# Patient Record
Sex: FEMALE | Race: WHITE | NOT HISPANIC OR LATINO | Employment: FULL TIME | URBAN - METROPOLITAN AREA
[De-identification: names, ages, dates, MRNs, and addresses within clinical notes are randomized per-mention and may not be internally consistent; named-entity substitution may affect disease eponyms.]

---

## 2023-09-27 PROBLEM — G44.86 CERVICOGENIC HEADACHE: Status: ACTIVE | Noted: 2023-09-27

## 2023-09-27 PROBLEM — E04.2 MULTIPLE THYROID NODULES: Status: ACTIVE | Noted: 2023-09-27

## 2023-09-27 PROBLEM — N93.0 POSTCOITAL BLEEDING: Status: ACTIVE | Noted: 2023-09-27

## 2023-09-27 PROBLEM — M54.12 CERVICAL RADICULOPATHY: Status: ACTIVE | Noted: 2023-09-27

## 2023-09-27 PROBLEM — M25.519 SHOULDER PAIN: Status: ACTIVE | Noted: 2023-09-27

## 2023-09-27 RX ORDER — PROPRANOLOL HYDROCHLORIDE 20 MG/1
20 TABLET ORAL DAILY
COMMUNITY
Start: 2021-06-28

## 2023-09-27 RX ORDER — SPIRONOLACTONE 50 MG/1
50 TABLET, FILM COATED ORAL 2 TIMES DAILY
COMMUNITY

## 2023-09-27 RX ORDER — FLUTICASONE PROPIONATE 50 MCG
2 SPRAY, SUSPENSION (ML) NASAL DAILY PRN
COMMUNITY
Start: 2016-12-19

## 2023-09-27 RX ORDER — MELOXICAM 15 MG/1
15 TABLET ORAL DAILY
COMMUNITY
End: 2024-01-08 | Stop reason: ALTCHOICE

## 2023-09-27 RX ORDER — LISDEXAMFETAMINE DIMESYLATE 50 MG/1
CAPSULE ORAL
COMMUNITY
Start: 2022-01-26 | End: 2024-01-08 | Stop reason: DRUGHIGH

## 2023-09-27 RX ORDER — DEXTROAMPHETAMINE SACCHARATE, AMPHETAMINE ASPARTATE, DEXTROAMPHETAMINE SULFATE AND AMPHETAMINE SULFATE 1.25; 1.25; 1.25; 1.25 MG/1; MG/1; MG/1; MG/1
TABLET ORAL
COMMUNITY
Start: 2016-12-01 | End: 2024-01-08 | Stop reason: ALTCHOICE

## 2023-09-27 RX ORDER — BUSPIRONE HYDROCHLORIDE 10 MG/1
10 TABLET ORAL 3 TIMES DAILY
COMMUNITY
Start: 2021-06-07 | End: 2024-06-03

## 2023-10-02 ENCOUNTER — OFFICE VISIT (OUTPATIENT)
Dept: OBSTETRICS AND GYNECOLOGY | Facility: CLINIC | Age: 38
End: 2023-10-02
Payer: COMMERCIAL

## 2023-10-02 VITALS
WEIGHT: 145 LBS | BODY MASS INDEX: 25.69 KG/M2 | DIASTOLIC BLOOD PRESSURE: 80 MMHG | SYSTOLIC BLOOD PRESSURE: 120 MMHG | HEIGHT: 63 IN

## 2023-10-02 DIAGNOSIS — Z01.419 ENCOUNTER FOR ANNUAL ROUTINE GYNECOLOGICAL EXAMINATION: ICD-10-CM

## 2023-10-02 PROCEDURE — 99202 OFFICE O/P NEW SF 15 MIN: CPT | Performed by: OBSTETRICS & GYNECOLOGY

## 2023-10-02 ASSESSMENT — ENCOUNTER SYMPTOMS
EYES NEGATIVE: 0
ALLERGIC/IMMUNOLOGIC NEGATIVE: 0
GASTROINTESTINAL NEGATIVE: 0
NEUROLOGICAL NEGATIVE: 0
RESPIRATORY NEGATIVE: 0
CARDIOVASCULAR NEGATIVE: 0
HEMATOLOGIC/LYMPHATIC NEGATIVE: 0
MUSCULOSKELETAL NEGATIVE: 0
PSYCHIATRIC NEGATIVE: 0
ENDOCRINE NEGATIVE: 0
CONSTITUTIONAL NEGATIVE: 0

## 2023-10-02 ASSESSMENT — PAIN SCALES - GENERAL: PAINLEVEL: 6

## 2023-10-02 NOTE — PROGRESS NOTES
Subjective   Patient ID: Jen Franks is a 38 y.o. female who presents for Annual Exam and Abdominal Pain.  HPI    Review of Systems    Objective   Physical Exam    Assessment/Plan   {Assess/PlanSmartLinks:51617}

## 2023-10-02 NOTE — PROGRESS NOTES
Pt here for  severe Menstrual pain during her cycles. And bleeding during intercourse.    Pt  had a normal ultrasound recently and is interested in an IUD     Chaperone declined Yvette Ge

## 2023-10-16 ENCOUNTER — OFFICE VISIT (OUTPATIENT)
Dept: ORTHOPEDIC SURGERY | Facility: HOSPITAL | Age: 38
End: 2023-10-16
Payer: COMMERCIAL

## 2023-10-16 DIAGNOSIS — M25.511 CHRONIC RIGHT SHOULDER PAIN: Primary | ICD-10-CM

## 2023-10-16 DIAGNOSIS — M25.511 RIGHT SHOULDER PAIN, UNSPECIFIED CHRONICITY: ICD-10-CM

## 2023-10-16 DIAGNOSIS — G89.29 CHRONIC RIGHT SHOULDER PAIN: Primary | ICD-10-CM

## 2023-10-16 PROCEDURE — 99212 OFFICE O/P EST SF 10 MIN: CPT | Performed by: ORTHOPAEDIC SURGERY

## 2023-10-16 NOTE — PROGRESS NOTES
Is here for follow-up on her right shoulder she continues to have pain despite injection and extensive physical therapy.    Right shoulder range of motion elevation 170 external rotation 70 internal rotation T10.  Motor power abduction internal rotation 5/5 there is pain at the extremes of range of motion particularly internal rotation and abduction.    Right shoulder pain    Patient has recalcitrant right shoulder pain I have recommended further evaluation by MRI.  Follow-up after MRI.    This was dictated using voice recognition software and not corrected for grammatical or spelling errors.

## 2023-10-25 ENCOUNTER — APPOINTMENT (OUTPATIENT)
Dept: RADIOLOGY | Facility: CLINIC | Age: 38
End: 2023-10-25
Payer: COMMERCIAL

## 2023-10-27 ENCOUNTER — APPOINTMENT (OUTPATIENT)
Dept: ORTHOPEDIC SURGERY | Facility: HOSPITAL | Age: 38
End: 2023-10-27
Payer: COMMERCIAL

## 2023-10-30 ENCOUNTER — OFFICE VISIT (OUTPATIENT)
Dept: ORTHOPEDIC SURGERY | Facility: HOSPITAL | Age: 38
End: 2023-10-30
Payer: COMMERCIAL

## 2023-10-30 DIAGNOSIS — M25.511 RIGHT SHOULDER PAIN, UNSPECIFIED CHRONICITY: ICD-10-CM

## 2023-10-30 PROCEDURE — 99213 OFFICE O/P EST LOW 20 MIN: CPT | Performed by: ORTHOPAEDIC SURGERY

## 2023-10-30 PROCEDURE — 2500000005 HC RX 250 GENERAL PHARMACY W/O HCPCS: Performed by: ORTHOPAEDIC SURGERY

## 2023-10-30 PROCEDURE — 2500000004 HC RX 250 GENERAL PHARMACY W/ HCPCS (ALT 636 FOR OP/ED): Performed by: ORTHOPAEDIC SURGERY

## 2023-10-30 PROCEDURE — 20610 DRAIN/INJ JOINT/BURSA W/O US: CPT | Performed by: ORTHOPAEDIC SURGERY

## 2023-10-30 RX ORDER — TRIAMCINOLONE ACETONIDE 40 MG/ML
40 INJECTION, SUSPENSION INTRA-ARTICULAR; INTRAMUSCULAR
Status: COMPLETED | OUTPATIENT
Start: 2023-10-30 | End: 2023-10-30

## 2023-10-30 RX ORDER — LIDOCAINE HYDROCHLORIDE 10 MG/ML
4 INJECTION INFILTRATION; PERINEURAL
Status: COMPLETED | OUTPATIENT
Start: 2023-10-30 | End: 2023-10-30

## 2023-10-30 RX ADMIN — LIDOCAINE HYDROCHLORIDE 4 ML: 10 INJECTION, SOLUTION INFILTRATION; PERINEURAL at 15:09

## 2023-10-30 RX ADMIN — TRIAMCINOLONE ACETONIDE 40 MG: 200 INJECTION, SUSPENSION INTRA-ARTICULAR; INTRAMUSCULAR at 15:09

## 2023-10-30 NOTE — PROGRESS NOTES
Patient is here for reevaluation of her right shoulder and review of her MRI scan.  She continues to have pain in the shoulder primarily posteriorly.    Physical examination right shoulder reveals full range of motion and slight pain on passive internal rotation and cross body motion.      MRI scan reviewed in detail with the patient, it is a poor quality MRI.  Cannot distinguish the superior rotator cuff tendon or the labrum clearly enough to make a diagnosis.  The anterior and posterior rotator cuff.  Intact biceps tendon is in the bicipital groove.    Right shoulder pain    Patient continues to have right shoulder pain differential diagnosis includes posterior labrum tear and rotator cuff tear.  MRI scan is nondiagnostic.  Recommended repeat injection intra-articular and physical therapy consultation and repeat examination in 3 to 4 weeks.  If still symptomatic recommend a repeat MRI scan with arthrogram.    This was dictated using voice recognition software and not corrected for grammatical or spelling errors.        phyL Inj/Asp: R glenohumeral on 10/30/2023 3:09 PM  Indications: pain  Details: 22 G needle, posterior approach  Medications: 40 mg triamcinolone acetonide 40 mg/mL; 4 mL lidocaine 10 mg/mL (1 %)  Procedure, treatment alternatives, risks and benefits explained, specific risks discussed. Consent was given by the patient.

## 2023-11-01 ENCOUNTER — APPOINTMENT (OUTPATIENT)
Dept: ORTHOPEDIC SURGERY | Facility: HOSPITAL | Age: 38
End: 2023-11-01
Payer: COMMERCIAL

## 2023-11-14 ENCOUNTER — PROCEDURE VISIT (OUTPATIENT)
Dept: OBSTETRICS AND GYNECOLOGY | Facility: CLINIC | Age: 38
End: 2023-11-14
Payer: COMMERCIAL

## 2023-11-14 VITALS
SYSTOLIC BLOOD PRESSURE: 104 MMHG | HEIGHT: 62 IN | WEIGHT: 135 LBS | DIASTOLIC BLOOD PRESSURE: 70 MMHG | BODY MASS INDEX: 24.84 KG/M2

## 2023-11-14 DIAGNOSIS — Z30.430 ENCOUNTER FOR IUD INSERTION: ICD-10-CM

## 2023-11-14 PROCEDURE — 58300 INSERT INTRAUTERINE DEVICE: CPT | Performed by: OBSTETRICS & GYNECOLOGY

## 2023-11-14 PROCEDURE — 99214 OFFICE O/P EST MOD 30 MIN: CPT | Performed by: OBSTETRICS & GYNECOLOGY

## 2023-11-14 ASSESSMENT — PAIN SCALES - GENERAL: PAINLEVEL: 0-NO PAIN

## 2023-11-14 NOTE — PROGRESS NOTES
I saw and evaluated the patient. I personally obtained the key and critical portions of the history and physical exam or was physically present for key and critical portions performed by the resident/fellow. I reviewed the resident/fellow's documentation and discussed the patient with the resident/fellow. I agree with the resident/fellow's medical decision making as documented in the note.    Bridgette Cesar MD

## 2023-11-14 NOTE — PROGRESS NOTES
"Subjective   Patient ID: Jen Franks is a 38 y.o. female who presents for IUD INSERTION.    HPI     First time IUD   Heavy menstrual bleeding  Previously OCP and nexplanon and continued to have heavy bleeding      Review of Systems   Genitourinary:  Positive for menstrual problem and vaginal bleeding.       Objective   /70 (BP Location: Right arm, Patient Position: Sitting, BP Cuff Size: Adult)   Ht 1.575 m (5' 2\")   Wt 61.2 kg (135 lb)   LMP 11/01/2023 (Exact Date)   BMI 24.69 kg/m²     Physical Exam  Constitutional:       Appearance: She is obese.   Pulmonary:      Effort: Pulmonary effort is normal.   Genitourinary:     General: Normal vulva.      Vagina: Normal.      Cervix: Normal.      Uterus: Normal.       Adnexa: Right adnexa normal and left adnexa normal.      Rectum: Normal.   Musculoskeletal:      Cervical back: Neck supple.   Skin:     General: Skin is warm.   Psychiatric:         Attention and Perception: Attention normal.         Mood and Affect: Mood normal.         Speech: Speech normal.         Behavior: Behavior normal.         Thought Content: Thought content normal.           IUD Insertion    Date/Time: 11/14/2023 3:15 PM    Performed by: Bridgette Cesar MD  Authorized by: Bridgette Cesar MD    Consent:     Consent obtained:  Verbal and written    Consent given by:  Patient    Procedure risks and benefits discussed: yes      Patient questions answered: yes      Patient agrees, verbalizes understanding, and wants to proceed: yes      Educational handouts given: yes    Procedure:     Pelvic exam performed: yes      Cervix cleaned and prepped: yes      Speculum placed in vagina: yes      Tenaculum applied to cervix: yes      Uterus sounded: yes      Uterus sound depth (cm):  8    IUD inserted with no complications: yes      IUD type:  Mirena    Strings trimmed: yes    Post-procedure:     Patient tolerated procedure well: yes      Patient will follow up after next period: yes    Comments:    "   Lidocaine injection given in cervix     Assessment/Plan   Diagnoses and all orders for this visit:  Encounter for IUD insertion  -     IUD Insertion  Ibuprofen 600mg given post procedure  Patient tolerated well    Follow up in 6-8 weeks for check up    I have personally reviewed all available pertinent labs, imaging, and consult notes with the patient.     All questions and concerns were addressed. Patient verbalizes understanding instructions and agrees with established plan of care.     Patient seen and discussed with Dr. Arsenio Camp MD

## 2023-11-15 ENCOUNTER — OFFICE VISIT (OUTPATIENT)
Dept: ORTHOPEDIC SURGERY | Facility: HOSPITAL | Age: 38
End: 2023-11-15
Payer: COMMERCIAL

## 2023-11-15 DIAGNOSIS — G89.29 CHRONIC RIGHT SHOULDER PAIN: ICD-10-CM

## 2023-11-15 DIAGNOSIS — M25.511 CHRONIC RIGHT SHOULDER PAIN: ICD-10-CM

## 2023-11-15 DIAGNOSIS — M25.511 RIGHT SHOULDER PAIN, UNSPECIFIED CHRONICITY: ICD-10-CM

## 2023-11-15 PROCEDURE — 99212 OFFICE O/P EST SF 10 MIN: CPT | Performed by: ORTHOPAEDIC SURGERY

## 2023-11-15 PROCEDURE — 1036F TOBACCO NON-USER: CPT | Performed by: ORTHOPAEDIC SURGERY

## 2023-11-15 NOTE — PROGRESS NOTES
Symptoms of pain primarily at extremes of range of motion when laying on her right side.  She did not get full relief with injection or oral steroids she has had therapy and has not like a progress    Right shoulder elevation 170 external rotation 60 internal rotation T12.  Motor power abduction 4 external rotation 4 internal rotation 5.  Positive Oliver's test pain at the extremes of range of motion particularly elevation.    Right shoulder pain    The patient has right shoulder pain refractory to treatment I have recommended further evaluation by MRI arthrogram she had a previous MRI scan that was nondiagnostic.    We will attempt to obtain approval for an MRI arthrogram and she will follow-up after it has been done.    This was dictated using voice recognition software and not corrected for grammatical or spelling errors.

## 2023-11-29 ENCOUNTER — APPOINTMENT (OUTPATIENT)
Dept: ORTHOPEDIC SURGERY | Facility: HOSPITAL | Age: 38
End: 2023-11-29
Payer: COMMERCIAL

## 2023-12-14 ENCOUNTER — HOSPITAL ENCOUNTER (OUTPATIENT)
Dept: RADIOLOGY | Facility: HOSPITAL | Age: 38
Discharge: HOME | End: 2023-12-14
Payer: COMMERCIAL

## 2023-12-14 VITALS
RESPIRATION RATE: 18 BRPM | HEART RATE: 102 BPM | SYSTOLIC BLOOD PRESSURE: 104 MMHG | OXYGEN SATURATION: 100 % | DIASTOLIC BLOOD PRESSURE: 73 MMHG

## 2023-12-14 DIAGNOSIS — G89.29 CHRONIC RIGHT SHOULDER PAIN: ICD-10-CM

## 2023-12-14 DIAGNOSIS — M25.511 RIGHT SHOULDER PAIN, UNSPECIFIED CHRONICITY: ICD-10-CM

## 2023-12-14 DIAGNOSIS — M25.511 CHRONIC RIGHT SHOULDER PAIN: ICD-10-CM

## 2023-12-14 PROCEDURE — 73222 MRI JOINT UPR EXTREM W/DYE: CPT | Mod: RIGHT SIDE | Performed by: STUDENT IN AN ORGANIZED HEALTH CARE EDUCATION/TRAINING PROGRAM

## 2023-12-14 PROCEDURE — 23350 INJECTION FOR SHOULDER X-RAY: CPT | Mod: RT

## 2023-12-14 PROCEDURE — 96373 THER/PROPH/DIAG INJ IA: CPT | Performed by: ORTHOPAEDIC SURGERY

## 2023-12-14 PROCEDURE — 23350 INJECTION FOR SHOULDER X-RAY: CPT | Mod: RIGHT SIDE | Performed by: STUDENT IN AN ORGANIZED HEALTH CARE EDUCATION/TRAINING PROGRAM

## 2023-12-14 PROCEDURE — 73222 MRI JOINT UPR EXTREM W/DYE: CPT | Mod: RT

## 2023-12-14 PROCEDURE — 77002 NEEDLE LOCALIZATION BY XRAY: CPT | Mod: RIGHT SIDE | Performed by: STUDENT IN AN ORGANIZED HEALTH CARE EDUCATION/TRAINING PROGRAM

## 2023-12-14 PROCEDURE — 2550000001 HC RX 255 CONTRASTS: Performed by: ORTHOPAEDIC SURGERY

## 2023-12-14 PROCEDURE — A9575 INJ GADOTERATE MEGLUMI 0.1ML: HCPCS | Performed by: ORTHOPAEDIC SURGERY

## 2023-12-14 RX ORDER — LIDOCAINE HYDROCHLORIDE 20 MG/ML
4 INJECTION, SOLUTION INFILTRATION; PERINEURAL ONCE
Status: DISCONTINUED | OUTPATIENT
Start: 2023-12-14 | End: 2023-12-15 | Stop reason: HOSPADM

## 2023-12-14 RX ORDER — GADOTERATE MEGLUMINE 376.9 MG/ML
0.15 INJECTION INTRAVENOUS
Status: COMPLETED | OUTPATIENT
Start: 2023-12-14 | End: 2023-12-14

## 2023-12-14 RX ADMIN — IOHEXOL 10 ML: 180 INJECTION INTRAVENOUS at 11:49

## 2023-12-14 RX ADMIN — GADOTERATE MEGLUMINE 0.15 ML: 376.9 INJECTION INTRAVENOUS at 11:49

## 2023-12-14 ASSESSMENT — PAIN SCALES - GENERAL: PAINLEVEL_OUTOF10: 0 - NO PAIN

## 2023-12-18 ENCOUNTER — OFFICE VISIT (OUTPATIENT)
Dept: ORTHOPEDIC SURGERY | Facility: HOSPITAL | Age: 38
End: 2023-12-18
Payer: COMMERCIAL

## 2023-12-18 DIAGNOSIS — S43.431D SUPERIOR GLENOID LABRUM LESION OF RIGHT SHOULDER, SUBSEQUENT ENCOUNTER: Primary | ICD-10-CM

## 2023-12-18 DIAGNOSIS — S43.431D SUPERIOR GLENOID LABRUM LESION OF RIGHT SHOULDER, SUBSEQUENT ENCOUNTER: ICD-10-CM

## 2023-12-18 PROBLEM — S43.431A SUPERIOR GLENOID LABRUM LESION OF RIGHT SHOULDER: Status: ACTIVE | Noted: 2023-12-18

## 2023-12-18 PROCEDURE — L3670 SO ACRO/CLAV CAN WEB PRE OTS: HCPCS | Performed by: ORTHOPAEDIC SURGERY

## 2023-12-18 PROCEDURE — 99213 OFFICE O/P EST LOW 20 MIN: CPT | Performed by: ORTHOPAEDIC SURGERY

## 2023-12-18 PROCEDURE — 1036F TOBACCO NON-USER: CPT | Performed by: ORTHOPAEDIC SURGERY

## 2023-12-18 NOTE — PROGRESS NOTES
Patient continues to have symptoms in her right shoulder she is here to review the MRI arthrogram    MRI arthrogram of the right shoulder reviewed in detail with the patient shows a SLAP lesion superior aspect of the labrum near the bicipital attachment.  The glenohumeral articular cartilage and rotator cuff are intact.     SLAP lesion right shoulder    We discussed options for treatment I recommend the patient consider surgery explained the surgery in detail and the rehabilitation sequence in detail patient is interested in pursuing the procedure procedure be right shoulder arthroscopy with repair of SLAP lesion.  Will schedule for sometime in the near future.    This was dictated using voice recognition software and not corrected for grammatical or spelling errors.

## 2023-12-22 ENCOUNTER — TELEPHONE (OUTPATIENT)
Dept: OBSTETRICS AND GYNECOLOGY | Facility: CLINIC | Age: 38
End: 2023-12-22
Payer: COMMERCIAL

## 2023-12-22 NOTE — TELEPHONE ENCOUNTER
Pt had IUD inserted in November and is having extremely painful cramps (with intermittent spotting) that started shortly after insertion that comes and goes wants to be seen. Pt has appt on Jan 9th 24.

## 2023-12-23 DIAGNOSIS — R10.2 PELVIC PAIN: Primary | ICD-10-CM

## 2023-12-26 NOTE — TELEPHONE ENCOUNTER
Spoke with pt and verified by name and   Pt already has U/S radha. Will follow up  after test is done

## 2023-12-28 ENCOUNTER — ANCILLARY PROCEDURE (OUTPATIENT)
Dept: RADIOLOGY | Facility: CLINIC | Age: 38
End: 2023-12-28
Payer: COMMERCIAL

## 2023-12-28 DIAGNOSIS — R10.2 PELVIC PAIN: ICD-10-CM

## 2023-12-28 PROCEDURE — 76856 US EXAM PELVIC COMPLETE: CPT | Performed by: RADIOLOGY

## 2023-12-28 PROCEDURE — 76830 TRANSVAGINAL US NON-OB: CPT | Performed by: RADIOLOGY

## 2023-12-28 PROCEDURE — 76856 US EXAM PELVIC COMPLETE: CPT

## 2024-01-04 ENCOUNTER — APPOINTMENT (OUTPATIENT)
Dept: RADIOLOGY | Facility: HOSPITAL | Age: 39
End: 2024-01-04
Payer: COMMERCIAL

## 2024-01-08 ENCOUNTER — PRE-ADMISSION TESTING (OUTPATIENT)
Dept: PREADMISSION TESTING | Facility: HOSPITAL | Age: 39
End: 2024-01-08
Payer: COMMERCIAL

## 2024-01-08 ENCOUNTER — LAB (OUTPATIENT)
Dept: LAB | Facility: LAB | Age: 39
End: 2024-01-08
Payer: COMMERCIAL

## 2024-01-08 VITALS
WEIGHT: 149.47 LBS | HEART RATE: 103 BPM | DIASTOLIC BLOOD PRESSURE: 82 MMHG | HEIGHT: 62 IN | TEMPERATURE: 96.8 F | BODY MASS INDEX: 27.51 KG/M2 | SYSTOLIC BLOOD PRESSURE: 117 MMHG | OXYGEN SATURATION: 100 %

## 2024-01-08 DIAGNOSIS — Z01.818 PREOPERATIVE TESTING: ICD-10-CM

## 2024-01-08 DIAGNOSIS — Z01.818 PREOPERATIVE TESTING: Primary | ICD-10-CM

## 2024-01-08 LAB
ANION GAP SERPL CALC-SCNC: 9 MMOL/L
BUN SERPL-MCNC: 14 MG/DL (ref 8–25)
CALCIUM SERPL-MCNC: 9.2 MG/DL (ref 8.5–10.4)
CHLORIDE SERPL-SCNC: 102 MMOL/L (ref 97–107)
CO2 SERPL-SCNC: 27 MMOL/L (ref 24–31)
CREAT SERPL-MCNC: 0.8 MG/DL (ref 0.4–1.6)
EGFRCR SERPLBLD CKD-EPI 2021: >90 ML/MIN/1.73M*2
ERYTHROCYTE [DISTWIDTH] IN BLOOD BY AUTOMATED COUNT: 11.5 % (ref 11.5–14.5)
GLUCOSE SERPL-MCNC: 95 MG/DL (ref 65–99)
HCT VFR BLD AUTO: 41.8 % (ref 36–46)
HGB BLD-MCNC: 14.4 G/DL (ref 12–16)
MCH RBC QN AUTO: 32.4 PG (ref 26–34)
MCHC RBC AUTO-ENTMCNC: 34.4 G/DL (ref 32–36)
MCV RBC AUTO: 94 FL (ref 80–100)
NRBC BLD-RTO: 0 /100 WBCS (ref 0–0)
PLATELET # BLD AUTO: 277 X10*3/UL (ref 150–450)
POTASSIUM SERPL-SCNC: 4.8 MMOL/L (ref 3.4–5.1)
RBC # BLD AUTO: 4.45 X10*6/UL (ref 4–5.2)
SODIUM SERPL-SCNC: 138 MMOL/L (ref 133–145)
WBC # BLD AUTO: 8.5 X10*3/UL (ref 4.4–11.3)

## 2024-01-08 PROCEDURE — 85027 COMPLETE CBC AUTOMATED: CPT

## 2024-01-08 PROCEDURE — 80048 BASIC METABOLIC PNL TOTAL CA: CPT

## 2024-01-08 PROCEDURE — 94760 N-INVAS EAR/PLS OXIMETRY 1: CPT

## 2024-01-08 PROCEDURE — 36415 COLL VENOUS BLD VENIPUNCTURE: CPT

## 2024-01-08 PROCEDURE — 99203 OFFICE O/P NEW LOW 30 MIN: CPT | Performed by: NURSE PRACTITIONER

## 2024-01-08 RX ORDER — CALCIUM CARBONATE 300MG(750)
400 TABLET,CHEWABLE ORAL DAILY
COMMUNITY

## 2024-01-08 RX ORDER — DIPHENHYDRAMINE HCL 25 MG
25 TABLET ORAL 2 TIMES DAILY PRN
COMMUNITY
End: 2024-06-03

## 2024-01-08 RX ORDER — LISDEXAMFETAMINE DIMESYLATE 70 MG/1
70 CAPSULE ORAL EVERY MORNING
COMMUNITY
End: 2024-06-03

## 2024-01-08 RX ORDER — LANOLIN ALCOHOL/MO/W.PET/CERES
1000 CREAM (GRAM) TOPICAL DAILY
COMMUNITY
End: 2024-06-03

## 2024-01-08 RX ORDER — CETIRIZINE HYDROCHLORIDE 10 MG/1
10 TABLET ORAL DAILY
COMMUNITY

## 2024-01-08 ASSESSMENT — DUKE ACTIVITY SCORE INDEX (DASI)
CAN YOU WALK INDOORS, SUCH AS AROUND YOUR HOUSE: YES
CAN YOU PARTICIPATE IN STRENOUS SPORTS LIKE SWIMMING, SINGLES TENNIS, FOOTBALL, BASKETBALL, OR SKIING: YES
CAN YOU PARTICIPATE IN MODERATE RECREATIONAL ACTIVITIES LIKE GOLF, BOWLING, DANCING, DOUBLES TENNIS OR THROWING A BASEBALL OR FOOTBALL: YES
CAN YOU DO YARD WORK LIKE RAKING LEAVES, WEEDING OR PUSHING A MOWER: YES
CAN YOU TAKE CARE OF YOURSELF (EAT, DRESS, BATHE, OR USE TOILET): YES
DASI METS SCORE: 9.9
TOTAL_SCORE: 58.2
CAN YOU DO MODERATE WORK AROUND THE HOUSE LIKE VACUUMING, SWEEPING FLOORS OR CARRYING GROCERIES: YES
CAN YOU DO LIGHT WORK AROUND THE HOUSE LIKE DUSTING OR WASHING DISHES: YES
CAN YOU HAVE SEXUAL RELATIONS: YES
CAN YOU CLIMB A FLIGHT OF STAIRS OR WALK UP A HILL: YES
CAN YOU RUN A SHORT DISTANCE: YES
CAN YOU DO HEAVY WORK AROUND THE HOUSE LIKE SCRUBBING FLOORS OR LIFTING AND MOVING HEAVY FURNITURE: YES
CAN YOU WALK A BLOCK OR TWO ON LEVEL GROUND: YES

## 2024-01-08 ASSESSMENT — CHADS2 SCORE
CHF: NO
PRIOR STROKE OR TIA OR THROMBOEMBOLISM: NO
AGE GREATER THAN OR EQUAL TO 75: NO
HYPERTENSION: NO
DIABETES: NO
CHADS2 SCORE: 0

## 2024-01-08 ASSESSMENT — ENCOUNTER SYMPTOMS
GASTROINTESTINAL NEGATIVE: 1
PSYCHIATRIC NEGATIVE: 1
NEUROLOGICAL NEGATIVE: 1
CARDIOVASCULAR NEGATIVE: 1
ARTHRALGIAS: 1
ACTIVITY CHANGE: 1
EYES NEGATIVE: 1
RESPIRATORY NEGATIVE: 1

## 2024-01-08 ASSESSMENT — PAIN SCALES - GENERAL: PAINLEVEL_OUTOF10: 4

## 2024-01-08 ASSESSMENT — PAIN DESCRIPTION - DESCRIPTORS: DESCRIPTORS: ACHING

## 2024-01-08 ASSESSMENT — PAIN - FUNCTIONAL ASSESSMENT: PAIN_FUNCTIONAL_ASSESSMENT: 0-10

## 2024-01-08 NOTE — PREPROCEDURE INSTRUCTIONS
Medication List            Accurate as of January 8, 2024  7:50 AM. Always use your most recent med list.                busPIRone 10 mg tablet  Commonly known as: Buspar  Medication Adjustments for Surgery: Take morning of surgery with sip of water, no other fluids     CALTRATE WITH VITAMIN D3 ORAL  Medication Adjustments for Surgery: Stop 7 days before surgery     cetirizine 10 mg tablet  Commonly known as: ZyrTEC  Notes to patient: DO NOT TAKE MORNING OF SURGERY     cyanocobalamin 1,000 mcg tablet  Commonly known as: Vitamin B-12  Medication Adjustments for Surgery: Stop 7 days before surgery     diphenhydrAMINE 25 mg tablet  Commonly known as: Sominex  Notes to patient: DO NOT TAKE MORNING OF SURGERY     fluticasone 50 mcg/actuation nasal spray  Commonly known as: Flonase     GLUCOSAMINE CHONDROITIN ORAL  Medication Adjustments for Surgery: Stop 7 days before surgery     lisdexamfetamine 70 mg capsule  Commonly known as: Vyvanse  Notes to patient: DO NOT TAKE MORNING OF SURGERY     magnesium oxide 400 mg tablet  Commonly known as: Mag-Ox  Medication Adjustments for Surgery: Stop 7 days before surgery     propranolol 20 mg tablet  Commonly known as: Inderal  Medication Adjustments for Surgery: Take morning of surgery with sip of water, no other fluids     spironolactone 50 mg tablet  Commonly known as: Aldactone  Medication Adjustments for Surgery: Take morning of surgery with sip of water, no other fluids     VALERIAN ROOT ORAL  Medication Adjustments for Surgery: Stop 7 days before surgery     Vraylar 1.5 mg capsule  Generic drug: cariprazine  Medication Adjustments for Surgery: Take morning of surgery with sip of water, no other fluids                              NPO Instructions:    Do not eat any food after midnight the night before your surgery/procedure.    Additional Instructions:     Day of Surgery:  Review your medication instructions, take indicated medications  Wear  comfortable loose fitting  clothing  All jewelry and valuables should be left at home    PAT DISCHARGE INSTRUCTIONS    Please call the Same Day Surgery (SDS) Department of the hospital where your procedure will be performed after 2:00 PM the day before your surgery. If you are scheduled on a Monday, or a Tuesday following a Monday holiday, you will need to call on the last business day prior to your surgery.    [facility]  [address] [place] [address], [address] [phone]    [address] [address] [phone]    [facility]  [address]  [place] [id number] [phone]    Please let your surgeon know if:      You develop any open sores, shingles, burning or painful urination as these may increase your risk of an infection.   You no longer wish to have the surgery.   Any other personal circumstances change that may lead to the need to cancel or defer this surgery-such as being sick or getting admitted to any hospital within one week of your planned procedure.    Your contact details change, such as a change of address or phone number.    Starting now:     Please DO NOT drink alcohol or smoke for 24 hours before surgery. It is well known that quitting smoking can make a huge difference to your health and recovery from surgery. The longer you abstain from smoking, the better your chances of a healthy recovery. If you need help with quitting, call 1-800-QUIT-NOW to be connected to a trained counselor who will discuss the best methods to help you quit.     Before your surgery:    Please stop all supplements 7 days prior to surgery. Or as directed by your surgeon.   Please stop taking NSAID pain medicine such as Advil and Motrin 7 days before surgery.    If you develop any fever, cough, cold, rashes, cuts, scratches, scrapes, urinary symptoms or infection anywhere on your body (including teeth and gums)  prior to surgery, please call your surgeon’s office as soon as possible. This may require treatment to reduce the chance of cancellation on the day of surgery.    The day before your surgery:   DIET- Do not eat any food after MIDNIGHT. May have 10 ounces of CLEAR LIQUIDS until TWO HOURS before your arrival time. This includes water, black tea or coffee (no milk ir cream), apple juice and electrolyte drinks (Gatorade). May chew gum until TWO hours before your surgery time.   Get a good night’s rest.  Use the special soap for bathing if you have been instructed to use one.    Scheduled surgery times may change and you will be notified if this occurs - please check your personal voicemail for any updates.     On the morning of surgery:   Wear comfortable, loose fitting clothes which open in the front. Please do not wear moisturizers, creams, lotions, makeup or perfume.    Please bring with you to surgery:   Photo ID and insurance card   Current list of medicines and allergies   Pacemaker/ Defibrillator/Heart stent cards   CPAP machine and mask    Slings/ splints/ crutches   A copy of your complete advanced directive/DHPOA.    Please do NOT bring with you to surgery:   All jewelry and valuables should be left at home.   Prosthetic devices such as contact lenses, hearing aids, dentures, eyelash extensions, hairpins and body piercings must be removed prior to going in to the surgical suite.    After outpatient surgery:   A responsible adult MUST accompany you at the time of discharge and stay with you for 24 hours after your surgery. You may NOT drive yourself home after surgery.    Do not drive, operate machinery, make critical decisions or do activities that require co-ordination or balance until after a night’s sleep.   Do not drink alcoholic beverages for 24 hours.   Instructions for resuming your medications will be provided by your surgeon.    CALL YOUR DOCTOR AFTER SURGERY IF YOU HAVE:     Chills and/or a fever of  101° F or higher.    Redness, swelling, pus or drainage from your surgical wound or a bad smell from the wound.    Lightheadedness, fainting or confusion.    Persistent vomiting (throwing up) and are not able to eat or drink for 12 hours.    Three or more loose, watery bowel movements in 24 hours (diarrhea).   Difficulty or pain while urinating( after non-urological surgery)    Pain and swelling in your legs, especially if it is only on one side.    Difficulty breathing or are breathing faster than normal.    Any new concerning symptoms.

## 2024-01-08 NOTE — CPM/PAT H&P
CPM/PAT Evaluation       Name: Jen Franks (Jen Franks)  /Age: 1985/38 y.o.     In-Person       Chief Complaint: Superior glenoid labrum lesion of right shoulder    HPI  A 38-year-old female with superior glenoid labrum lesion of right shoulder.  History of progressive right shoulder pain over the past 8 months.  Symptoms increase with reaching, lifting, or pulling motions interfering with sleep and exercise.  Conservative treatments/injections not helping.  Denies fever, chills, or right upper extremity numbness/tingling. She is scheduled for right shoulder SLAP repair.    Past Medical History:   Diagnosis Date    ADHD (attention deficit hyperactivity disorder)     Adverse effect of anesthesia     Postop agitation    Anxiety     Other conditions influencing health status 2017    History of frequent headaches    Other specified disorders of temporomandibular joint 2017    Temporomandibular jaw dysfunction       Past Surgical History:   Procedure Laterality Date    KNEE Bilateral     Arthroscopy    OTHER SURGICAL HISTORY  2022    Knee surgery         Allergies   Allergen Reactions    Eggs-Apples-Oats [Nutritional Supplement-Fiber] GI Upset    Meperidine Other     PANIC ATTACK       Current Outpatient Medications   Medication Sig Dispense Refill    busPIRone (Buspar) 10 mg tablet Take 1 tablet (10 mg) by mouth 3 times a day.      calcium carbonate/vitamin D3 (CALTRATE WITH VITAMIN D3 ORAL) Take 1 tablet by mouth once daily. CALTRATE BONE HEALTH 600 PLUS 20 MCG VIT D 3      cariprazine (Vraylar) 1.5 mg capsule Take 1 capsule (1.5 mg) by mouth once daily. MORNING      cetirizine (ZyrTEC) 10 mg tablet Take 1 tablet (10 mg) by mouth once daily. MORNING      cyanocobalamin (Vitamin B-12) 1,000 mcg tablet Take 1 tablet (1,000 mcg) by mouth once daily.      diphenhydrAMINE (Sominex) 25 mg tablet Take 1 tablet (25 mg) by mouth 2 times a day as needed for sleep, allergies or itching. 25 MG - 50 MG       fluticasone (Flonase) 50 mcg/actuation nasal spray 2 sprays once daily as needed for rhinitis or allergies.      glucos sul 2KCl/msm/chond/C/Mn (GLUCOSAMINE CHONDROITIN ORAL) Take 1 tablet by mouth once daily. 4050  MG      lisdexamfetamine (Vyvanse) 70 mg capsule Take 1 capsule (70 mg) by mouth once daily in the morning.      magnesium oxide (Mag-Ox) 400 mg tablet Take 1 tablet (400 mg) by mouth once daily.      propranolol (Inderal) 20 mg tablet Take 1 tablet (20 mg) by mouth once daily. EVERY MORNING FOR TREATMENT OF ANXIETY      spironolactone (Aldactone) 50 mg tablet Take 1 tablet (50 mg) by mouth 2 times a day. ACNE      VALERIAN ROOT ORAL Take 2,400 mg by mouth once daily.       No current facility-administered medications for this visit.       Review of Systems   Constitutional:  Positive for activity change.   HENT: Negative.     Eyes: Negative.         Glasses   Respiratory: Negative.     Cardiovascular: Negative.    Gastrointestinal: Negative.    Genitourinary: Negative.    Musculoskeletal:  Positive for arthralgias (Progressive right shoulder pain decreased range of motion).   Neurological: Negative.    Psychiatric/Behavioral: Negative.          Physical Exam  Vitals reviewed.   Constitutional:       Appearance: Normal appearance.   HENT:      Head: Normocephalic and atraumatic.      Mouth/Throat:      Mouth: Mucous membranes are moist.   Eyes:      Pupils: Pupils are equal, round, and reactive to light.      Comments: Glasses   Cardiovascular:      Rate and Rhythm: Normal rate and regular rhythm.   Pulmonary:      Effort: Pulmonary effort is normal.   Abdominal:      Palpations: Abdomen is soft.   Musculoskeletal:         General: Normal range of motion.      Cervical back: Normal range of motion.      Comments: Right shoulder decreased range of motion   Skin:     General: Skin is warm and dry.   Neurological:      Mental Status: She is alert and oriented to person, place, and time.   Psychiatric:     "     Mood and Affect: Mood normal.          PAT AIRWAY:   Airway:     Mallampati::  I    Neck ROM::  Full  normal        /82   Pulse 103   Temp 36 °C (96.8 °F) (Temporal)   Ht 1.575 m (5' 2\")   Wt 67.8 kg (149 lb 7.6 oz)   SpO2 100%   BMI 27.34 kg/m²       Stop Bang Score 0     CHADS 2 score: 1.9%  DASI score: 58.2  METS score: 9.9  Revised cardiac risk index: 0.4%  ASA I  ARISCAT 1.6%      Assessment and Plan:     Superior glenoid labrum lesion of right shoulder Plan: Right shoulder SLAP repair  Headaches  TMJ  Depression  ADHD        "

## 2024-01-09 ENCOUNTER — ANESTHESIA EVENT (OUTPATIENT)
Dept: OPERATING ROOM | Facility: HOSPITAL | Age: 39
End: 2024-01-09
Payer: COMMERCIAL

## 2024-01-09 ENCOUNTER — APPOINTMENT (OUTPATIENT)
Dept: PREADMISSION TESTING | Facility: HOSPITAL | Age: 39
End: 2024-01-09
Payer: COMMERCIAL

## 2024-01-09 ENCOUNTER — OFFICE VISIT (OUTPATIENT)
Dept: OBSTETRICS AND GYNECOLOGY | Facility: CLINIC | Age: 39
End: 2024-01-09
Payer: COMMERCIAL

## 2024-01-09 VITALS — WEIGHT: 150 LBS | DIASTOLIC BLOOD PRESSURE: 64 MMHG | SYSTOLIC BLOOD PRESSURE: 108 MMHG | BODY MASS INDEX: 27.44 KG/M2

## 2024-01-09 DIAGNOSIS — N93.0 POSTCOITAL BLEEDING: Primary | ICD-10-CM

## 2024-01-09 DIAGNOSIS — Z30.431 CHECKING OF INTRAUTERINE DEVICE: ICD-10-CM

## 2024-01-09 PROCEDURE — 99213 OFFICE O/P EST LOW 20 MIN: CPT | Performed by: OBSTETRICS & GYNECOLOGY

## 2024-01-09 PROCEDURE — 87205 SMEAR GRAM STAIN: CPT

## 2024-01-09 PROCEDURE — 1036F TOBACCO NON-USER: CPT | Performed by: OBSTETRICS & GYNECOLOGY

## 2024-01-09 RX ORDER — DOXYCYCLINE 100 MG/1
100 CAPSULE ORAL 2 TIMES DAILY
Qty: 14 CAPSULE | Refills: 0 | Status: SHIPPED | OUTPATIENT
Start: 2024-01-09 | End: 2024-01-16

## 2024-01-09 ASSESSMENT — ENCOUNTER SYMPTOMS
CONSTITUTIONAL NEGATIVE: 0
PSYCHIATRIC NEGATIVE: 0
NEUROLOGICAL NEGATIVE: 0
MUSCULOSKELETAL NEGATIVE: 0
ALLERGIC/IMMUNOLOGIC NEGATIVE: 0
GASTROINTESTINAL NEGATIVE: 0
CARDIOVASCULAR NEGATIVE: 0
RESPIRATORY NEGATIVE: 0
ENDOCRINE NEGATIVE: 0
HEMATOLOGIC/LYMPHATIC NEGATIVE: 0
EYES NEGATIVE: 0

## 2024-01-09 ASSESSMENT — PAIN SCALES - GENERAL: PAINLEVEL: 0-NO PAIN

## 2024-01-09 NOTE — PROGRESS NOTES
Subjective   Patient ID: Jen Franks is a 38 y.o. female who presents for IUD CHECK (Pt says she having some bleeding During [place].).  HPI      Spotting with intercourse - gotten worse since IUD. Not clots  Cramping since IUD but has gotten better.   Merina IUD since November.   US pelvis 12/13 - WNL      Cycle irregular - 15 day periods at a child   Pap - 2/23/21 wnl HPV neg         Review of Systems  All pertinent positive symptoms are included in the history of present illness.  All other systems have been reviewed and are negative and noncontributory to this patient's current ailments.    Objective   Physical Exam  Constitutional:       Appearance: She is obese.   Pulmonary:      Effort: Pulmonary effort is normal.   Genitourinary:     General: Normal vulva.      Vagina: Normal.      Cervix: Normal.      Uterus: Normal.       Adnexa: Right adnexa normal and left adnexa normal.      Rectum: Normal.      Comments: IUD strings visualized  Musculoskeletal:      Cervical back: Neck supple.   Skin:     General: Skin is warm.   Psychiatric:         Attention and Perception: Attention normal.         Mood and Affect: Mood normal.         Speech: Speech normal.         Behavior: Behavior normal.         Thought Content: Thought content normal.         Assessment/Plan     Impression:. IUD in place. Post coital bleeding (r/o low grade endometritis)  Diagnostic Plan:. BV swab  Treatment Plan:. Doxycycline x7 days      Sheridan Goel MD 01/09/24 11:15 AM

## 2024-01-10 LAB
CLUE CELLS VAG LPF-#/AREA: ABNORMAL /[LPF]
NUGENT SCORE: 1
YEAST VAG WET PREP-#/AREA: PRESENT

## 2024-01-11 ENCOUNTER — ANESTHESIA (OUTPATIENT)
Dept: OPERATING ROOM | Facility: HOSPITAL | Age: 39
End: 2024-01-11
Payer: COMMERCIAL

## 2024-01-11 ENCOUNTER — HOSPITAL ENCOUNTER (OUTPATIENT)
Facility: HOSPITAL | Age: 39
Setting detail: OUTPATIENT SURGERY
Discharge: HOME | End: 2024-01-11
Attending: ORTHOPAEDIC SURGERY | Admitting: ORTHOPAEDIC SURGERY
Payer: COMMERCIAL

## 2024-01-11 VITALS
RESPIRATION RATE: 16 BRPM | SYSTOLIC BLOOD PRESSURE: 94 MMHG | WEIGHT: 144.62 LBS | TEMPERATURE: 97.2 F | BODY MASS INDEX: 25.62 KG/M2 | HEART RATE: 60 BPM | DIASTOLIC BLOOD PRESSURE: 66 MMHG | OXYGEN SATURATION: 100 % | HEIGHT: 63 IN

## 2024-01-11 DIAGNOSIS — S43.431D SUPERIOR GLENOID LABRUM LESION OF RIGHT SHOULDER, SUBSEQUENT ENCOUNTER: Primary | ICD-10-CM

## 2024-01-11 LAB — HCG UR QL IA.RAPID: NEGATIVE

## 2024-01-11 PROCEDURE — A4649 SURGICAL SUPPLIES: HCPCS | Performed by: ORTHOPAEDIC SURGERY

## 2024-01-11 PROCEDURE — 7100000009 HC PHASE TWO TIME - INITIAL BASE CHARGE: Performed by: ORTHOPAEDIC SURGERY

## 2024-01-11 PROCEDURE — 29807 SHO ARTHRS SRG RPR SLAP LES: CPT | Performed by: ORTHOPAEDIC SURGERY

## 2024-01-11 PROCEDURE — 81025 URINE PREGNANCY TEST: CPT | Performed by: ORTHOPAEDIC SURGERY

## 2024-01-11 PROCEDURE — 3600000004 HC OR TIME - INITIAL BASE CHARGE - PROCEDURE LEVEL FOUR: Performed by: ORTHOPAEDIC SURGERY

## 2024-01-11 PROCEDURE — 3600000009 HC OR TIME - EACH INCREMENTAL 1 MINUTE - PROCEDURE LEVEL FOUR: Performed by: ORTHOPAEDIC SURGERY

## 2024-01-11 PROCEDURE — 3700000001 HC GENERAL ANESTHESIA TIME - INITIAL BASE CHARGE: Performed by: ORTHOPAEDIC SURGERY

## 2024-01-11 PROCEDURE — 2500000004 HC RX 250 GENERAL PHARMACY W/ HCPCS (ALT 636 FOR OP/ED): Performed by: ANESTHESIOLOGY

## 2024-01-11 PROCEDURE — 3700000002 HC GENERAL ANESTHESIA TIME - EACH INCREMENTAL 1 MINUTE: Performed by: ORTHOPAEDIC SURGERY

## 2024-01-11 PROCEDURE — 2780000003 HC OR 278 NO HCPCS: Performed by: ORTHOPAEDIC SURGERY

## 2024-01-11 PROCEDURE — 94760 N-INVAS EAR/PLS OXIMETRY 1: CPT

## 2024-01-11 PROCEDURE — 2720000007 HC OR 272 NO HCPCS: Performed by: ORTHOPAEDIC SURGERY

## 2024-01-11 PROCEDURE — 7100000010 HC PHASE TWO TIME - EACH INCREMENTAL 1 MINUTE: Performed by: ORTHOPAEDIC SURGERY

## 2024-01-11 PROCEDURE — 2500000004 HC RX 250 GENERAL PHARMACY W/ HCPCS (ALT 636 FOR OP/ED): Performed by: ORTHOPAEDIC SURGERY

## 2024-01-11 PROCEDURE — 7100000002 HC RECOVERY ROOM TIME - EACH INCREMENTAL 1 MINUTE: Performed by: ORTHOPAEDIC SURGERY

## 2024-01-11 PROCEDURE — 2500000005 HC RX 250 GENERAL PHARMACY W/O HCPCS: Performed by: ANESTHESIOLOGY

## 2024-01-11 PROCEDURE — 7100000001 HC RECOVERY ROOM TIME - INITIAL BASE CHARGE: Performed by: ORTHOPAEDIC SURGERY

## 2024-01-11 PROCEDURE — 76942 ECHO GUIDE FOR BIOPSY: CPT | Performed by: ANESTHESIOLOGY

## 2024-01-11 DEVICE — IMPLANTABLE DEVICE: Type: IMPLANTABLE DEVICE | Site: SHOULDER | Status: FUNCTIONAL

## 2024-01-11 RX ORDER — ROCURONIUM BROMIDE 10 MG/ML
INJECTION, SOLUTION INTRAVENOUS AS NEEDED
Status: DISCONTINUED | OUTPATIENT
Start: 2024-01-11 | End: 2024-01-11

## 2024-01-11 RX ORDER — GLYCOPYRROLATE 0.2 MG/ML
INJECTION INTRAMUSCULAR; INTRAVENOUS AS NEEDED
Status: DISCONTINUED | OUTPATIENT
Start: 2024-01-11 | End: 2024-01-11

## 2024-01-11 RX ORDER — FENTANYL CITRATE 50 UG/ML
INJECTION, SOLUTION INTRAMUSCULAR; INTRAVENOUS AS NEEDED
Status: DISCONTINUED | OUTPATIENT
Start: 2024-01-11 | End: 2024-01-11

## 2024-01-11 RX ORDER — MIDAZOLAM HYDROCHLORIDE 1 MG/ML
2 INJECTION, SOLUTION INTRAMUSCULAR; INTRAVENOUS ONCE
Status: COMPLETED | OUTPATIENT
Start: 2024-01-11 | End: 2024-01-11

## 2024-01-11 RX ORDER — FENTANYL CITRATE 50 UG/ML
25 INJECTION, SOLUTION INTRAMUSCULAR; INTRAVENOUS EVERY 5 MIN PRN
Status: DISCONTINUED | OUTPATIENT
Start: 2024-01-11 | End: 2024-01-11 | Stop reason: HOSPADM

## 2024-01-11 RX ORDER — ALBUTEROL SULFATE 0.83 MG/ML
2.5 SOLUTION RESPIRATORY (INHALATION) ONCE AS NEEDED
Status: DISCONTINUED | OUTPATIENT
Start: 2024-01-11 | End: 2024-01-11 | Stop reason: HOSPADM

## 2024-01-11 RX ORDER — DEXAMETHASONE SODIUM PHOSPHATE 4 MG/ML
INJECTION, SOLUTION INTRA-ARTICULAR; INTRALESIONAL; INTRAMUSCULAR; INTRAVENOUS; SOFT TISSUE AS NEEDED
Status: DISCONTINUED | OUTPATIENT
Start: 2024-01-11 | End: 2024-01-11

## 2024-01-11 RX ORDER — HYDRALAZINE HYDROCHLORIDE 20 MG/ML
5 INJECTION INTRAMUSCULAR; INTRAVENOUS EVERY 30 MIN PRN
Status: DISCONTINUED | OUTPATIENT
Start: 2024-01-11 | End: 2024-01-11 | Stop reason: HOSPADM

## 2024-01-11 RX ORDER — FENTANYL CITRATE 50 UG/ML
50 INJECTION, SOLUTION INTRAMUSCULAR; INTRAVENOUS ONCE
Status: COMPLETED | OUTPATIENT
Start: 2024-01-11 | End: 2024-01-11

## 2024-01-11 RX ORDER — CEFAZOLIN SODIUM 2 G/100ML
INJECTION, SOLUTION INTRAVENOUS AS NEEDED
Status: DISCONTINUED | OUTPATIENT
Start: 2024-01-11 | End: 2024-01-11

## 2024-01-11 RX ORDER — KETOROLAC TROMETHAMINE 30 MG/ML
INJECTION, SOLUTION INTRAMUSCULAR; INTRAVENOUS AS NEEDED
Status: DISCONTINUED | OUTPATIENT
Start: 2024-01-11 | End: 2024-01-11

## 2024-01-11 RX ORDER — SODIUM CHLORIDE, SODIUM LACTATE, POTASSIUM CHLORIDE, CALCIUM CHLORIDE 600; 310; 30; 20 MG/100ML; MG/100ML; MG/100ML; MG/100ML
50 INJECTION, SOLUTION INTRAVENOUS CONTINUOUS
Status: DISCONTINUED | OUTPATIENT
Start: 2024-01-11 | End: 2024-01-11 | Stop reason: HOSPADM

## 2024-01-11 RX ORDER — ONDANSETRON HYDROCHLORIDE 2 MG/ML
4 INJECTION, SOLUTION INTRAVENOUS ONCE AS NEEDED
Status: DISCONTINUED | OUTPATIENT
Start: 2024-01-11 | End: 2024-01-11 | Stop reason: HOSPADM

## 2024-01-11 RX ORDER — DIPHENHYDRAMINE HYDROCHLORIDE 50 MG/ML
12.5 INJECTION INTRAMUSCULAR; INTRAVENOUS ONCE AS NEEDED
Status: DISCONTINUED | OUTPATIENT
Start: 2024-01-11 | End: 2024-01-11 | Stop reason: HOSPADM

## 2024-01-11 RX ORDER — NEOSTIGMINE METHYLSULFATE 5 MG/5 ML
SYRINGE (ML) INTRAVENOUS AS NEEDED
Status: DISCONTINUED | OUTPATIENT
Start: 2024-01-11 | End: 2024-01-11

## 2024-01-11 RX ORDER — PROPOFOL 10 MG/ML
INJECTION, EMULSION INTRAVENOUS AS NEEDED
Status: DISCONTINUED | OUTPATIENT
Start: 2024-01-11 | End: 2024-01-11

## 2024-01-11 RX ORDER — ONDANSETRON HYDROCHLORIDE 2 MG/ML
INJECTION, SOLUTION INTRAVENOUS AS NEEDED
Status: DISCONTINUED | OUTPATIENT
Start: 2024-01-11 | End: 2024-01-11

## 2024-01-11 RX ORDER — LIDOCAINE HYDROCHLORIDE 10 MG/ML
INJECTION, SOLUTION EPIDURAL; INFILTRATION; INTRACAUDAL; PERINEURAL AS NEEDED
Status: DISCONTINUED | OUTPATIENT
Start: 2024-01-11 | End: 2024-01-11

## 2024-01-11 RX ORDER — CEFAZOLIN 1 G/1
2 INJECTION, POWDER, FOR SOLUTION INTRAVENOUS ONCE
Status: DISCONTINUED | OUTPATIENT
Start: 2024-01-11 | End: 2024-01-11 | Stop reason: HOSPADM

## 2024-01-11 RX ORDER — IPRATROPIUM BROMIDE 0.5 MG/2.5ML
500 SOLUTION RESPIRATORY (INHALATION) ONCE
Status: DISCONTINUED | OUTPATIENT
Start: 2024-01-11 | End: 2024-01-11 | Stop reason: HOSPADM

## 2024-01-11 RX ORDER — OXYCODONE AND ACETAMINOPHEN 5; 325 MG/1; MG/1
1 TABLET ORAL EVERY 4 HOURS PRN
Qty: 42 TABLET | Refills: 0 | Status: SHIPPED | OUTPATIENT
Start: 2024-01-11 | End: 2024-01-18

## 2024-01-11 RX ORDER — OXYCODONE HYDROCHLORIDE 5 MG/1
5 TABLET ORAL EVERY 4 HOURS PRN
Status: DISCONTINUED | OUTPATIENT
Start: 2024-01-11 | End: 2024-01-11 | Stop reason: HOSPADM

## 2024-01-11 RX ADMIN — ROCURONIUM BROMIDE 30 MG: 10 INJECTION, SOLUTION INTRAVENOUS at 12:08

## 2024-01-11 RX ADMIN — DEXAMETHASONE SODIUM PHOSPHATE 8 MG: 4 INJECTION, SOLUTION INTRAMUSCULAR; INTRAVENOUS at 12:23

## 2024-01-11 RX ADMIN — FENTANYL CITRATE 50 MCG: 0.05 INJECTION, SOLUTION INTRAMUSCULAR; INTRAVENOUS at 10:06

## 2024-01-11 RX ADMIN — LIDOCAINE HYDROCHLORIDE 5 ML: 10 INJECTION, SOLUTION EPIDURAL; INFILTRATION; INTRACAUDAL; PERINEURAL at 12:07

## 2024-01-11 RX ADMIN — CEFAZOLIN SODIUM 2 G: 2 INJECTION, SOLUTION INTRAVENOUS at 11:56

## 2024-01-11 RX ADMIN — FENTANYL CITRATE 25 MCG: 50 INJECTION INTRAMUSCULAR; INTRAVENOUS at 12:51

## 2024-01-11 RX ADMIN — SODIUM CHLORIDE, SODIUM LACTATE, POTASSIUM CHLORIDE, AND CALCIUM CHLORIDE 50 ML/HR: 600; 310; 30; 20 INJECTION, SOLUTION INTRAVENOUS at 09:39

## 2024-01-11 RX ADMIN — GLYCOPYRROLATE 0.4 MG: 0.2 INJECTION INTRAMUSCULAR; INTRAVENOUS at 12:46

## 2024-01-11 RX ADMIN — MIDAZOLAM 2 MG: 1 INJECTION INTRAMUSCULAR; INTRAVENOUS at 10:05

## 2024-01-11 RX ADMIN — PROPOFOL 150 MG: 10 INJECTION, EMULSION INTRAVENOUS at 12:07

## 2024-01-11 RX ADMIN — FENTANYL CITRATE 50 MCG: 50 INJECTION INTRAMUSCULAR; INTRAVENOUS at 12:07

## 2024-01-11 RX ADMIN — FENTANYL CITRATE 25 MCG: 50 INJECTION INTRAMUSCULAR; INTRAVENOUS at 12:58

## 2024-01-11 RX ADMIN — Medication 3 MG: at 12:46

## 2024-01-11 RX ADMIN — KETOROLAC TROMETHAMINE 30 MG: 30 INJECTION, SOLUTION INTRAMUSCULAR; INTRAVENOUS at 12:40

## 2024-01-11 RX ADMIN — ONDANSETRON 4 MG: 2 INJECTION, SOLUTION INTRAMUSCULAR; INTRAVENOUS at 12:40

## 2024-01-11 SDOH — HEALTH STABILITY: MENTAL HEALTH: CURRENT SMOKER: 0

## 2024-01-11 ASSESSMENT — PAIN SCALES - GENERAL
PAINLEVEL_OUTOF10: 3
PAINLEVEL_OUTOF10: 0 - NO PAIN
PAINLEVEL_OUTOF10: 7
PAIN_LEVEL: 0
PAINLEVEL_OUTOF10: 0 - NO PAIN

## 2024-01-11 ASSESSMENT — PAIN - FUNCTIONAL ASSESSMENT
PAIN_FUNCTIONAL_ASSESSMENT: 0-10
PAIN_FUNCTIONAL_ASSESSMENT: FLACC (FACE, LEGS, ACTIVITY, CRY, CONSOLABILITY)
PAIN_FUNCTIONAL_ASSESSMENT: 0-10

## 2024-01-11 ASSESSMENT — COLUMBIA-SUICIDE SEVERITY RATING SCALE - C-SSRS
1. IN THE PAST MONTH, HAVE YOU WISHED YOU WERE DEAD OR WISHED YOU COULD GO TO SLEEP AND NOT WAKE UP?: NO
2. HAVE YOU ACTUALLY HAD ANY THOUGHTS OF KILLING YOURSELF?: NO
6. HAVE YOU EVER DONE ANYTHING, STARTED TO DO ANYTHING, OR PREPARED TO DO ANYTHING TO END YOUR LIFE?: NO

## 2024-01-11 NOTE — PERIOPERATIVE NURSING NOTE
2 + radial on right hand warm to touch with capillary refill less than 2 seconds minimal movement or sensation to all digits on right hand r/t pre op nerve block

## 2024-01-11 NOTE — ANESTHESIA POSTPROCEDURE EVALUATION
Patient: Jen Franks    Procedure Summary       Date: 01/11/24 Room / Location: [facility] / Virtual [facility]    Anesthesia Start: 1155 Anesthesia Stop: 1304    Procedure: RIGHT SHOULDER SLAP REPAIR (Right: Shoulder) Diagnosis:       Superior glenoid labrum lesion of right shoulder, subsequent encounter      (Superior glenoid labrum lesion of right shoulder, subsequent encounter [S43.431D])    Surgeons: Tesfaye Monae MD Responsible Provider: Toya Damon MD MPH    Anesthesia Type: general, regional ASA Status: 2            Anesthesia Type: general, regional    Vitals Value Taken Time   BP 96/70 01/11/24 1308   Temp 36.0 01/11/24 1308   Pulse 85 01/11/24 1308   Resp 16 01/11/24 1308   SpO2 100 01/11/24 1308       Anesthesia Post Evaluation    Patient location during evaluation: PACU  Patient participation: complete - patient participated  Level of consciousness: awake and alert  Pain score: 0  Pain management: adequate  Multimodal analgesia pain management approach  Airway patency: patent  Two or more strategies used to mitigate risk of obstructive sleep apnea  Cardiovascular status: acceptable  Respiratory status: acceptable  Hydration status: acceptable  Postoperative Nausea and Vomiting: none      No notable events documented.

## 2024-01-11 NOTE — OP NOTE
RIGHT SHOULDER SLAP REPAIR (R) Operative Note     Date: 2024  OR Location: [facility]    Name: Jen Franks, : 1985, Age: 38 y.o., MRN: [id number], Sex: female    Diagnosis  Pre-op Diagnosis     * Superior glenoid labrum lesion of right shoulder, subsequent encounter [S43.431D] Post-op Diagnosis     * Superior glenoid labrum lesion of right shoulder, subsequent encounter [S43.431D]     Procedures  RIGHT SHOULDER SLAP REPAIR  09030 - [place] SURGICAL ARTHROSCOPY SHOULDER REPAIR SLAP LESION      Surgeons      * Tesfaye Monae - Primary    Resident/Fellow/Other Assistant:  Surgeon(s) and Role:    Procedure Summary  Anesthesia: General  ASA: II  Anesthesia Staff: Anesthesiologist: Toya Damon MD MPH  Estimated Blood Loss: 10 mL  Intra-op Medications: * No intraprocedure medications in log *           Anesthesia Record               Intraprocedure I/O Totals          Intake    lactated Ringer's infusion 117.50 mL    Total Intake 117.5 mL          Specimen: No specimens collected     Staff:   Circulator: Nelsy Ferreira RN  Scrub Person: Brenda Lambert; Patty Pérez         Drains and/or Catheters: * None in log *    Tourniquet Times:         Implants:  Implants       Type Name Action Serial No.      Implant Y-JAJA PRO FLEX 1.3 MM ANCHOR #2 HI-FI Implanted [id number]              Findings: SLAP lesion from 12:00 to 2:00 with unstable biceps anchor right shoulder    Indications: Jen Franks is an 38 y.o. female who is having surgery for Superior glenoid labrum lesion of right shoulder, subsequent encounter [S47.686D].  Patient has persistent symptoms in her right shoulder consistent with a SLAP lesion noted on MRI scan was counseled on options for treatment including surgical and nonoperative treatment potential benefits possible risks alternatives and rehabilitation sequence required after surgery were explained to the patient did request and schedule surgery provided informed consent on the date of the  procedure    The patient was seen in the preoperative area. The risks, benefits, complications, treatment options, non-operative alternatives, expected recovery and outcomes were discussed with the patient. The possibilities of reaction to medication, pulmonary aspiration, injury to surrounding structures, bleeding, recurrent infection, the need for additional procedures, failure to diagnose a condition, and creating a complication requiring transfusion or operation were discussed with the patient. The patient concurred with the proposed plan, giving informed consent.  The site of surgery was properly noted/marked if necessary per policy. The patient has been actively warmed in preoperative area. Preoperative antibiotics have been ordered and given within 1 hours of incision. Venous thrombosis prophylaxis have been ordered including bilateral sequential compression devices    Procedure Details: Anesthesia was consulted for postoperative pain management preoperatively status and interscalene nerve block on the right side patient then transferred to the operating placed supine the operating table timeout was called the marked site confirmed patient identification confirmed procedure reviewed allergies reviewed and antibiotics administered general endotracheal anesthesia staff successfully patient procedure position all bony prominences were carefully padded sequential compression devices were placed on patient's legs for DVT prophylaxis right extremity sterilely prepped with Betadine and sterilely draped diagnostic arthroscopy performed revealed noted above the undersurface of the labrum was debrided and the anterior superior corner of the glenoid was decorticated in preparation for repair of the labrum from the sublingual foramen to the 1130 o'clock position.  2 anchors were used 1 in front of the biceps tendon and one behind the biceps tendon to stabilize the biceps anchor and labrum this was successfully due to  excellent restoration of the labrum to the prepared bed.  Incisions were then closed with absorbable and nonabsorbable sutures and sterile dressings were applied.  Patient was awakened and transferred to the recovery room in no complications.  Complications:  None; patient tolerated the procedure well.    Disposition: PACU - hemodynamically stable.  Condition: stable         Additional Details: None    Attending Attestation:     Tesfaye Monae  Phone Number: [phone]

## 2024-01-11 NOTE — ANESTHESIA PREPROCEDURE EVALUATION
Patient: Jen Franks    Procedure Information       Date/Time: 01/11/24 1035    Procedure: RIGHT SHOULDER SLAP REPAIR (Right: Shoulder)    Location: [facility] / Virtual [facility]    Surgeons: Tesfaye Monae MD            Relevant Problems   Anesthesia (within normal limits)      Cardiovascular (within normal limits)      Endocrine   (+) Multiple thyroid nodules      GI (within normal limits)      /Renal (within normal limits)      Neuro/Psych   (+) Cervical radiculopathy      Pulmonary (within normal limits)      GI/Hepatic (within normal limits)      Hematology (within normal limits)      Musculoskeletal (within normal limits)      Eyes, Ears, Nose, and Throat (within normal limits)      Infectious Disease (within normal limits)       Clinical information reviewed:   Tobacco  Allergies  Meds   Med Hx  Surg Hx  OB Status  Fam Hx  Soc   Hx      Allergies   Allergen Reactions    Eggs-Apples-Oats [Nutritional Supplement-Fiber] GI Upset     Eggs ONLY    Meperidine Other     PANIC ATTACK     Prior to Admission medications    Medication Sig Start Date End Date Taking? Authorizing Provider   busPIRone (Buspar) 10 mg tablet Take 1 tablet (10 mg) by mouth 3 times a day. 6/7/21  Yes Historical Provider, MD   calcium carbonate/vitamin D3 (CALTRATE WITH VITAMIN D3 ORAL) Take 1 tablet by mouth once daily. CALTRATE BONE HEALTH 600 PLUS 20 MCG VIT D 3   Yes Historical Provider, MD   cariprazine (Vraylar) 1.5 mg capsule Take 1 capsule (1.5 mg) by mouth once daily. MORNING 1/28/22  Yes Historical Provider, MD   cetirizine (ZyrTEC) 10 mg tablet Take 1 tablet (10 mg) by mouth once daily. MORNING   Yes Historical Provider, MD   cyanocobalamin (Vitamin B-12) 1,000 mcg tablet Take 1 tablet (1,000 mcg) by mouth once daily.   Yes Historical Provider, MD   diphenhydrAMINE (Sominex) 25 mg tablet Take 1 tablet (25 mg) by mouth 2 times a day as needed for sleep, allergies or itching. 25 MG - 50 MG   Yes Historical Provider, MD    glucos sul 2KCl/msm/chond/C/Mn (GLUCOSAMINE CHONDROITIN ORAL) Take 1 tablet by mouth once daily. 4050  MG   Yes Historical Provider, MD   lisdexamfetamine (Vyvanse) 70 mg capsule Take 1 capsule (70 mg) by mouth once daily in the morning.   Yes Historical Provider, MD   magnesium oxide (Mag-Ox) 400 mg tablet Take 1 tablet (400 mg) by mouth once daily.   Yes Historical Provider, MD   propranolol (Inderal) 20 mg tablet Take 1 tablet (20 mg) by mouth once daily. EVERY MORNING FOR TREATMENT OF ANXIETY 6/28/21  Yes Historical Provider, MD   spironolactone (Aldactone) 50 mg tablet Take 1 tablet (50 mg) by mouth 2 times a day. ACNE   Yes Historical Provider, MD   VALERIAN ROOT ORAL Take 2,400 mg by mouth once daily.   Yes Historical Provider, MD   doxycycline (Vibramycin) 100 mg capsule Take 1 capsule (100 mg) by mouth 2 times a day for 7 days. Take with at least 8 ounces (large glass) of water, do not lie down for 30 minutes after 1/9/24 1/16/24  Sheridan Goel MD   fluticasone (Flonase) 50 mcg/actuation nasal spray 2 sprays once daily as needed for rhinitis or allergies. 12/19/16   Historical Provider, MD   oxyCODONE-acetaminophen (Percocet) 5-325 mg tablet Take 1 tablet by mouth every 4 hours if needed for severe pain (7 - 10) for up to 7 days. 1/11/24 1/18/24  Tesfaye Monae MD   amphetamine-dextroamphetamine (Adderall) 5 mg tablet Amphetamine-Dextroamphetamine 5 MG Oral Tablet   Refills: 0        Start : 1-Dec-2016   Active 12/1/16 1/8/24  Historical Provider, MD   lisdexamfetamine (Vyvanse) 50 mg capsule Vyvanse 50 MG Oral Capsule   Quantity: 30  Refills: 0        Start : 26-Jan-2022   Active 1/26/22 1/8/24  Historical Provider, MD   meloxicam (Mobic) 15 mg tablet Take 1 tablet (15 mg) by mouth once daily. START AFTER FINISHED WITH MEDROL DOSE PACK  1/8/24  Historical Provider, MD     Past Medical History:   Diagnosis Date    ADHD (attention deficit hyperactivity disorder)     Adverse effect of anesthesia      Postop agitation    Anxiety     Other conditions influencing health status 01/31/2017    History of frequent headaches    Other specified disorders of temporomandibular joint 01/31/2017    Temporomandibular jaw dysfunction     Past Surgical History:   Procedure Laterality Date    KNEE Bilateral     Arthroscopy    OTHER SURGICAL HISTORY  01/31/2022    Knee surgery         NPO Detail:  NPO/Void Status  Date of Last Liquid: 01/10/24  Time of Last Liquid: 2200  Date of Last Solid: 01/10/24  Time of Last Solid: 2200  Last Intake Type: Clear fluids  Time of Last Void: 0900         Physical Exam    Airway  Mallampati: I  TM distance: >3 FB     Cardiovascular - normal exam     Dental - normal exam     Pulmonary - normal exam     Abdominal - normal exam             Anesthesia Plan    History of general anesthesia?: yes  History of complications of general anesthesia?: no    ASA 2     general and regional   (Interscalene block)  The patient is not a current smoker.  Patient was not previously instructed to abstain from smoking on day of procedure.  Patient did not smoke on day of procedure.  Education provided regarding risk of obstructive sleep apnea.  intravenous induction   Anesthetic plan and risks discussed with patient.    Plan discussed with CRNA and CAA.

## 2024-01-11 NOTE — ANESTHESIA PROCEDURE NOTES
Peripheral Block    Patient location during procedure: pre-op  Start time: 1/11/2024 10:09 AM  End time: 1/11/2024 10:10 AM  Reason for block: at surgeon's request and post-op pain management  Staffing  Performed: attending   Authorized by: Joe Castro MD    Performed by: Joe Castro MD  Preanesthetic Checklist  Completed: patient identified, IV checked, site marked, risks and benefits discussed, surgical consent, monitors and equipment checked, pre-op evaluation and timeout performed   Timeout performed at: 1/11/2024 10:04 AM  Peripheral Block  Patient position: laying flat  Prep: ChloraPrep  Patient monitoring: heart rate, cardiac monitor and continuous pulse ox  Block type: interscalene  Laterality: left  Injection technique: single-shot  Guidance: ultrasound guided  Needle  Needle type: short-bevel   Needle gauge: 22 G  Needle length: 5 cm  Needle localization: ultrasound guidance     image stored in chart  Test dose: negative  Assessment  Injection assessment: no paresthesia on injection, negative aspiration for heme, incremental injection and local visualized surrounding nerve on ultrasound  Paresthesia pain: none  Heart rate change: no  Slow fractionated injection: yes  Additional Notes  Ropivicaine 20 ml 0.5% with 5 mg decadron preservative free.    Patient able to flex and extend hand post nerve block.    Ultrasound guidance used-- able to visualize needle, Nerve roots and local anesthetic  filling around the nerve roots as it is injected.

## 2024-01-11 NOTE — ANESTHESIA PROCEDURE NOTES
Airway  Date/Time: 1/11/2024 12:09 PM  Urgency: elective    Airway not difficult    Staffing  Performed: attending   Authorized by: Toya Damon MD MPH    Performed by: Toya Damon MD MPH  Patient location during procedure: OR    Indications and Patient Condition  Indications for airway management: anesthesia  Spontaneous Ventilation: absent  Sedation level: deep  Preoxygenated: yes  Patient position: sniffing  Mask difficulty assessment: 1 - vent by mask    Final Airway Details  Final airway type: endotracheal airway      Successful airway: ETT  Cuffed: yes   Successful intubation technique: direct laryngoscopy  Facilitating devices/methods: intubating stylet  Endotracheal tube insertion site: oral  Blade: Xavi  Blade size: #3  ETT size (mm): 7.0  Cormack-Lehane Classification: grade I - full view of glottis  Placement verified by: chest auscultation and capnometry   Measured from: teeth  Number of attempts at approach: 1

## 2024-01-11 NOTE — H&P
History Of Present Illness  Jen Franks is a 38 y.o. female presenting with patient has pain in the right shoulder recalcitrant to nonsurgical treatment consistent with a SLAP lesion identified by MRI.     Past Medical History  Past Medical History:   Diagnosis Date    ADHD (attention deficit hyperactivity disorder)     Adverse effect of anesthesia     Postop agitation    Anxiety     Other conditions influencing health status 01/31/2017    History of frequent headaches    Other specified disorders of temporomandibular joint 01/31/2017    Temporomandibular jaw dysfunction       Surgical History  Past Surgical History:   Procedure Laterality Date    KNEE Bilateral     Arthroscopy    OTHER SURGICAL HISTORY  01/31/2022    Knee surgery        Social History  She reports that she quit smoking about 7 years ago. Her smoking use included cigarettes. She has never been exposed to tobacco smoke. She has never used smokeless tobacco. She reports that she does not currently use alcohol. She reports that she does not currently use drugs.    Family History  No family history on file.     Allergies  Eggs-apples-oats [nutritional supplement-fiber] and Meperidine    Review of Systems     Physical Exam     Last Recorded Vitals  There were no vitals taken for this visit.    Relevant Results        MRI confirms superior labrum tear right shoulder     Assessment/Plan   Principal Problem:    Superior glenoid labrum lesion of right shoulder      Plan for arthroscopy and labral repair       I spent 15 minutes in the professional and overall care of this patient.      Tesfaye Monae MD

## 2024-01-12 ASSESSMENT — PAIN SCALES - GENERAL: PAINLEVEL_OUTOF10: 3

## 2024-01-17 ENCOUNTER — OFFICE VISIT (OUTPATIENT)
Dept: ORTHOPEDIC SURGERY | Facility: HOSPITAL | Age: 39
End: 2024-01-17
Payer: COMMERCIAL

## 2024-01-17 VITALS — BODY MASS INDEX: 25.69 KG/M2 | WEIGHT: 145 LBS | HEIGHT: 63 IN

## 2024-01-17 DIAGNOSIS — S43.431D SUPERIOR GLENOID LABRUM LESION OF RIGHT SHOULDER, SUBSEQUENT ENCOUNTER: Primary | ICD-10-CM

## 2024-01-17 PROCEDURE — 99024 POSTOP FOLLOW-UP VISIT: CPT | Performed by: PHYSICIAN ASSISTANT

## 2024-01-17 PROCEDURE — 1036F TOBACCO NON-USER: CPT | Performed by: PHYSICIAN ASSISTANT

## 2024-01-17 ASSESSMENT — PAIN DESCRIPTION - DESCRIPTORS: DESCRIPTORS: ACHING;SHARP;SHOOTING

## 2024-01-17 ASSESSMENT — PAIN SCALES - GENERAL: PAINLEVEL_OUTOF10: 6

## 2024-01-17 ASSESSMENT — PAIN - FUNCTIONAL ASSESSMENT: PAIN_FUNCTIONAL_ASSESSMENT: 0-10

## 2024-01-17 NOTE — PROGRESS NOTES
Patient presents to clinic today for first postoperative visit after right shoulder labral repair.  Overall she is doing well with manage pain with over-the-counter pain medication and occasional narcotic pain medication.  She has physical therapy set up for tomorrow.    Examination: Incisions are healing well no surrounding erythema active drainage or signs of active infection.  Light sensation is intact  strength 5/5, palpable distal radial pulse.    Impression: Right SLAP lesion    Plan: She will continue with sling usage for the next 6 weeks.  She will begin formal therapy we have given her a formal therapy referral today.  She may continue with over-the-counter pain medication weaning off narcotics.  We discussed specific restrictions and discussed intraoperative findings and pictures in detail today.  She will return to our office in 3 weeks.    Yanelis Moreno PA-C  Department of Orthopaedic Surgery  [facility]    Dictation performed with the use of voice recognition software. Syntax and grammatical errors may exist.

## 2024-01-17 NOTE — PROGRESS NOTES
Physical Therapy  Physical Therapy Orthopedic Evaluation    Patient Name: Jen Franks  MRN: [id number]  Today's Date: 1/18/2024  Time Calculation  Start Time: 1645  Stop Time: 1730  Time Calculation (min): 45 min    Insurance:  Visit number: 1 of 60  Authorization info: no auth needed  Insurance Type: [employer]    General:  Reason for visit: Right shoulder pain s/p SLAP repair 1/11/24  Referred by: Dr. Monae    Current Problem  1. Acute pain of right shoulder  Follow Up In Physical Therapy      2. Right shoulder pain, unspecified chronicity  Referral to Physical Therapy          Precautions  STEADI Fall Risk Score (The score of 4 or more indicates an increased risk of falling): 2  Precautions Comment: Gentle AAROM and isometrics (ER/IR/Abd), sling for 3 more weeks, no resisted biceps for 1 month    Medical History Form: Reviewed (scanned into chart)    Subjective:     Chief Complaint: Patient presents to clinic with right shoulder pain s/p SLAP repair. Pain has increased since appt with PA yesterday. Has been icing and wearing sling all the time.  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     Red Flags: Do you have any of the following? No  Fever/chills, unexplained weight changes, dizziness/fainting, unexplained change in bowel or bladder functions, unexplained malaise or muscle  weakness, night pain/sweats, numbness or tingling    Objective:    Posture: FHP, rounded shoulders in sling    Palpation: TTP around R shoulder incision     Flexibility: mod tight R pec and bicep      ROM     Shoulder PROM (Degrees)      (R)  (L)  Flexion: 60  170      Abduction: 45  170     ER at 0:  5  45     IR at 0:  To stomach To stomach            Elbow AROM (Degrees)      (R)                          (L)  Flexion: WFL                         WFL      Extension: 5 degrees from straight WFL             Strength Testing    UE strength MMT: deferred      Special Tests    Radicular Symptoms: none      Outcome Measures:  Other Measures  Other Outcome Measures: 84.09 (QuickDASH)     EDUCATION: home exercise program, plan of care, activity modifications, pain management, and injury pathology       Goals: Set and discussed today  Active       PT Problem       STG       Start:  01/18/24    Expected End:  04/17/24       Patient will decrease pain to 0-2/10 in 4 weeks.   Patient will increase strength by >/= 1/2 mm grade in 4 weeks.   QuickDASH: -8 in 4 weeks.               LTG       Start:  01/18/24    Expected End:  04/17/24       Patient will increase UE flexibility by 10 degrees in 8 weeks.   Patient will be able to lift > 5 lbs without increased pain in 8 weeks.  Patient will be able to reach into cupboard without increased pain in 8 weeks.              Plan of care was developed with input and agreement by the patient      Treatment Performed:  Access Code: [email or web address]    Exercises  - Flexion-Extension Shoulder Pendulum with Table Support (Mirrored)  - 2 x daily - 7 x weekly - 2 sets - 10 reps  - Circular Shoulder Pendulum with Table Support  - 2 x daily - 7 x weekly - 2 sets - 10 reps  - Horizontal Shoulder Pendulum with Table Support  - 2 x daily - 7 x weekly - 2 sets - 10 reps  - Seated Elbow Flexion and Extension AROM  - 2 x daily - 7 x weekly - 2 sets - 10 reps  - Wrist AROM Flexion Extension  - 2 x daily - 7 x  weekly - 2 sets - 10 reps  - Towel Roll  with Forearm in Neutral  - 2 x daily - 7 x weekly - 2 sets - 10 reps - 10 sec hold  - Seated Shoulder Flexion Towel Slide at Table Top  - 2 x daily - 7 x weekly - 2 sets - 10 reps  - Seated Shoulder Abduction Towel Slide at Table Top (Mirrored)  - 2 x daily - 7 x weekly - 2 sets - 10 reps      Charges: Low complexity eval, TE, Self care    Assessment: Patient presents with signs and symptoms consistent with right shoulder pain s/p SLAP repair, resulting in limited participation in pain-free ADLs and inability to perform at their prior level of function. Pt would benefit from physical therapy to address the impairments found & listed previously in the objective section in order to return to safe and pain-free ADLs and prior level of function.       Plan:     Planned Interventions include: therapeutic exercise, self-care home management, manual therapy, therapeutic activities, gait training, neuromuscular coordination, vasopneumatic, dry needling, aquatic therapy  Frequency: 1-2 x Week  Duration: 12 Weeks    Penelope Ojeda, PT

## 2024-01-18 ENCOUNTER — EVALUATION (OUTPATIENT)
Dept: PHYSICAL THERAPY | Facility: CLINIC | Age: 39
End: 2024-01-18
Payer: COMMERCIAL

## 2024-01-18 DIAGNOSIS — M25.511 ACUTE PAIN OF RIGHT SHOULDER: Primary | ICD-10-CM

## 2024-01-18 DIAGNOSIS — M25.511 RIGHT SHOULDER PAIN, UNSPECIFIED CHRONICITY: ICD-10-CM

## 2024-01-18 PROCEDURE — 97535 SELF CARE MNGMENT TRAINING: CPT | Mod: GP

## 2024-01-18 PROCEDURE — 97110 THERAPEUTIC EXERCISES: CPT | Mod: GP

## 2024-01-18 PROCEDURE — 97161 PT EVAL LOW COMPLEX 20 MIN: CPT | Mod: GP

## 2024-01-18 ASSESSMENT — ENCOUNTER SYMPTOMS
LOSS OF SENSATION IN FEET: 0
DEPRESSION: 0
OCCASIONAL FEELINGS OF UNSTEADINESS: 0

## 2024-01-18 ASSESSMENT — PAIN - FUNCTIONAL ASSESSMENT: PAIN_FUNCTIONAL_ASSESSMENT: 0-10

## 2024-01-18 ASSESSMENT — PAIN SCALES - GENERAL: PAINLEVEL_OUTOF10: 7

## 2024-01-23 ENCOUNTER — TREATMENT (OUTPATIENT)
Dept: PHYSICAL THERAPY | Facility: CLINIC | Age: 39
End: 2024-01-23
Payer: COMMERCIAL

## 2024-01-23 DIAGNOSIS — M25.511 ACUTE PAIN OF RIGHT SHOULDER: Primary | ICD-10-CM

## 2024-01-23 PROCEDURE — 97110 THERAPEUTIC EXERCISES: CPT | Mod: GP

## 2024-01-23 PROCEDURE — 97140 MANUAL THERAPY 1/> REGIONS: CPT | Mod: GP

## 2024-01-23 PROCEDURE — 97016 VASOPNEUMATIC DEVICE THERAPY: CPT | Mod: GP

## 2024-01-23 ASSESSMENT — PAIN - FUNCTIONAL ASSESSMENT: PAIN_FUNCTIONAL_ASSESSMENT: 0-10

## 2024-01-23 ASSESSMENT — PAIN SCALES - GENERAL: PAINLEVEL_OUTOF10: 6

## 2024-01-24 NOTE — PROGRESS NOTES
Physical Therapy  Physical Therapy Treatment    Patient Name: Jen Franks  MRN: [id number]  Today's Date: 1/25/2024  Time Calculation  Start Time: 1625  Stop Time: 1705  Time Calculation (min): 40 min    Insurance:  Visit number: 3 of 60  Authorization info: no auth needed  Insurance Type: Cigna     General:  Reason for visit: Right shoulder pain s/p SLAP repair 1/11/24  Referred by: Dr. Monae       Current Problem  1. Acute pain of right shoulder            Precautions  Precautions Comment: Gentle AAROM and isometrics (ER/IR/Abd), sling for 3 more weeks, no resisted biceps for 1 month    Pain Assessment: 0-10  Pain Score: 7    Subjective:   Patient reports that she was out of the house the last couple days and her shoulder is more sore today.     HEP Performed:  Yes    Objective:   Right shoulder AAROM in supine: 115 deg    Treatments:   Pulleys flex 3'  Supine AAROM wand flex, press ups, ER x 12 each  PROM R shoulder  STM R shoulder, bicep, rhomboid, UT, levator  Elbow AROM stretch x 15  Pendulums four directions x 15    Grx10', R shoulder, low comp    Access Code: [email or web address]    Charges: TE, Man, Vaso    Assessment: Pt with more soreness today so didn't progress AAROM. If feeling better next week, can progress AAROM and isometrics (IR/ER/Abd) to tolerance.      Plan: Continue to increase shoulder flexibility and strength per MD protocol.     Penelope Ojeda, PT

## 2024-01-25 ENCOUNTER — TREATMENT (OUTPATIENT)
Dept: PHYSICAL THERAPY | Facility: CLINIC | Age: 39
End: 2024-01-25
Payer: COMMERCIAL

## 2024-01-25 DIAGNOSIS — M25.511 ACUTE PAIN OF RIGHT SHOULDER: Primary | ICD-10-CM

## 2024-01-25 PROCEDURE — 97110 THERAPEUTIC EXERCISES: CPT | Mod: GP

## 2024-01-25 PROCEDURE — 97016 VASOPNEUMATIC DEVICE THERAPY: CPT | Mod: GP

## 2024-01-25 PROCEDURE — 97140 MANUAL THERAPY 1/> REGIONS: CPT | Mod: GP

## 2024-01-25 ASSESSMENT — PAIN SCALES - GENERAL: PAINLEVEL_OUTOF10: 7

## 2024-01-25 ASSESSMENT — PAIN - FUNCTIONAL ASSESSMENT: PAIN_FUNCTIONAL_ASSESSMENT: 0-10

## 2024-01-29 ENCOUNTER — TREATMENT (OUTPATIENT)
Dept: PHYSICAL THERAPY | Facility: CLINIC | Age: 39
End: 2024-01-29
Payer: COMMERCIAL

## 2024-01-29 DIAGNOSIS — M25.511 ACUTE PAIN OF RIGHT SHOULDER: Primary | ICD-10-CM

## 2024-01-29 PROCEDURE — 97110 THERAPEUTIC EXERCISES: CPT | Mod: GP,CQ | Performed by: SPECIALIST/TECHNOLOGIST

## 2024-01-29 PROCEDURE — 97016 VASOPNEUMATIC DEVICE THERAPY: CPT | Mod: GP,CQ | Performed by: SPECIALIST/TECHNOLOGIST

## 2024-01-29 PROCEDURE — 97140 MANUAL THERAPY 1/> REGIONS: CPT | Mod: GP,CQ | Performed by: SPECIALIST/TECHNOLOGIST

## 2024-01-29 ASSESSMENT — PAIN - FUNCTIONAL ASSESSMENT: PAIN_FUNCTIONAL_ASSESSMENT: 0-10

## 2024-01-29 ASSESSMENT — PAIN SCALES - GENERAL: PAINLEVEL_OUTOF10: 6

## 2024-01-29 NOTE — PROGRESS NOTES
Physical Therapy  Physical Therapy Treatment    Patient Name: Jen Franks  MRN: [id number]  Today's Date: 1/29/2024  Time Calculation  Start Time: 1645  Stop Time: 1725  Time Calculation (min): 40 min    Insurance:  Visit number: 3 of 60  Authorization info: no auth needed  Insurance Type: Cigna     General:  Reason for visit: Right shoulder pain s/p SLAP repair 1/11/24  Referred by: Dr. Monae  Reason for Referral: R SLAP Repair  Referred By: ALLYSSA Monae MD    Current Problem  1. Acute pain of right shoulder              Precautions  STEADI Fall Risk Score (The score of 4 or more indicates an increased risk of falling): 2  Precautions Comment: Gentle AAROM and isometrics (ER/IR/Abd), sling for 3 more weeks, no resisted biceps for 1 month    Pain Assessment: 0-10  Pain Score: 6    Subjective:   Patient reports that she was out of the house the last couple days and her shoulder is more sore today.     HEP Performed:  Yes    Objective:   Right shoulder AAROM in supine: 115 deg    Treatments:   Pulleys flex 3'  Supine AAROM wand flex, press ups, ER x 12 each  PROM R shoulder  Iso flex/abd/ext 10x5s  STM R shoulder,   R Shoulder mobs inf  5 min   Elbow AROM stretch x 15  Pendulums four directions x 15    Grx10', R shoulder, low comp    Access Code: [email or web address]    Charges: TE, Man, Vaso    Assessment: Pt with more soreness today so didn't progress AAROM. If feeling better next week, can progress AAROM and isometrics (IR/ER/Abd) to tolerance.      Plan: Continue to increase shoulder flexibility and strength per MD protocol.     Piter Castillo, PTA

## 2024-01-31 ENCOUNTER — TREATMENT (OUTPATIENT)
Dept: PHYSICAL THERAPY | Facility: CLINIC | Age: 39
End: 2024-01-31
Payer: COMMERCIAL

## 2024-01-31 DIAGNOSIS — M25.511 ACUTE PAIN OF RIGHT SHOULDER: Primary | ICD-10-CM

## 2024-01-31 PROCEDURE — 97016 VASOPNEUMATIC DEVICE THERAPY: CPT | Mod: GP,CQ | Performed by: SPECIALIST/TECHNOLOGIST

## 2024-01-31 PROCEDURE — 97110 THERAPEUTIC EXERCISES: CPT | Mod: GP,CQ | Performed by: SPECIALIST/TECHNOLOGIST

## 2024-01-31 PROCEDURE — 97140 MANUAL THERAPY 1/> REGIONS: CPT | Mod: GP,CQ | Performed by: SPECIALIST/TECHNOLOGIST

## 2024-01-31 NOTE — PROGRESS NOTES
Physical Therapy  Physical Therapy Treatment    Patient Name: Jen Franks  MRN: [id number]  Today's Date: 1/31/2024  Time Calculation  Start Time: 1636  Stop Time: 1726  Time Calculation (min): 50 min    Insurance:  Visit number: 4 of 60  Authorization info: no auth needed  Insurance Type: C[employer]     General:  Reason for visit: Right shoulder pain s/p SLAP repair 1/11/24  Referred by: Dr. Monae  Reason for Referral: R SLAP Repair  Referred By: ALLYSSA Monae MD    Current Problem  1. Acute pain of right shoulder            Precautions  STEADI Fall Risk Score (The score of 4 or more indicates an increased risk of falling): 2  Precautions Comment: Gentle AAROM and isometrics (ER/IR/Abd), sling for 3 more weeks, no resisted biceps for 1 month         Subjective:   Patient reports that she was out of the house the last couple days and her shoulder is more sore today.     HEP Performed:  Yes    Objective:   Right shoulder AAROM in supine: 115 deg    Treatments:   Pulleys flex 3'  Supine AAROM wand flex, press ups, ER x 12 each  PROM R shoulder  Iso flex/abd/ext 10x5s  STM R shoulder,   R Shoulder mobs inf  5 min   Elbow AROM stretch x 15  Pendulums four directions x 15    Grx15', R shoulder, low comp    Access Code: [email or web address]    Charges: TE, Man, Vaso    Assessment: Patient tolerated intensity with mild fatigue noting feeling good post treatment.  Returned to AAROM & isometrecs       Plan: Continue to increase shoulder flexibility and strength per MD protocol.     Piter Castillo, PTA

## 2024-02-05 ENCOUNTER — TREATMENT (OUTPATIENT)
Dept: PHYSICAL THERAPY | Facility: CLINIC | Age: 39
End: 2024-02-05
Payer: COMMERCIAL

## 2024-02-05 DIAGNOSIS — M25.511 ACUTE PAIN OF RIGHT SHOULDER: ICD-10-CM

## 2024-02-05 PROCEDURE — 97016 VASOPNEUMATIC DEVICE THERAPY: CPT | Mod: GP,CQ | Performed by: SPECIALIST/TECHNOLOGIST

## 2024-02-05 PROCEDURE — 97140 MANUAL THERAPY 1/> REGIONS: CPT | Mod: GP,CQ | Performed by: SPECIALIST/TECHNOLOGIST

## 2024-02-05 PROCEDURE — 97110 THERAPEUTIC EXERCISES: CPT | Mod: GP,CQ | Performed by: SPECIALIST/TECHNOLOGIST

## 2024-02-05 ASSESSMENT — PAIN - FUNCTIONAL ASSESSMENT: PAIN_FUNCTIONAL_ASSESSMENT: 0-10

## 2024-02-05 ASSESSMENT — PAIN SCALES - GENERAL: PAINLEVEL_OUTOF10: 6

## 2024-02-05 NOTE — PROGRESS NOTES
Physical Therapy  Physical Therapy Treatment    Patient Name: Jen Franks  MRN: [id number]  Today's Date: 2/5/2024  Time Calculation  Start Time: 1515  Stop Time: 1610  Time Calculation (min): 55 min    Insurance:  Visit number: 5 of 60  Authorization info: no auth needed  Insurance Type: C[employer]a     General:  Reason for visit: Right shoulder pain s/p SLAP repair 1/11/24  Referred by: Dr. Monae  Reason for Referral: R SLAP Repair  Referred By: ALLYSSA Monae MD    Current Problem  1. Acute pain of right shoulder  Follow Up In Physical Therapy          Precautions  STEADI Fall Risk Score (The score of 4 or more indicates an increased risk of falling): 2  Precautions Comment: Gentle AAROM and isometrics (ER/IR/Abd), sling for 3 more weeks, no resisted biceps for 1 month    Pain Assessment: 0-10  Pain Score: 6    Subjective:   Patient reports mildly more pain on arrival today 6.5 of 10.  Patient noted feeling ok after last session. Patient follows up with MD on Wednesday    HEP Performed:  Yes    Objective:   Right shoulder AAROM in supine: 115 deg    Treatments:   Pulleys flex 3'  Supine AAROM wand flex, press ups, ER x 12 each  PROM R shoulder  Iso flex/abd/ext 10x5s  STM R shoulder,   R Shoulder mobs inf  5 min   Elbow AROM stretch x 15  Pendulums four directions x 15    Grx15', R shoulder, low comp    Access Code: [email or web address]    Charges: TE x2, Man, Vaso (cq)     Assessment: Patient tolerated intensity without fatigue noting feeling good post treatment.  Patient did having mild discomfort with abduction which decreased with PROM repetition.      Plan: Continue to increase shoulder flexibility and strength per MD protocol.     Piter Castillo, PTA

## 2024-02-07 ENCOUNTER — OFFICE VISIT (OUTPATIENT)
Dept: ORTHOPEDIC SURGERY | Facility: HOSPITAL | Age: 39
End: 2024-02-07
Payer: COMMERCIAL

## 2024-02-07 ENCOUNTER — TREATMENT (OUTPATIENT)
Dept: PHYSICAL THERAPY | Facility: CLINIC | Age: 39
End: 2024-02-07
Payer: COMMERCIAL

## 2024-02-07 DIAGNOSIS — S43.431D SUPERIOR GLENOID LABRUM LESION OF RIGHT SHOULDER, SUBSEQUENT ENCOUNTER: Primary | ICD-10-CM

## 2024-02-07 DIAGNOSIS — M25.511 ACUTE PAIN OF RIGHT SHOULDER: Primary | ICD-10-CM

## 2024-02-07 PROCEDURE — 97110 THERAPEUTIC EXERCISES: CPT | Mod: GP,CQ | Performed by: SPECIALIST/TECHNOLOGIST

## 2024-02-07 PROCEDURE — 99024 POSTOP FOLLOW-UP VISIT: CPT | Performed by: ORTHOPAEDIC SURGERY

## 2024-02-07 PROCEDURE — 1036F TOBACCO NON-USER: CPT | Performed by: ORTHOPAEDIC SURGERY

## 2024-02-07 PROCEDURE — 97016 VASOPNEUMATIC DEVICE THERAPY: CPT | Mod: GP,CQ | Performed by: SPECIALIST/TECHNOLOGIST

## 2024-02-07 PROCEDURE — 97140 MANUAL THERAPY 1/> REGIONS: CPT | Mod: GP,CQ | Performed by: SPECIALIST/TECHNOLOGIST

## 2024-02-07 ASSESSMENT — PAIN - FUNCTIONAL ASSESSMENT: PAIN_FUNCTIONAL_ASSESSMENT: 0-10

## 2024-02-07 ASSESSMENT — PAIN SCALES - GENERAL: PAINLEVEL_OUTOF10: 4

## 2024-02-07 NOTE — PROGRESS NOTES
Physical Therapy  Physical Therapy Treatment    Patient Name: Jen Franks  MRN: [id number]  Today's Date: 2/7/2024       Insurance:  Visit number: 5 of 60  Authorization info: no auth needed  Insurance Type: [employer]     General:  Reason for visit: Right shoulder pain s/p SLAP repair 1/11/24  Referred by: Dr. Monae  Reason for Referral: R SLAP Repair  Referred By: ALLYSSA Monae MD    Current Problem  1. Acute pain of right shoulder              Precautions  STEADI Fall Risk Score (The score of 4 or more indicates an increased risk of falling): 2  Precautions Comment: Gentle AAROM and isometrics (ER/IR/Abd), sling for 3 more weeks, no resisted biceps for 1 month    Pain Assessment: 0-10  Pain Score: 4    Subjective:   Patient reports less pain on arrival today 4 of 10.  Patient saw MD who noted 2 more weeks in sling about a month to starting strengthening.      HEP Performed:  Yes    Objective:   Right shoulder AAROM in supine: 115 deg    Treatments:   Pulleys flex 3'  Supine AAROM wand flex, press ups, ER x 12 each  PROM R shoulder  Iso flex/abd/ext 10x5s  STM R shoulder,   R Shoulder mobs inf  5 min   Elbow AROM stretch x 15  Pendulums four directions x 15    Grx15', R shoulder, low comp    Access Code: [email or web address]    Charges: TE x2, Man, Vaso (cq)     Assessment: Patient tolerated intensity without fatigue noting feeling good post treatment.  Patient did having mild discomfort with abduction.       Plan: Continue to increase shoulder flexibility and strength per MD protocol.     Piter Castillo, PTA

## 2024-02-07 NOTE — PROGRESS NOTES
Patient is here for 1 month follow-up status post SLAP lesion repair doing very well in general.  Tolerating therapy.  Compliant with sling.    Right shoulder all incisions of healed well external rotation 15 degrees internal rotation anterior iliac crest elevation 95 degrees without pain.  Biceps contraction gently without pain.    SLAP lesion right shoulder    Patient is doing well status post right shoulder surgery.  2 more weeks and sling then can discontinue may begin driving.  Follow-up in 4 weeks.    This was dictated using voice recognition software and not corrected for grammatical or spelling errors.

## 2024-02-09 NOTE — PROGRESS NOTES
"Physical Therapy  Physical Therapy Treatment    Patient Name: Jen Franks  MRN: [id number]  Today's Date: 2/12/2024  Time Calculation  Start Time: 1430  Stop Time: 1520  Time Calculation (min): 50 min    Insurance:  Visit number: 6 of 60  Authorization info: no auth needed  Insurance Type: C[employer]a     General:  Reason for visit: Right shoulder pain s/p SLAP repair 1/11/24  Referred by: Dr. Monae       Current Problem  1. Acute pain of right shoulder            Precautions  Precautions Comment: Gentle AAROM and isometrics (ER/IR/Abd), sling for 2 more weeks, no resisted biceps for 1 month    Pain Assessment: 0-10  Pain Score: 4    Subjective:   Patient reports that she is having trouble sleeping through the night.     HEP Performed:  Yes    Objective:   Right shoulder AAROM in supine: 120 deg    Treatments:   Pulleys flex, scap 3' each  Towel on wall flex 5\" hold x 10- HEP  Towel on wall circles cw/ccw x 10 each- HEP  Standing wand flex, abd, IR behind back, ext x 10 each- HEP  PROM R shoulder  STM R shoulder  Iso flex/abd/ext 10x5s  GRx10', R shoulder, low comp    Access Code: [email or web address]    Charges: TE x2, Man, Vaso    Assessment: Patient able to tolerate progression to standing AAROM today so added to HEP.      Plan: Continue to increase shoulder flexibility and strength per MD protocol.     Penelope Ojeda, PT  "

## 2024-02-12 ENCOUNTER — TREATMENT (OUTPATIENT)
Dept: PHYSICAL THERAPY | Facility: CLINIC | Age: 39
End: 2024-02-12
Payer: COMMERCIAL

## 2024-02-12 DIAGNOSIS — M25.511 ACUTE PAIN OF RIGHT SHOULDER: Primary | ICD-10-CM

## 2024-02-12 PROCEDURE — 97016 VASOPNEUMATIC DEVICE THERAPY: CPT | Mod: GP

## 2024-02-12 PROCEDURE — 97140 MANUAL THERAPY 1/> REGIONS: CPT | Mod: GP

## 2024-02-12 PROCEDURE — 97110 THERAPEUTIC EXERCISES: CPT | Mod: GP

## 2024-02-12 ASSESSMENT — PAIN SCALES - GENERAL: PAINLEVEL_OUTOF10: 4

## 2024-02-12 ASSESSMENT — PAIN - FUNCTIONAL ASSESSMENT: PAIN_FUNCTIONAL_ASSESSMENT: 0-10

## 2024-02-14 ENCOUNTER — TREATMENT (OUTPATIENT)
Dept: PHYSICAL THERAPY | Facility: CLINIC | Age: 39
End: 2024-02-14
Payer: COMMERCIAL

## 2024-02-14 DIAGNOSIS — M25.511 ACUTE PAIN OF RIGHT SHOULDER: Primary | ICD-10-CM

## 2024-02-14 PROCEDURE — 97016 VASOPNEUMATIC DEVICE THERAPY: CPT | Mod: GP,CQ | Performed by: SPECIALIST/TECHNOLOGIST

## 2024-02-14 PROCEDURE — 97140 MANUAL THERAPY 1/> REGIONS: CPT | Mod: GP,CQ | Performed by: SPECIALIST/TECHNOLOGIST

## 2024-02-14 PROCEDURE — 97110 THERAPEUTIC EXERCISES: CPT | Mod: GP,CQ | Performed by: SPECIALIST/TECHNOLOGIST

## 2024-02-14 ASSESSMENT — PAIN SCALES - GENERAL: PAINLEVEL_OUTOF10: 3

## 2024-02-14 ASSESSMENT — PAIN - FUNCTIONAL ASSESSMENT: PAIN_FUNCTIONAL_ASSESSMENT: 0-10

## 2024-02-14 NOTE — PROGRESS NOTES
"Physical Therapy  Physical Therapy Treatment    Patient Name: Jen Franks  MRN: [id number]  Today's Date: 2/14/2024  Time Calculation  Start Time: 1506  Stop Time: 1601  Time Calculation (min): 55 min    Insurance:  Visit number: 7 of 60  Authorization info: no auth needed  Insurance Type: [employer]     General:  Reason for visit: Right shoulder pain s/p SLAP repair 1/11/24  Referred by: Dr. Monae  Reason for Referral: R SLAP Repair  Referred By: ALLYSSA Monae MD    Current Problem  1. Acute pain of right shoulder              Precautions  STEADI Fall Risk Score (The score of 4 or more indicates an increased risk of falling): 2  Precautions Comment: Gentle AAROM and isometrics (ER/IR/Abd), sling for 2 more weeks, no resisted biceps for 1 month    Pain Assessment: 0-10  Pain Score: 3    Subjective:   Patient reports pain 3 of 10 on arrival still wearing sling.  Patient notes some soreness especially with circular motions.  Patient notes still sleep interrupted (usually by about 4 AM).    HEP Performed:  Yes    Objective:   Right shoulder AAROM in supine: 130 deg    Treatments:   Pulleys flex, scap 3' each  Towel on wall flex 5\" hold x 10- HEP  Towel on wall circles cw/ccw x 10 each- HEP  Standing wand abd, IR behind back, ext x 20 each- HEP  Supine wand flexion, bench press movement 20x   PROM R shoulder  STM R shoulder  Iso flex/abd/ext 10x5s  GRx10', R shoulder, low comp    Access Code: [email or web address]    Charges: TE x2, Man, Vaso    Assessment: Patient tolerated intensity noting feeling improving mobility with AAROM     Plan: Continue to increase shoulder flexibility and strength per MD protocol.     Piter Castillo PTA  "

## 2024-02-20 ENCOUNTER — TREATMENT (OUTPATIENT)
Dept: PHYSICAL THERAPY | Facility: CLINIC | Age: 39
End: 2024-02-20
Payer: COMMERCIAL

## 2024-02-20 DIAGNOSIS — M25.511 ACUTE PAIN OF RIGHT SHOULDER: Primary | ICD-10-CM

## 2024-02-20 PROCEDURE — 97016 VASOPNEUMATIC DEVICE THERAPY: CPT | Mod: GP

## 2024-02-20 PROCEDURE — 97140 MANUAL THERAPY 1/> REGIONS: CPT | Mod: GP

## 2024-02-20 PROCEDURE — 97110 THERAPEUTIC EXERCISES: CPT | Mod: GP

## 2024-02-20 ASSESSMENT — PAIN SCALES - GENERAL: PAINLEVEL_OUTOF10: 4

## 2024-02-20 ASSESSMENT — PAIN - FUNCTIONAL ASSESSMENT: PAIN_FUNCTIONAL_ASSESSMENT: 0-10

## 2024-02-20 NOTE — PROGRESS NOTES
"Physical Therapy  Physical Therapy Treatment    Patient Name: Jen Franks  MRN: [id number]  Today's Date: 2/20/2024  Time Calculation  Start Time: 1615  Stop Time: 1705  Time Calculation (min): 50 min    Insurance:  Visit number: 8 of 60  Authorization info: no auth needed  Insurance Type: C[employer]a     General:  Reason for visit: Right shoulder pain s/p SLAP repair 1/11/24  Referred by: Dr. Monae       Current Problem  1. Acute pain of right shoulder            Precautions  Precautions Comment: Gentle AAROM and isometrics (ER/IR/Abd), sling for 2 more weeks, no resisted biceps for 1 month    Pain Assessment: 0-10  Pain Score: 4    Subjective:   Patient reports that she is still having trouble sleeping. Standing exercises are getting easier. Only wears sling when she sleeps.    HEP Performed:  Yes    Objective:   Right shoulder AAROM in supine: 130 deg    Treatments:   Pulleys flex, scap 3' each  Towel on wall flex 5\" hold x 15  Towel on wall circles cw/ccw x 15 each  Standing wand flex, abd, IR behind back, ext x 15 each  Supine wand flexion, ER 10\" hold x 15 each  Supine wand chest press x 20  Supine AROM flexion 5\" hold x 10  PROM R shoulder  STM R shoulder  Iso flex/abd/ext 10x5s  GRx10', R shoulder, low comp    Access Code: [email or web address]    Charges: TE x2, Man, Vaso    Assessment: Patient still limited in AAROM abduction but flexion has improved.     Plan: Continue to increase shoulder flexibility and strength per MD protocol.     Penelope Ojeda, PT  "

## 2024-02-22 NOTE — PROGRESS NOTES
"Physical Therapy  Physical Therapy Treatment    Patient Name: Jen Franks  MRN: [id number]  Today's Date: 2/23/2024  Time Calculation  Start Time: 0915  Stop Time: 1005  Time Calculation (min): 50 min    Insurance:  Visit number: 9 of 60  Authorization info: no auth needed  Insurance Type: Cigna     General:  Reason for visit: Right shoulder pain s/p SLAP repair 1/11/24  Referred by: Dr. Monae       Current Problem  1. Acute pain of right shoulder            Precautions  Precautions Comment: Gentle AAROM and isometrics (ER/IR/Abd), sling for 2 more weeks, no resisted biceps for 1 month    Pain Assessment: 0-10  Pain Score: 2    Subjective:   Patient reports that she started Trazadone and is sleeping better.    HEP Performed:  Yes    Objective:   Right shoulder AAROM in supine: 130 deg    Treatments:   Pulleys flex, scap 3' each  Standing shoulder flex, scap, abd x 10 each- HEP  Towel on wall flex 5\" hold x 15  Towel on wall circles cw/ccw x 15 each  Standing wand flex, abd, IR behind back, ext x 15 each  Supine wand flexion, ER 10\" hold x 15 each  Supine wand chest press x 20  Supine AROM flexion 10\" hold x 10  PROM R shoulder  STM R shoulder  Iso flex/abd/ext 10x5s  GRx10', R shoulder, low comp    Access Code: [email or web address]    Charges: TE x2, Man, Vaso    Assessment: Patient able to complete AROM to shoulder height- abduction was the most challenging. Cued to avoid shoulder hike.    Plan: Continue to increase shoulder flexibility and strength per MD protocol.     Penelope Ojeda, PT  "

## 2024-02-23 ENCOUNTER — TREATMENT (OUTPATIENT)
Dept: PHYSICAL THERAPY | Facility: CLINIC | Age: 39
End: 2024-02-23
Payer: COMMERCIAL

## 2024-02-23 DIAGNOSIS — M25.511 ACUTE PAIN OF RIGHT SHOULDER: Primary | ICD-10-CM

## 2024-02-23 PROCEDURE — 97016 VASOPNEUMATIC DEVICE THERAPY: CPT | Mod: GP

## 2024-02-23 PROCEDURE — 97140 MANUAL THERAPY 1/> REGIONS: CPT | Mod: GP

## 2024-02-23 PROCEDURE — 97110 THERAPEUTIC EXERCISES: CPT | Mod: GP

## 2024-02-23 ASSESSMENT — PAIN SCALES - GENERAL: PAINLEVEL_OUTOF10: 2

## 2024-02-23 ASSESSMENT — PAIN - FUNCTIONAL ASSESSMENT: PAIN_FUNCTIONAL_ASSESSMENT: 0-10

## 2024-02-26 NOTE — PROGRESS NOTES
Physical Therapy  Physical Therapy Orthopedic Progress Note    Patient Name: Jen Franks  MRN: [id number]  Today's Date: 2/27/2024  Time Calculation  Start Time: 1615  Stop Time: 1705  Time Calculation (min): 50 min    Insurance:  Visit number: 10 of 60  Authorization info: no auth needed  Insurance Type: Cigna     General:  Reason for visit: Right shoulder pain s/p SLAP repair 1/11/24  Referred by: Dr. Monae    Current Problem  1. Acute pain of right shoulder            Precautions  Precautions Comment: Gentle AAROM and isometrics (ER/IR/Abd), sling for 2 more weeks, no resisted biceps for 1 month      Subjective:  Patient reports that she overdid it over the weekend and is a little more sore today.    Current Condition:   Better    Pain:  Pain Assessment: 0-10  Pain Score: 4    Self Reported Function (0-100%) = 50%  (100% being back to PLOF)    Objective:  Posture: FHP, rounded shoulders     Palpation: denies TTP     Flexibility: min tight R pec and bicep        ROM                Shoulder AROM (Degrees)                             (R)                    (L)  Flexion:            90                     170                                             Abduction:       80                     170                                 Functional ER:   top shoulder      C7                                  Functional IR:     buttock           T4                                                                          Elbow AROM (Degrees)                             (R)                                            (L)  Flexion:            WFL                                         WFL                                            Extension:        WFL                                         WFL                                                                 Strength Testing     UE strength MMT: deferred                             Special Tests     Radicular Symptoms: none      Outcome Measures: Updated 2/27/2024  Other  "Measures  Other Outcome Measures: 68.18 (QuickDASH)     EDUCATION: home exercise program, plan of care, activity modifications, pain management, and injury pathology       Goals: Updated 2/27/2024  Active       PT Problem       STG (Progressing)       Start:  01/18/24    Expected End:  04/17/24       Patient will decrease pain to 0-2/10 in 4 weeks.   Patient will increase strength by >/= 1/2 mm grade in 4 weeks.   QuickDASH: -8 in 4 weeks.               LTG (Progressing)       Start:  01/18/24    Expected End:  04/17/24       Patient will increase UE flexibility by 10 degrees in 8 weeks.   Patient will be able to lift > 5 lbs without increased pain in 8 weeks.  Patient will be able to reach into cupboard without increased pain in 8 weeks.              Plan of care was developed with input and agreement by the patient      Treatment Performed:  Pulleys flex, scap 3' each  Recheck completed  Standing shoulder flex, scap, abd x 10 each  IR behind back stretch 30\" x 4  S/L shoulder ER 10x2  S/L shoulder abduction 10x2  Towel on wall flex 5\" hold x 15  Towel on wall circles cw/ccw x 15 each  Standing wand flex, abd, IR behind back, ext x 15 each  Supine AROM flexion 10\" hold x 10  PROM R shoulder  STM R shoulder  GRx10', R shoulder, low comp     Access Code: [email or web address]     Charges: TE x2, Man, Vaso    Assessment: Patient has demonstrated improvement in UE pain-free AROM. Will continue to progress to full range and then work on strengthening.       Plan: Updated 2/27/2024     Planned Interventions include: therapeutic exercise, self-care home management, manual therapy, therapeutic activities, gait training, neuromuscular coordination, vasopneumatic, dry needling, aquatic therapy  Frequency: 1-2 x Week  Duration: 6 Weeks    Penelope Ojeda, PT  "

## 2024-02-27 ENCOUNTER — TREATMENT (OUTPATIENT)
Dept: PHYSICAL THERAPY | Facility: CLINIC | Age: 39
End: 2024-02-27
Payer: COMMERCIAL

## 2024-02-27 DIAGNOSIS — M25.511 ACUTE PAIN OF RIGHT SHOULDER: Primary | ICD-10-CM

## 2024-02-27 PROCEDURE — 97110 THERAPEUTIC EXERCISES: CPT | Mod: GP

## 2024-02-27 PROCEDURE — 97016 VASOPNEUMATIC DEVICE THERAPY: CPT | Mod: GP

## 2024-02-27 PROCEDURE — 97140 MANUAL THERAPY 1/> REGIONS: CPT | Mod: GP

## 2024-02-27 ASSESSMENT — PAIN - FUNCTIONAL ASSESSMENT: PAIN_FUNCTIONAL_ASSESSMENT: 0-10

## 2024-02-27 ASSESSMENT — PAIN SCALES - GENERAL: PAINLEVEL_OUTOF10: 4

## 2024-02-28 NOTE — PROGRESS NOTES
"Physical Therapy  Physical Therapy Treatment    Patient Name: Jen Franks  MRN: [id number]  Today's Date: 2/29/2024  Time Calculation  Start Time: 1615  Stop Time: 1705  Time Calculation (min): 50 min    Insurance:  Visit number: 11 of 60  Authorization info: no auth needed  Insurance Type: C[employer]a     General:  Reason for visit: Right shoulder pain s/p SLAP repair 1/11/24  Referred by: Dr. Monae       Current Problem  1. Acute pain of right shoulder  Follow Up In Physical Therapy          Precautions  Precautions Comment: Gentle AAROM and isometrics (ER/IR/Abd), sling for 2 more weeks, no resisted biceps for 1 month    Pain Assessment: 0-10  Pain Score: 4    Subjective:   Patient reports that she is able to lift arm higher than last visit. Still having trouble sleeping through the night.    HEP Performed:  Yes    Objective:   Right shoulder AROM flexion : 115 deg, abduction: 90 deg    Treatments:   Pulleys flex, scap 3' each  Standing shoulder flex, scap, abd x 15 each  IR behind back stretch 30\" x 4  S/L shoulder ER 15x2  S/L shoulder abduction 15x2  Towel on wall flex 5\" hold x 15  Towel on wall circles cw/ccw x 15 each  Standing wand flex, abd, IR behind back, ext x 15 each  Supine AROM flexion 10\" hold x 10  Supine serratus punches 15x2  PROM R shoulder  STM R shoulder  GRx10', R shoulder, low comp     Access Code: [email or web address]     Charges: TE x2, Man, Vaso    Assessment: Patient with significant improvement in AROM flexion today.     Plan: Continue to increase shoulder flexibility and strength per MD protocol.     Penelope Ojeda, PT  "

## 2024-02-29 ENCOUNTER — TREATMENT (OUTPATIENT)
Dept: PHYSICAL THERAPY | Facility: CLINIC | Age: 39
End: 2024-02-29
Payer: COMMERCIAL

## 2024-02-29 DIAGNOSIS — M25.511 ACUTE PAIN OF RIGHT SHOULDER: ICD-10-CM

## 2024-02-29 PROCEDURE — 97140 MANUAL THERAPY 1/> REGIONS: CPT | Mod: GP

## 2024-02-29 PROCEDURE — 97016 VASOPNEUMATIC DEVICE THERAPY: CPT | Mod: GP

## 2024-02-29 PROCEDURE — 97110 THERAPEUTIC EXERCISES: CPT | Mod: GP

## 2024-02-29 ASSESSMENT — PAIN SCALES - GENERAL: PAINLEVEL_OUTOF10: 4

## 2024-02-29 ASSESSMENT — PAIN - FUNCTIONAL ASSESSMENT: PAIN_FUNCTIONAL_ASSESSMENT: 0-10

## 2024-03-04 NOTE — PROGRESS NOTES
"Physical Therapy  Physical Therapy Treatment    Patient Name: Jen Franks  MRN: [id number]  Today's Date: 3/4/2024       Insurance:  Visit number: 12 of 60  Authorization info: no auth needed  Insurance Type: [employer]a     General:  Reason for visit: Right shoulder pain s/p SLAP repair 1/11/24  Referred by: Dr. Monae       Current Problem  No diagnosis found.                Subjective:   Patient reports that she is able to lift arm higher than last visit. Still having trouble sleeping through the night.    HEP Performed:  Yes    Objective:   Right shoulder AROM flexion : 115 deg, abduction: 90 deg    Treatments:   Pulleys flex, scap 3' each  Standing shoulder flex, scap, abd x 15 each  IR behind back stretch 30\" x 4  S/L shoulder ER 15x2  S/L shoulder abduction 15x2  Towel on wall flex 5\" hold x 15  Towel on wall circles cw/ccw x 15 each  Standing wand flex, abd, IR behind back, ext x 15 each  Supine AROM flexion 10\" hold x 10  Supine serratus punches 15x2  PROM R shoulder  STM R shoulder  GRx10', R shoulder, low comp     Access Code: [email or web address]     Charges: TE x2, Man, Vaso    Assessment: Patient with significant improvement in AROM flexion today.     Plan: Continue to increase shoulder flexibility and strength per MD protocol.     Penelope Ojeda, PT  "

## 2024-03-05 ENCOUNTER — APPOINTMENT (OUTPATIENT)
Dept: PHYSICAL THERAPY | Facility: CLINIC | Age: 39
End: 2024-03-05
Payer: COMMERCIAL

## 2024-03-06 ENCOUNTER — OFFICE VISIT (OUTPATIENT)
Dept: ORTHOPEDIC SURGERY | Facility: HOSPITAL | Age: 39
End: 2024-03-06
Payer: COMMERCIAL

## 2024-03-06 DIAGNOSIS — S43.431D SUPERIOR GLENOID LABRUM LESION OF RIGHT SHOULDER, SUBSEQUENT ENCOUNTER: Primary | ICD-10-CM

## 2024-03-06 PROCEDURE — 99024 POSTOP FOLLOW-UP VISIT: CPT | Performed by: ORTHOPAEDIC SURGERY

## 2024-03-06 RX ORDER — ATOMOXETINE 10 MG/1
CAPSULE ORAL
COMMUNITY
Start: 2024-02-27 | End: 2024-06-03

## 2024-03-06 NOTE — PROGRESS NOTES
Patient is 2 months status post right shoulder repair of labrum.  She is doing quite well.    Right shoulder elevation 170 motor power in abduction 5/5.  No pain with passive or active motion.    SLAP lesion right shoulder    Recommended continued progression of therapy and limited modified activities increasing gradually as tolerated.  Follow-up in 1 month.

## 2024-03-06 NOTE — LETTER
March 13, 2024     Patient: Jen Franks   YOB: 1985   Date of Visit: 3/6/2024       To Whom It May Concern:    It is my medical opinion that Jen Franks may return to full duty immediately with no restrictions on 3/11/24.    If you have any questions or concerns, please don't hesitate to call.         Sincerely,        Tesfaye Monae MD    CC: No Recipients

## 2024-03-06 NOTE — LETTER
March 13, 2024     Patient: Jen Franks   YOB: 1985   Date of Visit: 3/6/2024       To Whom It May Concern:    It is my medical opinion that Jen Franks may return to full duty immediately with no restrictions.    If you have any questions or concerns, please don't hesitate to call.         Sincerely,        Tesfaye Monae MD    CC: No Recipients

## 2024-03-07 ENCOUNTER — TREATMENT (OUTPATIENT)
Dept: PHYSICAL THERAPY | Facility: CLINIC | Age: 39
End: 2024-03-07
Payer: COMMERCIAL

## 2024-03-07 DIAGNOSIS — M25.511 ACUTE PAIN OF RIGHT SHOULDER: ICD-10-CM

## 2024-03-07 PROCEDURE — 97016 VASOPNEUMATIC DEVICE THERAPY: CPT | Mod: GP,CQ | Performed by: SPECIALIST/TECHNOLOGIST

## 2024-03-07 PROCEDURE — 97110 THERAPEUTIC EXERCISES: CPT | Mod: GP,CQ | Performed by: SPECIALIST/TECHNOLOGIST

## 2024-03-07 PROCEDURE — 97140 MANUAL THERAPY 1/> REGIONS: CPT | Mod: GP,CQ | Performed by: SPECIALIST/TECHNOLOGIST

## 2024-03-07 NOTE — PROGRESS NOTES
"Physical Therapy  Physical Therapy Treatment    Patient Name: Jen Franks  MRN: [id number]  Today's Date: 3/7/2024  Time Calculation  Start Time: 1550  Stop Time: 1645  Time Calculation (min): 55 min    Insurance:  Visit number: 12 of 60  Authorization info: no auth needed  Insurance Type: C[employer]a     General:  Reason for visit: Right shoulder pain s/p SLAP repair 1/11/24  Referred by: Dr. Monae       Current Problem  1. Acute pain of right shoulder  Follow Up In Physical Therapy                    Subjective:   Patient reports that she is able to lift arm higher than last visit. Still having trouble sleeping through the night.    HEP Performed:  Yes    Objective:   Right shoulder AROM flexion : 115 deg, abduction: 90 deg    Treatments:   Pulleys flex, scap 3' each  Standing shoulder flex, scap, abd x 20 each  IR behind back stretch 30\" x 4  S/L shoulder ER 15x2  S/L shoulder abduction 15x2  Towel on wall flex 5\" hold x 15  Towel on wall circles cw/ccw x 15 each  Pulleys hand back 3'   Supine AROM flexion 10\" hold x 10  Supine serratus punches 15x2  PROM R shoulder  STM R shoulder  GRx10', R shoulder, low comp     Access Code: [email or web address]     Charges: TE x2, Man, Vaso    Assessment: Patient with improved AROM although  with ext rotation and sidelying abduction.     Plan: Continue to increase shoulder flexibility and strength per MD protocol.     Piter Castillo, PTA  "

## 2024-03-11 NOTE — PROGRESS NOTES
"Physical Therapy  Physical Therapy Treatment    Patient Name: Jen Franks  MRN: [id number]  Today's Date: 3/12/2024  Time Calculation  Start Time: 1615  Stop Time: 1705  Time Calculation (min): 50 min    Insurance:  Visit number: 13 of 60  Authorization info: no auth needed  Insurance Type: C[employer]a     General:  Reason for visit: Right shoulder pain s/p SLAP repair 1/11/24  Referred by: Dr. Monae       Current Problem  1. Acute pain of right shoulder  Follow Up In Physical Therapy        Precautions  Precautions Comment: Gentle AAROM and isometrics (ER/IR/Abd), sling for 2 more weeks, no resisted biceps for 1 month    Pain Assessment: 0-10  Pain Score: 3    Subjective:   Patient reports that MD was happy with her progress. Went back to work this week and just feels some soreness but no sharp pain. Has been able to lay on right shoulder a little longer recently.    HEP Performed:  Yes    Objective:   Right shoulder AROM flexion : 115 deg, abduction: 90 deg    Treatments:   Pulleys flex, scap 3' each  RTB rows with scap retraction 5\" hold, 10x2- HEP  RTB shoulder ext 10x2- HEP  RTB ER no money 10x2- HEP  RTB shoulder horiz abd 10x2- HEP  Standing shoulder flex, scap, abd x 15 each (1# DBs)  Standing punches x 15 B (1# DBs)  IR behind back stretch 30\" x 4  PROM R shoulder  GRx10', R shoulder, low comp     Access Code: [email or web address]     Charges: TE x3, Vaso    Assessment: Patient did well with postural strengthening initiation so added to HEP.    Plan: Continue to increase shoulder flexibility and strength per MD protocol.     Penelope Ojeda, PT  "

## 2024-03-12 ENCOUNTER — TREATMENT (OUTPATIENT)
Dept: PHYSICAL THERAPY | Facility: CLINIC | Age: 39
End: 2024-03-12
Payer: COMMERCIAL

## 2024-03-12 DIAGNOSIS — M25.511 ACUTE PAIN OF RIGHT SHOULDER: ICD-10-CM

## 2024-03-12 PROCEDURE — 97016 VASOPNEUMATIC DEVICE THERAPY: CPT | Mod: GP

## 2024-03-12 PROCEDURE — 97110 THERAPEUTIC EXERCISES: CPT | Mod: GP

## 2024-03-12 ASSESSMENT — PAIN - FUNCTIONAL ASSESSMENT: PAIN_FUNCTIONAL_ASSESSMENT: 0-10

## 2024-03-12 ASSESSMENT — PAIN SCALES - GENERAL: PAINLEVEL_OUTOF10: 3

## 2024-03-14 ENCOUNTER — TREATMENT (OUTPATIENT)
Dept: PHYSICAL THERAPY | Facility: CLINIC | Age: 39
End: 2024-03-14
Payer: COMMERCIAL

## 2024-03-14 DIAGNOSIS — M25.511 ACUTE PAIN OF RIGHT SHOULDER: ICD-10-CM

## 2024-03-14 PROCEDURE — 97016 VASOPNEUMATIC DEVICE THERAPY: CPT | Mod: GP

## 2024-03-14 PROCEDURE — 97110 THERAPEUTIC EXERCISES: CPT | Mod: GP

## 2024-03-14 ASSESSMENT — PAIN - FUNCTIONAL ASSESSMENT: PAIN_FUNCTIONAL_ASSESSMENT: 0-10

## 2024-03-14 ASSESSMENT — PAIN SCALES - GENERAL: PAINLEVEL_OUTOF10: 0 - NO PAIN

## 2024-03-14 NOTE — PROGRESS NOTES
"Physical Therapy  Physical Therapy Treatment    Patient Name: Jen Franks  MRN: [id number]  Today's Date: 3/14/2024  Time Calculation  Start Time: 1615  Stop Time: 1705  Time Calculation (min): 50 min    Insurance:  Visit number: 14 of 60  Authorization info: no auth needed  Insurance Type: [employer]a     General:  Reason for visit: Right shoulder pain s/p SLAP repair 1/11/24  Referred by: Dr. Monae       Current Problem  1. Acute pain of right shoulder  Follow Up In Physical Therapy        Pain Assessment: 0-10  Pain Score: 0 - No pain    Subjective:   Patient reports that band exercises are going well with no issues.    HEP Performed:  Yes    Objective:   Right shoulder AROM flexion : 120 deg, abduction: 100 deg    Treatments:   UBE, seat 5, L5, F/R 2.5' each   Pulleys flex, scap 3' each  Bravo rows with scap retraction 5\" hold, x12 (5#)  Bravo shoulder ext 12x2 (2.5#)  Bravo ER/IR 12x each (2.5#)  Standing shoulder flex, scap, abd x 15 each (1# DBs)  Standing punches x 15 B (1# DBs)  S/L shoulder ER 12x2 (2#)  S/L shoulder abd 12x2 (2#)  Supine serratus punches 12x2 (2#)  PROM R shoulder  GRx10', R shoulder, low comp     Access Code: [email or web address]     Charges: TE x3, Vaso    Assessment: Patient with muscle soreness but no sharp pain with strengthening progression today. Advised to make sure she is stretching everyday after her strength training.    Plan: Continue to increase shoulder flexibility and strength per MD protocol.     Penelope Ojeda, PT  "

## 2024-03-18 NOTE — PROGRESS NOTES
"Physical Therapy  Physical Therapy Treatment    Patient Name: Jen Franks  MRN: [id number]  Today's Date: 3/19/2024  Time Calculation  Start Time: 1610  Stop Time: 1700  Time Calculation (min): 50 min    Insurance:  Visit number: 15 of 60  Authorization info: no auth needed  Insurance Type: Cigna     General:  Reason for visit: Right shoulder pain s/p SLAP repair 1/11/24  Referred by: Dr. Monae       Current Problem  1. Acute pain of right shoulder  Follow Up In Physical Therapy          Pain Assessment: 0-10  Pain Score: 0 - No pain    Subjective:   Patient reports she has been doing fine with the 2# weights she got to use at home.    HEP Performed:  Yes    Objective:   Right shoulder AROM flexion : 120 deg, abduction: 100 deg    Treatments:   UBE, seat 5, L5, F/R 3' each   Pulleys flex, scap 3' each  Bravo rows with scap retraction 5\" hold, 15x2 (5#)  Bravo shoulder ext 15x2 (5#)  Bravo ER/IR 15x each (2.5#)  Standing shoulder flex, scap, abd 15x2 each (2# DBs)  Standing punches 15x2 (2# DBs)  S/L shoulder ER 15x2 (2#)  S/L shoulder abd 15x2 (2#)  Supine serratus punches 12x2 (2#)  PROM R shoulder  GRx10', R shoulder, low comp     Access Code: [email or web address]     Charges: TE x3, Vaso    Assessment: Patient able to increase intensity on strengthening exercises today. Advised to keep stretching into resistance to get end-range flexion.    Plan: Continue to increase shoulder flexibility and strength per MD protocol.     Penelope Ojeda, PT  "

## 2024-03-19 ENCOUNTER — TREATMENT (OUTPATIENT)
Dept: PHYSICAL THERAPY | Facility: CLINIC | Age: 39
End: 2024-03-19
Payer: COMMERCIAL

## 2024-03-19 DIAGNOSIS — M25.511 ACUTE PAIN OF RIGHT SHOULDER: ICD-10-CM

## 2024-03-19 PROCEDURE — 97016 VASOPNEUMATIC DEVICE THERAPY: CPT | Mod: GP

## 2024-03-19 PROCEDURE — 97110 THERAPEUTIC EXERCISES: CPT | Mod: GP

## 2024-03-19 ASSESSMENT — PAIN SCALES - GENERAL: PAINLEVEL_OUTOF10: 0 - NO PAIN

## 2024-03-19 ASSESSMENT — PAIN - FUNCTIONAL ASSESSMENT: PAIN_FUNCTIONAL_ASSESSMENT: 0-10

## 2024-03-21 NOTE — PROGRESS NOTES
"Physical Therapy  Physical Therapy Treatment    Patient Name: Jen Franks  MRN: [id number]  Today's Date: 3/22/2024  Time Calculation  Start Time: 1330  Stop Time: 1420  Time Calculation (min): 50 min    Insurance:  Visit number: 16 of 60  Authorization info: no auth needed  Insurance Type: Cigna     General:  Reason for visit: Right shoulder pain s/p SLAP repair 1/11/24  Referred by: Dr. Monae       Current Problem  1. Acute pain of right shoulder  Follow Up In Physical Therapy          Pain Assessment: 0-10  Pain Score: 0 - No pain    Subjective:   Patient reports she is still having trouble staying asleep through the night.     HEP Performed:  Yes    Objective:   Right shoulder AROM flexion : 125 deg, abduction: 105 deg    Treatments:   UBE, seat 5, L5, F/R 3' each   Pulleys flex, scap 3' each  Bravo rows with scap retraction 5\" hold, 15x2 (5#)  Bravo shoulder ext 15x2 (5#)  Bravo ER/IR 15x2 each (2.5#)  Standing shoulder flex, scap, abd 15x2 each (2# DBs)  Standing punches 15x2 (2# DBs)  S/L shoulder ER 15x2 (2#)  S/L shoulder abd 15x2 (2#)  Supine serratus punches 12x2 (2#)  Figure 8s 15x2 (2#)  PROM R shoulder  GRx10', R shoulder, low comp     Access Code: [email or web address]     Charges: TE x3, Vaso    Assessment: Patient is building endurance in shoulder. Still feels pain at end-range.    Plan: Continue to increase shoulder flexibility and strength per MD protocol.     Penelope Ojeda, PT  "

## 2024-03-22 ENCOUNTER — TREATMENT (OUTPATIENT)
Dept: PHYSICAL THERAPY | Facility: CLINIC | Age: 39
End: 2024-03-22
Payer: COMMERCIAL

## 2024-03-22 DIAGNOSIS — M25.511 ACUTE PAIN OF RIGHT SHOULDER: ICD-10-CM

## 2024-03-22 PROCEDURE — 97110 THERAPEUTIC EXERCISES: CPT | Mod: GP

## 2024-03-22 PROCEDURE — 97016 VASOPNEUMATIC DEVICE THERAPY: CPT | Mod: GP

## 2024-03-22 ASSESSMENT — PAIN - FUNCTIONAL ASSESSMENT: PAIN_FUNCTIONAL_ASSESSMENT: 0-10

## 2024-03-22 ASSESSMENT — PAIN SCALES - GENERAL: PAINLEVEL_OUTOF10: 0 - NO PAIN

## 2024-03-25 NOTE — PROGRESS NOTES
"Physical Therapy  Physical Therapy Treatment    Patient Name: Jen Franks  MRN: [id number]  Today's Date: 3/26/2024  Time Calculation  Start Time: 1530  Stop Time: 1620  Time Calculation (min): 50 min    Insurance:  Visit number: 17 of 60  Authorization info: no auth needed  Insurance Type: Cigna     General:  Reason for visit: Right shoulder pain s/p SLAP repair 1/11/24  Referred by: Dr. Monae       Current Problem  1. Acute pain of right shoulder            Pain Assessment: 0-10  Pain Score: 0 - No pain    Subjective:   Patient reports she is still having trouble staying asleep through the night.     HEP Performed:  Yes    Objective:   Right shoulder AROM flexion : 125 deg, abduction: 105 deg    Treatments:   UBE, seat 5, L5, F/R 3' each   Pulleys flex, scap 3' each  Bravo rows with scap retraction 5\" hold, 15x2 (7.5#)  Bravo shoulder ext 15x2 (7.5#)  Bravo ER/IR 15x2 each (5#)  Standing shoulder flex, scap, abd 15x2 each (3# DBs)  Standing punches 15x2 (3# DBs)  S/L shoulder ER 15x2 (3#)  S/L shoulder abd 15x2 (3#)  Supine serratus punches 15x2 (3#)  Figure 8s 15x2 (3#)  Ball roll up wall flexion stretch 5\" hold x 10   GRx10', R shoulder, low comp     Access Code: [email or web address]     Charges: TE x3, Vaso    Assessment: Patient able to increase weight on all exercises today.    Plan: Continue to increase shoulder flexibility and strength per MD protocol.     Penelope Ojeda, PT  "

## 2024-03-26 ENCOUNTER — TREATMENT (OUTPATIENT)
Dept: PHYSICAL THERAPY | Facility: CLINIC | Age: 39
End: 2024-03-26
Payer: COMMERCIAL

## 2024-03-26 DIAGNOSIS — M25.511 ACUTE PAIN OF RIGHT SHOULDER: Primary | ICD-10-CM

## 2024-03-26 PROCEDURE — 97016 VASOPNEUMATIC DEVICE THERAPY: CPT | Mod: GP

## 2024-03-26 PROCEDURE — 97110 THERAPEUTIC EXERCISES: CPT | Mod: GP

## 2024-03-26 ASSESSMENT — PAIN - FUNCTIONAL ASSESSMENT: PAIN_FUNCTIONAL_ASSESSMENT: 0-10

## 2024-03-26 ASSESSMENT — PAIN SCALES - GENERAL: PAINLEVEL_OUTOF10: 0 - NO PAIN

## 2024-03-27 NOTE — PROGRESS NOTES
"Physical Therapy  Physical Therapy Treatment    Patient Name: Jen Franks  MRN: [id number]  Today's Date: 3/28/2024  Time Calculation  Start Time: 1530  Stop Time: 1620  Time Calculation (min): 50 min    Insurance:  Visit number: 18 of 60  Authorization info: no auth needed  Insurance Type: Cigna     General:  Reason for visit: Right shoulder pain s/p SLAP repair 1/11/24  Referred by: Dr. Monae       Current Problem  1. Acute pain of right shoulder            Pain Assessment: 0-10  Pain Score: 1    Subjective:   Patient reports not too sore after we increased weights last visit.    HEP Performed:  Yes    Objective:   Right shoulder AROM flexion : 125 deg, abduction: 105 deg    Treatments:   UBE, seat 5, L5, F/R 3' each   Pulleys flex, scap 3' each  Bravo rows with scap retraction 5\" hold, 15x2 (7.5#)  Bravo shoulder ext 15x2 (7.5#)  Bravo ER/IR 15x2 each (5#)  Standing shoulder flex, scap, abd 15x each (3# DBs)  Standing punches 15x2 (3# DBs)  Forearm planks 15\" x 4- HEP  Wall pushups 10x2- HEP  Ball roll up wall flexion stretch 5\" hold x 10   GRx10', R shoulder, low comp     Access Code: [email or web address]     Charges: TE x3, Vaso    Assessment: Patient with muscle fatigue but no pain during addition of wall push ups and forearm planks.    Plan: Continue to increase shoulder flexibility and strength per MD protocol.     Penelope Ojeda, PT  "

## 2024-03-28 ENCOUNTER — TREATMENT (OUTPATIENT)
Dept: PHYSICAL THERAPY | Facility: CLINIC | Age: 39
End: 2024-03-28
Payer: COMMERCIAL

## 2024-03-28 DIAGNOSIS — M25.511 ACUTE PAIN OF RIGHT SHOULDER: Primary | ICD-10-CM

## 2024-03-28 PROCEDURE — 97110 THERAPEUTIC EXERCISES: CPT | Mod: GP

## 2024-03-28 PROCEDURE — 97016 VASOPNEUMATIC DEVICE THERAPY: CPT | Mod: GP

## 2024-03-28 ASSESSMENT — PAIN - FUNCTIONAL ASSESSMENT: PAIN_FUNCTIONAL_ASSESSMENT: 0-10

## 2024-03-28 ASSESSMENT — PAIN SCALES - GENERAL: PAINLEVEL_OUTOF10: 1

## 2024-04-01 NOTE — PROGRESS NOTES
"Physical Therapy  Physical Therapy Treatment    Patient Name: Jen Franks  MRN: [id number]  Today's Date: 4/2/2024  Time Calculation  Start Time: 1530  Stop Time: 1620  Time Calculation (min): 50 min    Insurance:  Visit number: 19 of 60  Authorization info: no auth needed  Insurance Type: Cigna     General:  Reason for visit: Right shoulder pain s/p SLAP repair 1/11/24  Referred by: Dr. Monae       Current Problem  1. Acute pain of right shoulder            Pain Assessment: 0-10  Pain Score: 1    Subjective:   Patient reports not too sore after we increased weights last visit.    HEP Performed:  Yes    Objective:   Right shoulder AROM flexion : 130 deg, abduction: 110 deg    Treatments:   UBE, seat 5, L6, F/R 3' each   Incline pushups 10x2- HEP  Standing shoulder flex, scap, abd 15x each (4# DBs)  Forearm planks 20\" x 4   SB rollout to plank 5\" hold x 10  Standing punches 15x2 (4# DBs)   YTB star excursions x 10- HEP  Ball roll up wall flexion stretch 10\" hold x 10   Finger walk up door frame 10\" x 10  Pulley IR stretch  15\" x 10  GRx10', R shoulder, low comp     Access Code: [email or web address]     Charges: TE x3, Vaso    Assessment: Patient tolerated increased intensity with shoulder stability exercises so added to HEP.    Plan: Continue to increase shoulder flexibility and strength per MD protocol.     Penelope Ojeda, PT  "

## 2024-04-02 ENCOUNTER — TREATMENT (OUTPATIENT)
Dept: PHYSICAL THERAPY | Facility: CLINIC | Age: 39
End: 2024-04-02
Payer: COMMERCIAL

## 2024-04-02 DIAGNOSIS — M25.511 ACUTE PAIN OF RIGHT SHOULDER: Primary | ICD-10-CM

## 2024-04-02 PROCEDURE — 97110 THERAPEUTIC EXERCISES: CPT | Mod: GP

## 2024-04-02 PROCEDURE — 97016 VASOPNEUMATIC DEVICE THERAPY: CPT | Mod: GP

## 2024-04-02 ASSESSMENT — PAIN SCALES - GENERAL: PAINLEVEL_OUTOF10: 1

## 2024-04-02 ASSESSMENT — PAIN - FUNCTIONAL ASSESSMENT: PAIN_FUNCTIONAL_ASSESSMENT: 0-10

## 2024-04-04 ENCOUNTER — TREATMENT (OUTPATIENT)
Dept: PHYSICAL THERAPY | Facility: CLINIC | Age: 39
End: 2024-04-04
Payer: COMMERCIAL

## 2024-04-04 DIAGNOSIS — M25.511 ACUTE PAIN OF RIGHT SHOULDER: ICD-10-CM

## 2024-04-04 PROCEDURE — 97110 THERAPEUTIC EXERCISES: CPT | Mod: GP

## 2024-04-04 PROCEDURE — 97016 VASOPNEUMATIC DEVICE THERAPY: CPT | Mod: GP

## 2024-04-04 ASSESSMENT — PAIN - FUNCTIONAL ASSESSMENT: PAIN_FUNCTIONAL_ASSESSMENT: 0-10

## 2024-04-04 ASSESSMENT — PAIN SCALES - GENERAL: PAINLEVEL_OUTOF10: 0 - NO PAIN

## 2024-04-04 NOTE — PROGRESS NOTES
Physical Therapy  Physical Therapy Orthopedic Progress Note    Patient Name: Jen Franks  MRN: [id number]  Today's Date: 4/4/2024  Time Calculation  Start Time: 1545  Stop Time: 1635  Time Calculation (min): 50 min    Insurance:  Visit number: 20 of 60  Authorization info: no auth needed  Insurance Type: Cigna     General:  Reason for visit: Right shoulder pain s/p SLAP repair 1/11/24  Referred by: Dr. Monae    Current Problem  1. Acute pain of right shoulder  Follow Up In Physical Therapy          Subjective:  Patient reports that her sleeping was a little better last night. Feels like she is getting more motion with her stretching.    Current Condition:   Better    Pain:  Pain Assessment: 0-10  Pain Score: 0 - No pain    Self Reported Function (0-100%) = 80%  (100% being back to PLOF)    Objective:  Posture: FHP, rounded shoulders     Palpation: denies TTP     Flexibility: min tight R pec and bicep        ROM                Shoulder AROM (Degrees)                             (R)                    (L)  Flexion:            120                   170                                             Abduction:       115                     170                                 Functional ER:  C7                      C7                                  Functional IR:   T8                       T4                                                                          Elbow AROM (Degrees)                             (R)                                            (L)  Flexion:            WFL                                         WFL                                            Extension:        WFL                                         WFL                                                              Strength Testing    UE strength MMT  R L  Shoulder flexion  4-/5 5/5  Shoulder abduction             4-/5 5/5  Shoulder ER               4-/5 4+/5  Shoulder IR               4/5 5/5  Elbow flexion                "4+/5 5/5  Elbow extension   4/5 5/5                              Special Tests     Radicular Symptoms: none      Outcome Measures: Updated 4/4/2024  Other Measures  Other Outcome Measures: 20.45 (QuickDASH)     EDUCATION: home exercise program, plan of care, activity modifications, pain management, and injury pathology       Goals: Updated 4/4/2024  Active       PT Problem       STG (Met)       Start:  01/18/24    Expected End:  04/17/24    Resolved:  04/04/24    Patient will decrease pain to 0-2/10 in 4 weeks.   Patient will increase strength by >/= 1/2 mm grade in 4 weeks.   QuickDASH: -8 in 4 weeks.               LTG (Progressing)       Start:  01/18/24    Expected End:  04/17/24       Patient will increase UE flexibility by 10 degrees in 8 weeks.   Patient will be able to lift > 5 lbs without increased pain in 8 weeks.  Patient will be able to reach into cupboard without increased pain in 8 weeks.              Plan of care was developed with input and agreement by the patient      Treatment Performed:  UBE, seat 5, L6, F/R 3' each   Recheck completed   Incline pushups 15x, 10x  Standing shoulder flex, scap, abd 15x each (4# DBs)  Standing punches 15x2 (4# DBs)   Forearm planks 20\" x 4   SB rollout to plank 10\" hold x 10  YTB star excursions x 10  Blue TB horizontal abd, 15x2  Ball roll up wall flexion stretch 10\" hold x 10   Finger walk up door frame into flexion 10\" x 10  Side of hand walk up door frame into abduction 10\" x 10  Pulley IR stretch  15\" x 10  GRx10', R shoulder, low comp     Access Code: [email or web address]     Charges: TE x3, Vaso    Assessment: Patient continues to demonstrate improvement in UE AROM and strength. Will continue to progress end-range motion and strengthening to tolerance.       Plan: Updated 4/4/2024     Planned Interventions include: therapeutic exercise, self-care home management, manual therapy, therapeutic activities, gait training, neuromuscular coordination, vasopneumatic, dry " needling, aquatic therapy  Frequency: 1-2 x Week  Duration: 6 Weeks    Penelope Ojeda, PT

## 2024-04-08 NOTE — PROGRESS NOTES
"Physical Therapy  Physical Therapy Treatment    Patient Name: Jen Franks  MRN: [id number]  Today's Date: 4/9/2024  Time Calculation  Start Time: 1545  Stop Time: 1635  Time Calculation (min): 50 min    Insurance:  Visit number: 21 of 60  Authorization info: no auth needed  Insurance Type: Cigna     General:  Reason for visit: Right shoulder pain s/p SLAP repair 1/11/24  Referred by: Dr. Monae       Current Problem  1. Acute pain of right shoulder  Follow Up In Physical Therapy          Pain Assessment: 0-10  Pain Score: 2    Subjective:   Patient reports that she is still having trouble sleeping, but no significant soreness after last session.    HEP Performed:  Yes    Objective:   Right shoulder AROM flexion : 135 deg, abduction: 115 deg    Treatments:   UBE, seat 5, L6, F/R 3' each   SB rollout to plank 10\" hold x 10  Standing shoulder flex, scap, abd 15x each (4# DBs)  Standing punches 15x2 (4# DBs)    Bravo rows with scap retraction 7.5#, 15x2  Finger walk up door frame into flexion 10\" x 10   Side of hand walk up door frame into abduction 10\" x 10  Incline pushups 15x, 10x  Forearm planks 20\" x 4   RTB star excursions x 10  RTB wall inchworms 20 ft x 4  PROM R shoulder all directions  Pulley IR stretch  15\" x 10  GRx10', R shoulder, low comp     Access Code: [email or web address]     Charges: TE x3, Vaso    Assessment: Patient continues to work on end range motion but has been able to increase intensity with strengthening.    Plan: Continue to increase shoulder flexibility and strength per MD protocol.     Penelope Ojeda, PT  "

## 2024-04-09 ENCOUNTER — TREATMENT (OUTPATIENT)
Dept: PHYSICAL THERAPY | Facility: CLINIC | Age: 39
End: 2024-04-09
Payer: COMMERCIAL

## 2024-04-09 DIAGNOSIS — M25.511 ACUTE PAIN OF RIGHT SHOULDER: ICD-10-CM

## 2024-04-09 PROCEDURE — 97016 VASOPNEUMATIC DEVICE THERAPY: CPT | Mod: GP

## 2024-04-09 PROCEDURE — 97110 THERAPEUTIC EXERCISES: CPT | Mod: GP

## 2024-04-09 ASSESSMENT — PAIN - FUNCTIONAL ASSESSMENT: PAIN_FUNCTIONAL_ASSESSMENT: 0-10

## 2024-04-09 ASSESSMENT — PAIN SCALES - GENERAL: PAINLEVEL_OUTOF10: 2

## 2024-04-10 ENCOUNTER — OFFICE VISIT (OUTPATIENT)
Dept: ORTHOPEDIC SURGERY | Facility: HOSPITAL | Age: 39
End: 2024-04-10
Payer: COMMERCIAL

## 2024-04-10 DIAGNOSIS — S43.431D SUPERIOR GLENOID LABRUM LESION OF RIGHT SHOULDER, SUBSEQUENT ENCOUNTER: Primary | ICD-10-CM

## 2024-04-10 PROCEDURE — 99212 OFFICE O/P EST SF 10 MIN: CPT | Performed by: ORTHOPAEDIC SURGERY

## 2024-04-10 PROCEDURE — 99024 POSTOP FOLLOW-UP VISIT: CPT | Performed by: ORTHOPAEDIC SURGERY

## 2024-04-10 NOTE — PROGRESS NOTES
"Physical Therapy  Physical Therapy Treatment    Patient Name: Jen Franks  MRN: [id number]  Today's Date: 4/11/2024  Time Calculation  Start Time: 1520  Stop Time: 1610  Time Calculation (min): 50 min    Insurance:  Visit number: 22 of 60  Authorization info: no auth needed  Insurance Type: Cigna     General:  Reason for visit: Right shoulder pain s/p SLAP repair 1/11/24  Referred by: Dr. Monae       Current Problem  1. Acute pain of right shoulder  Follow Up In Physical Therapy          Pain Assessment: 0-10  Pain Score: 1    Subjective:   Patient reports that she saw MD who told her she can return to [facility]. Slept through the night last night.    HEP Performed:  Yes    Objective:   Right shoulder AROM flexion : 135 deg, abduction: 115 deg    Treatments:   UBE, seat 5, L6, F/R 3' each   Push ups from knees x 10  Bravo diagonals 4 directions x 10 each  SB rollout to plank 10\" hold x 10  Bravo rows with scap retraction 7.5#, 15x2  Bravo ER/IR 5#, 15x2 each   Standing shoulder flex, scap, abd 15x each (4# DBs)  Standing punches 15x2 (4# DBs)    Finger walk up door frame into flexion 10\" x 10   Side of hand walk up door frame into abduction 10\" x 10  Pulley IR stretch  15\" x 10  GRx10', R shoulder, low comp     Access Code: [email or web address]     Charges: TE x3, Vaso    Assessment: Patient challenged by diagonal resisted exercises today.    Plan: Continue to increase shoulder flexibility and strength per MD protocol.     Penelope Ojeda, PT  "

## 2024-04-10 NOTE — PROGRESS NOTES
Month status post repair of labrum right shoulder she is doing generally quite well.  She still has some restrictions of motion and she is still in therapy right shoulder elevation 170 external rotation and abduction 80 internal rotation and abduction 50, slight pain at the extremes of range of motion.  Motor power in abduction 5/5.    Right shoulder labral tear    Patient is doing very well 3 months postoperatively.  She may return to [employer] but should control her activities and do moderate upper extremity activity only for another month.  Follow-up will be in 1 month as needed.    This was dictated using voice recognition software and not corrected for grammatical or spelling errors.

## 2024-04-11 ENCOUNTER — TREATMENT (OUTPATIENT)
Dept: PHYSICAL THERAPY | Facility: CLINIC | Age: 39
End: 2024-04-11
Payer: COMMERCIAL

## 2024-04-11 DIAGNOSIS — M25.511 ACUTE PAIN OF RIGHT SHOULDER: ICD-10-CM

## 2024-04-11 PROCEDURE — 97110 THERAPEUTIC EXERCISES: CPT | Mod: GP

## 2024-04-11 PROCEDURE — 97016 VASOPNEUMATIC DEVICE THERAPY: CPT | Mod: GP

## 2024-04-11 ASSESSMENT — PAIN - FUNCTIONAL ASSESSMENT: PAIN_FUNCTIONAL_ASSESSMENT: 0-10

## 2024-04-11 ASSESSMENT — PAIN SCALES - GENERAL: PAINLEVEL_OUTOF10: 1

## 2024-04-15 NOTE — PROGRESS NOTES
Physical Therapy  Physical Therapy Treatment    Patient Name: Jen Franks  MRN: [id number]  Today's Date: 4/16/2024  Time Calculation  Start Time: 1500  Stop Time: 1540  Time Calculation (min): 40 min    Insurance:  Visit number: 23 of 60  Authorization info: no auth needed  Insurance Type: Cigna     General:  Reason for visit: Right shoulder pain s/p SLAP repair 1/11/24  Referred by: Dr. Monae       Current Problem  1. Acute pain of right shoulder  Follow Up In Physical Therapy          Pain Assessment: 0-10  Pain Score: 6    Subjective:   Patient reports that she went back to [place] on Friday and now her shoulder is really bothering her. Made appt with Dov for Monday to assess. Has been feeling pinching at end-range.    HEP Performed:  Yes    Objective:   Right shoulder AROM flexion : 120 deg, abduction: 105 deg    Treatments:   UBE, seat 5, L6, F/R 3' each   PROM R shoulder all directions  STM to R shoulder  Supine wand flex, ER x 12 each  Standing wand abd x 12  GRx10', R shoulder, low comp     Access Code: [email or web address]     Charges: TE x2, Vaso    Assessment: Patient advised to avoid pushing into pain during stretches. Will trial anti-inflammatories and ice until she sees MD. Did lose some AROM today due to pain. Showing sign of impingement.    Plan: Continue to increase shoulder flexibility and strength per MD protocol.     Penelope Ojeda, PT

## 2024-04-16 ENCOUNTER — TREATMENT (OUTPATIENT)
Dept: PHYSICAL THERAPY | Facility: CLINIC | Age: 39
End: 2024-04-16
Payer: COMMERCIAL

## 2024-04-16 DIAGNOSIS — M25.511 ACUTE PAIN OF RIGHT SHOULDER: ICD-10-CM

## 2024-04-16 PROCEDURE — 97110 THERAPEUTIC EXERCISES: CPT | Mod: GP

## 2024-04-16 PROCEDURE — 97016 VASOPNEUMATIC DEVICE THERAPY: CPT | Mod: GP

## 2024-04-16 ASSESSMENT — PAIN SCALES - GENERAL: PAINLEVEL_OUTOF10: 6

## 2024-04-16 ASSESSMENT — PAIN - FUNCTIONAL ASSESSMENT: PAIN_FUNCTIONAL_ASSESSMENT: 0-10

## 2024-04-22 ENCOUNTER — OFFICE VISIT (OUTPATIENT)
Dept: ORTHOPEDIC SURGERY | Facility: HOSPITAL | Age: 39
End: 2024-04-22
Payer: COMMERCIAL

## 2024-04-22 VITALS — WEIGHT: 145 LBS | BODY MASS INDEX: 25.69 KG/M2 | HEIGHT: 63 IN

## 2024-04-22 DIAGNOSIS — S43.431D SUPERIOR GLENOID LABRUM LESION OF RIGHT SHOULDER, SUBSEQUENT ENCOUNTER: ICD-10-CM

## 2024-04-22 PROCEDURE — 99212 OFFICE O/P EST SF 10 MIN: CPT | Performed by: ORTHOPAEDIC SURGERY

## 2024-04-22 PROCEDURE — 1036F TOBACCO NON-USER: CPT | Performed by: ORTHOPAEDIC SURGERY

## 2024-04-22 RX ORDER — DICLOFENAC SODIUM 75 MG/1
75 TABLET, DELAYED RELEASE ORAL 2 TIMES DAILY
Qty: 20 TABLET | Refills: 0 | Status: SHIPPED | OUTPATIENT
Start: 2024-04-22 | End: 2025-04-22

## 2024-04-22 NOTE — PROGRESS NOTES
Patient is here for reevaluation of right shoulder she was working out doing heavy exercise with aerobics and doing squats holding a heavy bag.  She feels like it hurt her shoulder.  Is quite painful anteriorly and laterally.  She has been using ibuprofen which does not help.    On exam today patient has nearly full range of motion with pain on [place]'s testing.  Motor power in abduction is 5/5 with slight pain as well.  No apprehension.    Right shoulder pain    Patient is right current right shoulder pain after surgery.  Recommended a course of anti-inflammatory medicine and rest and follow-up call in about 10 days.  If the patient has persistent pain and recommend repeat imaging.    This was dictated using voice recognition software and not corrected for grammatical or spelling errors.

## 2024-04-22 NOTE — PROGRESS NOTES
"Physical Therapy  Physical Therapy Treatment    Patient Name: Jen Franks  MRN: [id number]  Today's Date: 4/23/2024  Time Calculation  Start Time: 1615  Stop Time: 1700  Time Calculation (min): 45 min    Insurance:  Visit number: 24 of 60  Authorization info: no auth needed  Insurance Type: Cigna     General:  Reason for visit: Right shoulder pain s/p SLAP repair 1/11/24  Referred by: Dr. Monae       Current Problem  1. Acute pain of right shoulder  Follow Up In Physical Therapy          Pain Assessment: 0-10  Pain Score: 6    Subjective:   Patient reports that she saw MD who put her on antiinflammatory and told her to take it easy for a week.     HEP Performed:  Yes    Objective:   Right shoulder AROM flexion : 120 deg, abduction: 105 deg    Treatments:   UBE, seat 5, L6, F/R 3' each   PROM R shoulder all directions  STM to R shoulder  Standing wand flex, abd, IR up back, ext x 15  Towel on wall flexion 5\" hold x 10  Towel on wall circles cw/ccw x 15   GRx10', R shoulder, low comp     Access Code: [email or web address]     Charges: TE x2, Vaso    Assessment: Patient advised to continue light stretching not pushing into pain.    Plan: Continue to increase shoulder flexibility and strength per MD protocol.     Penelope Ojeda, PT  "

## 2024-04-23 ENCOUNTER — TELEPHONE (OUTPATIENT)
Dept: ORTHOPEDIC SURGERY | Facility: HOSPITAL | Age: 39
End: 2024-04-23

## 2024-04-23 ENCOUNTER — TREATMENT (OUTPATIENT)
Dept: PHYSICAL THERAPY | Facility: CLINIC | Age: 39
End: 2024-04-23
Payer: COMMERCIAL

## 2024-04-23 DIAGNOSIS — M25.511 ACUTE PAIN OF RIGHT SHOULDER: ICD-10-CM

## 2024-04-23 PROCEDURE — 97016 VASOPNEUMATIC DEVICE THERAPY: CPT | Mod: GP

## 2024-04-23 PROCEDURE — 97110 THERAPEUTIC EXERCISES: CPT | Mod: GP

## 2024-04-23 ASSESSMENT — PAIN SCALES - GENERAL: PAINLEVEL_OUTOF10: 6

## 2024-04-23 ASSESSMENT — PAIN - FUNCTIONAL ASSESSMENT: PAIN_FUNCTIONAL_ASSESSMENT: 0-10

## 2024-04-23 NOTE — TELEPHONE ENCOUNTER
Patient called and left message, she states medication was not sent to her pharmacy, she would like sent to [facility] [address]  Pharmacy updated in chart.  I called patient, Voltaren was sent to [facility] which is a mail order, she will wait for mail order to arrive and will correct pharamcy for next visit.

## 2024-04-30 ENCOUNTER — TREATMENT (OUTPATIENT)
Dept: PHYSICAL THERAPY | Facility: CLINIC | Age: 39
End: 2024-04-30
Payer: COMMERCIAL

## 2024-04-30 DIAGNOSIS — M25.511 ACUTE PAIN OF RIGHT SHOULDER: Primary | ICD-10-CM

## 2024-04-30 PROCEDURE — 97110 THERAPEUTIC EXERCISES: CPT | Mod: GP

## 2024-04-30 PROCEDURE — 97016 VASOPNEUMATIC DEVICE THERAPY: CPT | Mod: GP

## 2024-04-30 ASSESSMENT — PAIN - FUNCTIONAL ASSESSMENT: PAIN_FUNCTIONAL_ASSESSMENT: 0-10

## 2024-04-30 ASSESSMENT — PAIN SCALES - GENERAL: PAINLEVEL_OUTOF10: 4

## 2024-04-30 NOTE — PROGRESS NOTES
"Physical Therapy  Physical Therapy Treatment    Patient Name: Jen Franks  MRN: [id number]  Today's Date: 4/30/2024  Time Calculation  Start Time: 1615  Stop Time: 1700  Time Calculation (min): 45 min    Insurance:  Visit number: 25 of 60  Authorization info: no auth needed  Insurance Type: Cigna     General:  Reason for visit: Right shoulder pain s/p SLAP repair 1/11/24  Referred by: Dr. Monae       Current Problem  1. Acute pain of right shoulder  Follow Up In Physical Therapy          Pain Assessment: 0-10  Pain Score: 4    Subjective:   Patient reports that her pain is down a little from last week but not gone. Is reaching out to MD barberrw to discuss getting updated imaging.     HEP Performed:  Yes    Objective:   Right shoulder AROM flexion : 120 deg, abduction: 110 deg    Treatments:   UBE, seat 5, L6, F/R 3' each   PROM R shoulder all directions  STM to R shoulder  Standing wand flex, abd, IR up back, ext x 15  Towel on wall flexion 5\" hold x 10  Towel on wall circles cw/ccw x 15   GRx10', R shoulder, low comp     Access Code: [email or web address]     Charges: TE x2, Vaso    Assessment: Patient able to tolerate stretching today with pain at end-range.    Plan: Continue to increase shoulder flexibility and strength per MD protocol. Patient will reach out after she sees MD.    Penelope Ojeda, PT  "

## 2024-05-01 ENCOUNTER — TELEPHONE (OUTPATIENT)
Dept: ORTHOPEDIC SURGERY | Facility: HOSPITAL | Age: 39
End: 2024-05-01
Payer: COMMERCIAL

## 2024-05-01 DIAGNOSIS — S43.431D SUPERIOR GLENOID LABRUM LESION OF RIGHT SHOULDER, SUBSEQUENT ENCOUNTER: ICD-10-CM

## 2024-05-01 NOTE — TELEPHONE ENCOUNTER
Patient called to report that she has completed the meloxicam x1 week and continues to have pain right shoulder.  She would like to proceed with MRI.   Would you like MRI Arthrogram Right Shoulder?  Please advise.

## 2024-05-03 NOTE — TELEPHONE ENCOUNTER
MRI Arthrogram RIGHT SHOULDER ordered after review with Dr Monae.    MRI Arthrogram to be done at  to compare with previous study.  Patient informed

## 2024-05-08 ENCOUNTER — LAB REQUISITION (OUTPATIENT)
Dept: LAB | Facility: HOSPITAL | Age: 39
End: 2024-05-08
Payer: COMMERCIAL

## 2024-05-08 DIAGNOSIS — L30.9 DERMATITIS, UNSPECIFIED: ICD-10-CM

## 2024-05-08 PROCEDURE — 87070 CULTURE OTHR SPECIMN AEROBIC: CPT

## 2024-05-08 PROCEDURE — 87205 SMEAR GRAM STAIN: CPT

## 2024-05-08 PROCEDURE — 87075 CULTR BACTERIA EXCEPT BLOOD: CPT

## 2024-05-10 LAB
BACTERIA SPEC CULT: ABNORMAL
GRAM STN SPEC: ABNORMAL
GRAM STN SPEC: ABNORMAL

## 2024-05-17 ENCOUNTER — TELEPHONE (OUTPATIENT)
Dept: ORTHOPEDIC SURGERY | Facility: HOSPITAL | Age: 39
End: 2024-05-17
Payer: COMMERCIAL

## 2024-05-17 NOTE — TELEPHONE ENCOUNTER
Jen called to report that that  was denied for the location for the MRI.  She is asking for appeal for location as there was issue previously with outside MRI quality.

## 2024-05-20 NOTE — TELEPHONE ENCOUNTER
I called [facility], spoke with Miranda PIZARRO nurse reviewer, had the location approved for  for MRI Arthrogram   Auth # [id number]   5/14/24-11/10/24.

## 2024-06-03 ENCOUNTER — OFFICE VISIT (OUTPATIENT)
Dept: OBSTETRICS AND GYNECOLOGY | Facility: CLINIC | Age: 39
End: 2024-06-03
Payer: COMMERCIAL

## 2024-06-03 VITALS
HEIGHT: 64 IN | DIASTOLIC BLOOD PRESSURE: 70 MMHG | BODY MASS INDEX: 28.17 KG/M2 | SYSTOLIC BLOOD PRESSURE: 120 MMHG | WEIGHT: 165 LBS

## 2024-06-03 DIAGNOSIS — R10.2 PELVIC PAIN: Primary | ICD-10-CM

## 2024-06-03 PROCEDURE — 99213 OFFICE O/P EST LOW 20 MIN: CPT | Performed by: OBSTETRICS & GYNECOLOGY

## 2024-06-03 RX ORDER — ATOMOXETINE 18 MG/1
CAPSULE ORAL
COMMUNITY
Start: 2024-03-25 | End: 2024-06-03

## 2024-06-03 RX ORDER — ATOMOXETINE 60 MG/1
CAPSULE ORAL
COMMUNITY
Start: 2024-05-30

## 2024-06-03 RX ORDER — AZELASTINE 1 MG/ML
SPRAY, METERED NASAL
COMMUNITY
Start: 2022-11-08 | End: 2024-06-03

## 2024-06-03 RX ORDER — ATOMOXETINE 25 MG/1
CAPSULE ORAL
COMMUNITY
Start: 2024-04-11 | End: 2024-06-03

## 2024-06-03 RX ORDER — LEVONORGESTREL 52 MG/1
INTRAUTERINE DEVICE INTRAUTERINE
COMMUNITY

## 2024-06-03 RX ORDER — ATOMOXETINE 40 MG/1
CAPSULE ORAL
COMMUNITY
Start: 2024-05-06 | End: 2024-06-03

## 2024-06-03 RX ORDER — OMEPRAZOLE 20 MG/1
CAPSULE, DELAYED RELEASE ORAL
COMMUNITY
Start: 2024-04-12

## 2024-06-03 RX ORDER — ALPRAZOLAM 0.25 MG/1
TABLET ORAL
COMMUNITY
Start: 2024-05-30

## 2024-06-03 RX ORDER — TRIAMCINOLONE ACETONIDE 1 MG/G
CREAM TOPICAL
COMMUNITY
Start: 2024-05-09 | End: 2024-06-03

## 2024-06-03 RX ORDER — CICLOPIROX OLAMINE 7.7 MG/G
CREAM TOPICAL
COMMUNITY
Start: 2024-05-09 | End: 2024-06-03

## 2024-06-03 ASSESSMENT — ENCOUNTER SYMPTOMS
NEUROLOGICAL NEGATIVE: 0
HEMATOLOGIC/LYMPHATIC NEGATIVE: 0
ALLERGIC/IMMUNOLOGIC NEGATIVE: 0
PSYCHIATRIC NEGATIVE: 0
MUSCULOSKELETAL NEGATIVE: 0
RESPIRATORY NEGATIVE: 0
EYES NEGATIVE: 0
GASTROINTESTINAL NEGATIVE: 0
CONSTITUTIONAL NEGATIVE: 0
CARDIOVASCULAR NEGATIVE: 0
ENDOCRINE NEGATIVE: 0

## 2024-06-03 ASSESSMENT — PAIN SCALES - GENERAL: PAINLEVEL: 0-NO PAIN

## 2024-06-03 NOTE — H&P (VIEW-ONLY)
Subjective   Patient ID: Jen Franks is a 38 y.o. female who presents for IUD Concerns.  Pt continues to have pelvic pain with her IUD   She had it placed 6 months ago for pain and heavy bleeding   The bleeding has improved but she still has cramps         Review of Systems   Genitourinary:  Positive for menstrual problem and pelvic pain.       Objective   Physical Exam  Genitourinary:     General: Normal vulva.      Vagina: Normal.      Cervix: Normal.      Uterus: Normal.       Adnexa: Right adnexa normal.      Comments: IUD is in place    No pain on manual exam  Neurological:      Mental Status: She is alert.         Assessment/Plan   Diagnoses and all orders for this visit:  Pelvic pain  -     diclofenac (Cataflam) 50 mg tablet; Take 1 tablet (50 mg) by mouth 3 times a day.    Pt encouraged to try the IUD for a few more months and trying to take NSAIDS    Briefly discussed possible hysterectomy if her pain cannot be managed        Bridgette Cesar MD 06/03/24 3:29 PM

## 2024-06-04 RX ORDER — DICLOFENAC POTASSIUM 50 MG/1
50 TABLET, FILM COATED ORAL 3 TIMES DAILY
Qty: 90 TABLET | Refills: 11 | Status: SHIPPED | OUTPATIENT
Start: 2024-06-04 | End: 2025-06-04

## 2024-06-06 ENCOUNTER — HOSPITAL ENCOUNTER (OUTPATIENT)
Dept: RADIOLOGY | Facility: HOSPITAL | Age: 39
Discharge: HOME | End: 2024-06-06
Payer: COMMERCIAL

## 2024-06-06 DIAGNOSIS — S43.431D SUPERIOR GLENOID LABRUM LESION OF RIGHT SHOULDER, SUBSEQUENT ENCOUNTER: ICD-10-CM

## 2024-06-06 PROCEDURE — A9577 INJ MULTIHANCE: HCPCS | Performed by: ORTHOPAEDIC SURGERY

## 2024-06-06 PROCEDURE — 73222 MRI JOINT UPR EXTREM W/DYE: CPT | Mod: RIGHT SIDE | Performed by: STUDENT IN AN ORGANIZED HEALTH CARE EDUCATION/TRAINING PROGRAM

## 2024-06-06 PROCEDURE — 2550000001 HC RX 255 CONTRASTS: Performed by: ORTHOPAEDIC SURGERY

## 2024-06-06 PROCEDURE — 73040 CONTRAST X-RAY OF SHOULDER: CPT | Mod: RT

## 2024-06-06 PROCEDURE — 73222 MRI JOINT UPR EXTREM W/DYE: CPT | Mod: RT

## 2024-06-06 PROCEDURE — 2500000005 HC RX 250 GENERAL PHARMACY W/O HCPCS

## 2024-06-06 RX ORDER — LIDOCAINE HYDROCHLORIDE 10 MG/ML
INJECTION INFILTRATION; PERINEURAL
Status: COMPLETED
Start: 2024-06-06 | End: 2024-06-06

## 2024-06-06 RX ORDER — LIDOCAINE HYDROCHLORIDE 10 MG/ML
7 INJECTION INFILTRATION; PERINEURAL ONCE
Status: CANCELLED | OUTPATIENT
Start: 2024-06-06 | End: 2024-06-06

## 2024-06-06 RX ADMIN — IOHEXOL 7 ML: 240 INJECTION, SOLUTION INTRATHECAL; INTRAVASCULAR; INTRAVENOUS; ORAL at 08:54

## 2024-06-06 RX ADMIN — LIDOCAINE HYDROCHLORIDE 7 ML: 10 INJECTION, SOLUTION INFILTRATION; PERINEURAL at 08:54

## 2024-06-06 RX ADMIN — GADOBENATE DIMEGLUMINE 0.1 ML: 529 INJECTION, SOLUTION INTRAVENOUS at 08:55

## 2024-06-06 NOTE — POST-PROCEDURE NOTE
Interventional Radiology Brief Postprocedure Note    Attending: Car Moffett CNP    Assistant: none    Diagnosis: right shoulder pain    Description of procedure:  The patient was placed in a supine position on the fluoroscopic table and was prepped and draped in usual sterile fashion. The right shoulder joint was localized under fluoroscopy. Lidocaine was used for local anesthesia. Under fluoroscopic guidance a 20 gauge needle was inserted into the right shoulder joint. Approximately 14 mL of solution containing lidocaine, Omnipaque 240 iodinated contrast and Multihance gadolinium contrast was injected into the right shoulder joint.         Anesthesia:  Local    Complications: None    Estimated Blood Loss: none    Medications (Filter: Administrations occurring from 0838 to 0838 on 06/06/24) As of 06/06/24 0838      None          No specimens collected      See detailed result report with images in PACS.    The patient tolerated the procedure well without incident or complication and is in stable condition.

## 2024-06-10 ENCOUNTER — OFFICE VISIT (OUTPATIENT)
Dept: ORTHOPEDIC SURGERY | Facility: HOSPITAL | Age: 39
End: 2024-06-10
Payer: COMMERCIAL

## 2024-06-10 DIAGNOSIS — S43.431D SUPERIOR GLENOID LABRUM LESION OF RIGHT SHOULDER, SUBSEQUENT ENCOUNTER: Primary | ICD-10-CM

## 2024-06-10 PROCEDURE — 99213 OFFICE O/P EST LOW 20 MIN: CPT | Performed by: ORTHOPAEDIC SURGERY

## 2024-06-10 RX ORDER — MELOXICAM 15 MG/1
15 TABLET ORAL DAILY
Qty: 14 TABLET | Refills: 0 | Status: SHIPPED | OUTPATIENT
Start: 2024-06-10 | End: 2024-06-24

## 2024-06-10 NOTE — PROGRESS NOTES
Continue is here for follow-up and MRI scan of her right shoulder she continues to have pain.    MRI scan shows edema in the glenoid and humeral head.  Also evidence of repaired SLAP lesion with no suggestion of free tear.  No new pathology noted.    Right shoulder SLAP lesion    I recommend the patient rest the shoulder and perform activities that are free of pain.  I recommended a course of anti-inflammatory medication.  Prognosis is very good.  Would like to follow-up again in 3 to 4 weeks.  This was dictated using voice recognition software and not corrected for grammatical or spelling errors.

## 2024-07-01 ENCOUNTER — OFFICE VISIT (OUTPATIENT)
Dept: ORTHOPEDIC SURGERY | Facility: HOSPITAL | Age: 39
End: 2024-07-01
Payer: COMMERCIAL

## 2024-07-01 VITALS — WEIGHT: 165 LBS | HEIGHT: 63 IN | BODY MASS INDEX: 29.23 KG/M2

## 2024-07-01 DIAGNOSIS — M25.511 RIGHT SHOULDER PAIN, UNSPECIFIED CHRONICITY: Primary | ICD-10-CM

## 2024-07-01 PROCEDURE — 99212 OFFICE O/P EST SF 10 MIN: CPT | Performed by: ORTHOPAEDIC SURGERY

## 2024-07-01 NOTE — PROGRESS NOTES
Persistence of pain in her right shoulder despite anti-inflammatory medicine rest and with activity modification and therapy.    She has nearly full range of motion but painful arc of motion and particularly elevation in neutral rotation and positive Dyer's test.  Motor power and biceps flexion is 5/5.    Right shoulder pain    We discussed the differential diagnosis including adhesions or pain from residual labrum pathology.  I recommended revision surgery specifically:    Diagnostic arthroscopy right shoulder with debridement and possible biceps tenodesis.    We discussed the treatment options and alternatives and the rehabilitation sequence required after surgery the patient is interested in pursuing would like to schedule sometime in the near future.    This was dictated using voice recognition software and not corrected for grammatical or spelling errors.

## 2024-07-05 ENCOUNTER — PREP FOR PROCEDURE (OUTPATIENT)
Dept: ORTHOPEDIC SURGERY | Facility: HOSPITAL | Age: 39
End: 2024-07-05
Payer: COMMERCIAL

## 2024-07-05 DIAGNOSIS — S43.431S LABRAL TEAR OF SHOULDER, RIGHT, SEQUELA: ICD-10-CM

## 2024-07-19 ENCOUNTER — DOCUMENTATION (OUTPATIENT)
Dept: PHYSICAL THERAPY | Facility: CLINIC | Age: 39
End: 2024-07-19
Payer: COMMERCIAL

## 2024-07-19 NOTE — PROGRESS NOTES
Physical Therapy    Discharge Summary    Name: Jen Franks  MRN: [id number]  : 1985  Date: 24    Discharge Summary: PT    Discharge Information: Date of discharge 24, Date of last visit 24, Date of evaluation 24, Number of attended visits 25, Referred by Dr. Monae, and Referred for Right shoulder pain s/p SLAP repair    Therapy Summary: UE/scapular strength and flexibility.    Discharge Status: Patient was referred back to MD due to pain that was not going away.     Rehab Discharge Reason: Other - scheduled revision surgery

## 2024-07-24 ENCOUNTER — LAB (OUTPATIENT)
Dept: LAB | Facility: LAB | Age: 39
End: 2024-07-24
Payer: COMMERCIAL

## 2024-07-24 ENCOUNTER — PRE-ADMISSION TESTING (OUTPATIENT)
Dept: PREADMISSION TESTING | Facility: HOSPITAL | Age: 39
End: 2024-07-24
Payer: COMMERCIAL

## 2024-07-24 VITALS
RESPIRATION RATE: 14 BRPM | BODY MASS INDEX: 29.08 KG/M2 | TEMPERATURE: 98.1 F | HEART RATE: 90 BPM | OXYGEN SATURATION: 98 % | WEIGHT: 164.13 LBS | DIASTOLIC BLOOD PRESSURE: 71 MMHG | HEIGHT: 63 IN | SYSTOLIC BLOOD PRESSURE: 104 MMHG

## 2024-07-24 DIAGNOSIS — Z01.818 PREOP TESTING: ICD-10-CM

## 2024-07-24 DIAGNOSIS — S43.431S LABRAL TEAR OF SHOULDER, RIGHT, SEQUELA: ICD-10-CM

## 2024-07-24 DIAGNOSIS — Z01.818 PREOP TESTING: Primary | ICD-10-CM

## 2024-07-24 LAB
ANION GAP SERPL CALC-SCNC: 11 MMOL/L (ref 10–20)
BUN SERPL-MCNC: 12 MG/DL (ref 6–23)
CALCIUM SERPL-MCNC: 9.2 MG/DL (ref 8.6–10.3)
CHLORIDE SERPL-SCNC: 105 MMOL/L (ref 98–107)
CO2 SERPL-SCNC: 26 MMOL/L (ref 21–32)
CREAT SERPL-MCNC: 0.83 MG/DL (ref 0.5–1.05)
EGFRCR SERPLBLD CKD-EPI 2021: >90 ML/MIN/1.73M*2
ERYTHROCYTE [DISTWIDTH] IN BLOOD BY AUTOMATED COUNT: 11.6 % (ref 11.5–14.5)
GLUCOSE SERPL-MCNC: 94 MG/DL (ref 74–99)
HCT VFR BLD AUTO: 39.1 % (ref 36–46)
HGB BLD-MCNC: 13.3 G/DL (ref 12–16)
MCH RBC QN AUTO: 31 PG (ref 26–34)
MCHC RBC AUTO-ENTMCNC: 34 G/DL (ref 32–36)
MCV RBC AUTO: 91 FL (ref 80–100)
NRBC BLD-RTO: NORMAL /100{WBCS}
PLATELET # BLD AUTO: 283 X10*3/UL (ref 150–450)
POTASSIUM SERPL-SCNC: 4.1 MMOL/L (ref 3.5–5.3)
RBC # BLD AUTO: 4.29 X10*6/UL (ref 4–5.2)
SODIUM SERPL-SCNC: 138 MMOL/L (ref 136–145)
WBC # BLD AUTO: 9.2 X10*3/UL (ref 4.4–11.3)

## 2024-07-24 PROCEDURE — 36415 COLL VENOUS BLD VENIPUNCTURE: CPT

## 2024-07-24 PROCEDURE — 85027 COMPLETE CBC AUTOMATED: CPT

## 2024-07-24 PROCEDURE — 99203 OFFICE O/P NEW LOW 30 MIN: CPT

## 2024-07-24 PROCEDURE — 80048 BASIC METABOLIC PNL TOTAL CA: CPT

## 2024-07-24 RX ORDER — BUSPIRONE HYDROCHLORIDE 10 MG/1
10 TABLET ORAL 3 TIMES DAILY
COMMUNITY

## 2024-07-24 RX ORDER — TRAZODONE HYDROCHLORIDE 50 MG/1
150 TABLET ORAL NIGHTLY
COMMUNITY

## 2024-07-24 ASSESSMENT — DUKE ACTIVITY SCORE INDEX (DASI)
CAN YOU DO HEAVY WORK AROUND THE HOUSE LIKE SCRUBBING FLOORS OR LIFTING AND MOVING HEAVY FURNITURE: YES
CAN YOU DO YARD WORK LIKE RAKING LEAVES, WEEDING OR PUSHING A MOWER: YES
CAN YOU PARTICIPATE IN STRENOUS SPORTS LIKE SWIMMING, SINGLES TENNIS, FOOTBALL, BASKETBALL, OR SKIING: YES
TOTAL_SCORE: 58.2
CAN YOU TAKE CARE OF YOURSELF (EAT, DRESS, BATHE, OR USE TOILET): YES
CAN YOU DO MODERATE WORK AROUND THE HOUSE LIKE VACUUMING, SWEEPING FLOORS OR CARRYING GROCERIES: YES
CAN YOU HAVE SEXUAL RELATIONS: YES
CAN YOU PARTICIPATE IN MODERATE RECREATIONAL ACTIVITIES LIKE GOLF, BOWLING, DANCING, DOUBLES TENNIS OR THROWING A BASEBALL OR FOOTBALL: YES
DASI METS SCORE: 9.9
CAN YOU WALK INDOORS, SUCH AS AROUND YOUR HOUSE: YES
CAN YOU WALK A BLOCK OR TWO ON LEVEL GROUND: YES
CAN YOU DO LIGHT WORK AROUND THE HOUSE LIKE DUSTING OR WASHING DISHES: YES
CAN YOU CLIMB A FLIGHT OF STAIRS OR WALK UP A HILL: YES
CAN YOU RUN A SHORT DISTANCE: YES

## 2024-07-24 ASSESSMENT — PAIN DESCRIPTION - DESCRIPTORS: DESCRIPTORS: ACHING

## 2024-07-24 ASSESSMENT — PAIN SCALES - GENERAL: PAINLEVEL_OUTOF10: 6

## 2024-07-24 ASSESSMENT — PAIN - FUNCTIONAL ASSESSMENT: PAIN_FUNCTIONAL_ASSESSMENT: 0-10

## 2024-07-24 NOTE — PREPROCEDURE INSTRUCTIONS
Medication List            Accurate as of July 24, 2024  2:25 PM. Always use your most recent med list.                atomoxetine 80 mg capsule  Commonly known as: Strattera  Medication Adjustments for Surgery: Continue until night before surgery  Notes to patient: Last dose preoperatively 8/7/2024     busPIRone 10 mg tablet  Commonly known as: Buspar  Medication Adjustments for Surgery: Take morning of surgery with sip of water, no other fluids     CALTRATE 600 ORAL  Medication Adjustments for Surgery: Stop 7 days before surgery  Notes to patient: Last dose preoperatively 7/31/2024     cetirizine 10 mg tablet  Commonly known as: ZyrTEC  Medication Adjustments for Surgery: Stop 1 day before surgery  Notes to patient: Last dose preoperatively 8/7/2024     diclofenac 50 mg tablet  Commonly known as: Cataflam  Take 1 tablet (50 mg) by mouth 3 times a day.  Medication Adjustments for Surgery: Stop 7 days before surgery  Notes to patient: Last dose preoperatively 7/31/2024     diclofenac 75 mg EC tablet  Commonly known as: Voltaren  Take 1 tablet (75 mg) by mouth 2 times a day. Do not crush, chew, or split.  Medication Adjustments for Surgery: Stop 7 days before surgery  Notes to patient: Last dose preoperatively 7/31/2024     fluticasone 50 mcg/actuation nasal spray  Commonly known as: Flonase  Medication Adjustments for Surgery: Other (Comment)  Notes to patient: Last dose preoperatively 7/31/2024     Mirena 21 mcg/24 hr (8 yrs) 52 mg IUD  Generic drug: levonorgestrel  Medication Adjustments for Surgery: Other (Comment)     omeprazole 20 mg DR capsule  Commonly known as: PriLOSEC  Medication Adjustments for Surgery: Take morning of surgery with sip of water, no other fluids     propranolol 20 mg tablet  Commonly known as: Inderal  Medication Adjustments for Surgery: Take morning of surgery with sip of water, no other fluids     spironolactone 50 mg tablet  Commonly known as: Aldactone  Medication Adjustments for  Surgery: Take morning of surgery with sip of water, no other fluids     traZODone 50 mg tablet  Commonly known as: Desyrel  Medication Adjustments for Surgery: Take morning of surgery with sip of water, no other fluids     Vraylar 1.5 mg capsule  Generic drug: cariprazine  Medication Adjustments for Surgery: Take morning of surgery with sip of water, no other fluids            NPO Instructions:     Do not eat any food after midnight the night before your surgery/procedure.  You may have clear liquids until TWO hours before surgery/procedure. This includes water, black tea/coffee, (no milk or cream) apple juice and electrolyte drinks (Gatorade).  You may chew gum up to TWO hours before your surgery/procedure.     Additional Instructions:      Seven/Six Days before Surgery:  Review your medication instructions, stop indicated medications  Five Days before Surgery:  Review your medication instructions, stop indicated medications  Three Days before Surgery:  Review your medication instructions, stop indicated medications  The Day before Surgery:  Review your medication instructions, stop indicated medications  You will be contacted regarding the time of your arrival to facility and surgery time  Do not eat any food after Midnight  Day of Surgery:  Review your medication instructions, take indicated medications  If you have diabetes, please check your fasting blood sugar upon awakening.  If fasting blood sugar is <80 mg/dl, drink 100 ml of apple juice, time limit of 2 hours before  You may have clear liquids until TWO hours before surgery/procedure.  This includes water, black tea/coffee, (no milk or cream) apple juice and electrolyte drinks (Gatorade)  You may chew gum up to TWO hours before your surgery/procedure  Wear  comfortable loose fitting clothing  Do not use moisturizers, creams, lotions or perfume  All jewelry and valuables should be left at home     CONTACT SURGEON'S OFFICE IF YOU DEVELOP:  * Fever = 100.4 F    * New respiratory symptoms (e.g. cough, shortness of breath, respiratory distress, sore throat)  * Recent loss of taste or smell  *Flu like symptoms such as headache, fatigue or gastrointestinal symptoms  * You develop any open sores, shingles, burning or painful urination   AND/OR:  * You no longer wish to have the surgery.  * Any other personal circumstances change that may lead to the need to cancel or defer this surgery.  *You were admitted to any hospital within one week of your planned procedure.     SMOKING:  *Quitting smoking can make a huge difference to your health and recovery from surgery.    *If you need help with quitting, call 2-669-QUIT-NOW.     THE DAY BEFORE SURGERY:  *Do not eat any food after midnight the night before your surgery.   *You may have up to TEN OUNCES of clear liquids until TWO hours before your instructed ARRIVAL TIME to hospital. This includes water, black tea/coffee, (no milk or cream) apple juice, clear broth and electrolyte drinks (Gatorade). Please avoid clear liquids that are red in color.   *You may chew gum/mints up to TWO hours before your surgery/procedure.     SURGICAL TIME:  *You will be contacted between 2 p.m. and 3 p.m. the business day before your surgery with your arrival time.  *If you haven't received a call by 3pm, call [phone]  *Scheduled surgery times may change and you will be notified if this occurs-check your personal voicemail for any updates.     ON THE MORNING OF SURGERY:  *Wear comfortable, loose fitting clothing.   *Do not use moisturizers, creams, lotions or perfume.  *All jewelry and valuables should be left at home.  *Prosthetic devices such as contact lenses, hearing aids, dentures, eyelash extensions, hairpins and body piercing must be removed before surgery.     BRING WITH YOU:  *Photo ID and insurance card  *Current list of medications and allergies  *Pacemaker/Defibrillator/Heart stent cards  *CPAP machine and  mask  *Slings/splints/crutches  *Copy of your complete Advanced Directive/DHPOA-if applicable  *Neurostimulator implant remote     PARKING AND ARRIVAL:  *Check in at the Main Entrance desk and let them know you are here for surgery.     IF YOU ARE HAVING OUTPATIENT/SAME DAY SURGERY:  *A responsible adult MUST accompany you at the time of discharge and stay with you for 24 hours after your surgery.  *You may NOT drive yourself home after surgery.  *You may use a taxi or ride sharing service ([employer], [employer]) to return home ONLY if you are accompanied by a friend or family member.  *Instructions for resuming your medications will be provided by your surgeon.     Thank you for coming to Pre Admission testing.      If I have prescribe medication please don't forget to  at your pharmacy.      Any questions about today's visit call [phone] and leave a message in the general mailbox.     Patient instructed to ambulate as soon as possible postoperatively to decrease thromboembolic risk.     Luis Hanks, APRN-CNP     Thank you for visiting [facility].  If you have any changes to your health condition, please call the surgeons office to alert them and give them details of your symptoms.        Preoperative Fasting Guidelines     Why must I stop eating and drinking near surgery time?  With sedation, food or liquid in your stomach can enter your lungs causing serious complications  Increases nausea and vomiting     When do I need to stop eating and drinking before my surgery?  Do not eat any food after midnight the night before your surgery/procedure.  You may have up to TEN ounces of clear liquid until TWO hours before your instructed arrival time to the hospital.  This includes water, black tea/coffee, (no milk or cream) apple juice, and electrolyte drinks (Gatorade)  You may chew gum until TWO hours before your surgery/procedure        Additional Instructions:      The Day before  Surgery:  -Review your medication instructions, stop indicated medications  -You will be contacted in the evening regarding the time of your arrival to facility and surgery time     Day of Surgery:  -Review your medication instructions, take indicated medications  -Wear comfortable loose fitting clothing  -Do not use moisturizers, creams, lotions or perfume  -All jewelry and valuables should be left at home                   Preoperative Brain Exercises     What are brain exercises?  A brain exercise is any activity that engages your thinking (cognitive) skills.     What types of activities are considered brain exercises?  Jigsaw puzzles, crossword puzzles, word jumble, memory games, word search, and many more.  Many can be found free online or on your phone via a mobile amparo.     Why should I do brain exercises before my surgery?  More recent research has shown brain exercise before surgery can lower the risk of postoperative delirium (confusion) which can be especially important for older adults.  Patients who did brain exercises for 5 to 10 hours the days before surgery, cut their risk of postoperative delirium in half up to 1 week after surgery.                         [facility]     Preoperative Deep Breathing Exercises     Why it is important to do deep breathing exercises before my surgery?  Deep breathing exercises strengthen your breathing muscles.  This helps you to recover after your surgery and decreases the chance of breathing complications.        How are the deep breathing exercises done?  Sit straight with your back supported.  Breathe in deeply and slowly through your nose. Your lower rib cage should expand and your abdomen may move forward.  Hold that breath for 3 to 5 seconds.  Breathe out through pursed lips, slowly and completely.  Rest and repeat 10 times every hour while awake.  Rest longer if you become dizzy or lightheaded.                      [facility]     Preoperative Deep Breathing Exercises     Why it is important to do deep breathing exercises before my surgery?  Deep breathing exercises strengthen your breathing muscles.  This helps you to recover after your surgery and decreases the chance of breathing complications.        How are the deep breathing exercises done?  Sit straight with your back supported.  Breathe in deeply and slowly through your nose. Your lower rib cage should expand and your abdomen may move forward.  Hold that breath for 3 to 5 seconds.  Breathe out through pursed lips, slowly and completely.  Rest and repeat 10 times every hour while awake.  Rest longer if you become dizzy or lightheaded.        Patient Information: Incentive Spirometer  What is an incentive spirometer?  An incentive spirometer is a device used before and after surgery to “exercise” your lungs.  It helps you to take deeper breaths to expand your lungs.  Below is an example of a basic incentive spirometer.  The device you receive may differ slightly but they all function the same.    Why do I need to use an incentive spirometer?  Using your incentive spirometer prepares your lungs for surgery and helps prevent lung problems after surgery.  How do I use my incentive spirometer?  When you're using your incentive spirometer, make sure to breathe through your mouth. If you breathe through your nose, the incentive spirometer won't work properly. You can hold your nose if you have trouble.  If you feel dizzy at any time, stop and rest. Try again at a later time.  Follow the steps below:  Set up your incentive spirometer, expand the flexible tubing and connect to the outlet.  Sit upright in a chair or bed. Hold the incentive spirometer at eye level.   Put the mouthpiece in your mouth and close your lips tightly around it. Slowly breathe out (exhale) completely.  Breathe in (inhale) slowly through your mouth as deeply as you can. As you take a breath, you  will see the piston rise inside the large column. While the piston rises, the indicator should move upwards. It should stay in between the 2 arrows (see Figure).  Try to get the piston as high as you can, while keeping the indicator between the arrows.   If the indicator doesn't stay between the arrows, you're breathing either too fast or too slow.  When you get it as high as you can, hold your breath for 10 seconds, or as long as possible. While you're holding your breath, the piston will slowly fall to the base of the spirometer.  Once the piston reaches the bottom of the spirometer, breathe out slowly through your mouth. Rest for a few seconds.  Repeat 10 times. Try to get the piston to the same level with each breath.  Repeat every hour while awake  You can carefully clean the outside of the mouthpiece with an alcohol wipe or soap and water.       Patient and Family Education             Ways You Can Help Prevent Blood Clots                    This handout explains some simple things you can do to help prevent blood clots.      Blood clots are blockages that can form in the body's veins. When a blood clot forms in your deep veins, it may be called a deep vein thrombosis, or DVT for short. Blood clots can happen in any part of the body where blood flows, but they are most common in the arms and legs. If a piece of a blood clot breaks free and travels to the lungs, it is called a pulmonary embolus (PE). A PE can be a very serious problem.         Being in the hospital or having surgery can raise your chances of getting a blood clot because you may not be well enough to move around as much as you normally do.         Ways you can help prevent blood clots in the hospital           Wearing SCDs. SCDs stands for Sequential Compression Devices.   SCDs are special sleeves that wrap around your legs  They attach to a pump that fills them with air to gently squeeze your legs every few minutes.   This helps return the  blood in your legs to your heart.   SCDs should only be taken off when walking or bathing.   SCDs may not be comfortable, but they can help save your life.                                            Wearing compression stockings - if your doctor orders them. These special snug fitting stockings gently squeeze your legs to help blood flow.       Walking. Walking helps move the blood in your legs.   If your doctor says it is ok, try walking the halls at least   5 times a day. Ask us to help you get up, so you don't fall.      Taking any blood thinning medicines your doctor orders.        Page 1 of 2            [facility]; 3/23   Ways you can help prevent blood clots at home         Wearing compression stockings - if your doctor orders them. ? Walking - to help move the blood in your legs.       Taking any blood thinning medicines your doctor orders.      Signs of a blood clot or PE        Tell your doctor or nurse know right away if you have of the problems listed below.    If you are at home, seek medical care right away. Call 911 for chest pain or problems breathing.                Signs of a blood clot (DVT) - such as pain,  swelling, redness or warmth in your arm or leg      Signs of a pulmonary embolism (PE) - such as chest     pain or feeling short of breath

## 2024-07-24 NOTE — CPM/PAT H&P
CPM/PAT Evaluation       Name: Jen Franks (Jen Franks)  /Age: 1985/39 y.o.     In-Person       Chief Complaint: Labral tear of shoulder, right, sequela    HPI  Patient is an alert and oriented 39 year old female scheduled for a diagnostic arthroscopy right shoulder with debridement and possible biceps tenodesis on 2024 with Dr. Monae for labral tear of shoulder, right, sequela. PMHX includes ADHD, anxiety, Bipolar, TMJ,depression,  and GERD. Presents to [facility] today for perioperative risk stratification and optimization.     Past Medical History:   Diagnosis Date    ADHD (attention deficit hyperactivity disorder)     Adverse effect of anesthesia     Postop agitation    Anxiety     Bipolar affect, depressed (Multi)     Other conditions influencing health status 2017    History of frequent headaches    Other specified disorders of temporomandibular joint 2017    Temporomandibular jaw dysfunction     Past Surgical History:   Procedure Laterality Date    KNEE Bilateral     Arthroscopy    OTHER SURGICAL HISTORY  2022    Knee surgery    SHOULDER ARTHROSCOPY Right      Patient  has no history on file for sexual activity.    No family history on file.    Allergies   Allergen Reactions    Egg GI Upset    Meperidine Other     PANIC ATTACK     Prior to Admission medications    Medication Sig Start Date End Date Taking? Authorizing Provider   atomoxetine (Strattera) 60 mg capsule  24   Historical Provider, MD   cetirizine (ZyrTEC) 10 mg tablet Take 1 tablet (10 mg) by mouth once daily. MORNING    Historical Provider, MD   diclofenac (Cataflam) 50 mg tablet Take 1 tablet (50 mg) by mouth 3 times a day. 24  Bridgette Cesar MD   diclofenac (Voltaren) 75 mg EC tablet Take 1 tablet (75 mg) by mouth 2 times a day. Do not crush, chew, or split. 24  Tesfaye Monae MD   fluticasone (Flonase) 50 mcg/actuation nasal spray 2 sprays once daily as needed for rhinitis or  allergies. 12/19/16   Historical Provider, MD   glucos sul 2KCl/msm/chond/C/Mn (GLUCOSAMINE CHONDROITIN ORAL) Take 1 tablet by mouth once daily. 4050  MG    Historical Provider, MD   levonorgestrel (Mirena) 21 mcg/24 hr (8 yrs) 52 mg IUD by intrauterine route.    Historical Provider, MD   magnesium oxide (Mag-Ox) 400 mg tablet Take 1 tablet (400 mg) by mouth once daily.    Historical Provider, MD   omeprazole (PriLOSEC) 20 mg DR capsule Take by mouth. 4/12/24   Historical Provider, MD   propranolol (Inderal) 20 mg tablet Take 1 tablet (20 mg) by mouth once daily. EVERY MORNING FOR TREATMENT OF ANXIETY 6/28/21   Historical Provider, MD   spironolactone (Aldactone) 50 mg tablet Take 1 tablet (50 mg) by mouth 2 times a day. ACNE    Historical Provider, MD   VALERIAN ROOT ORAL Take 2,400 mg by mouth once daily.    Historical Provider, MD   ALPRAZolam (Xanax) 0.25 mg tablet  5/30/24 7/24/24  Historical Provider, MD       Review of Systems   Constitutional: Negative for chills, decreased appetite, diaphoresis, fever and malaise/fatigue.   Eyes:  Negative for blurred vision and double vision.   Cardiovascular:  Negative for chest pain, claudication, cyanosis, dyspnea on exertion, irregular heartbeat, leg swelling, near-syncope and palpitations.   Respiratory:  Negative for cough, hemoptysis, shortness of breath and wheezing.    Endocrine: Negative for cold intolerance, heat intolerance, polydipsia, polyphagia and polyuria.   Gastrointestinal:  Negative for abdominal pain, constipation, diarrhea, dysphagia, nausea and vomiting.   Genitourinary:  Negative for bladder incontinence, dysuria, hematuria, incomplete emptying, nocturia, frequency, pelvic pain and urgency.   Neurological:  Negative for headaches, light-headedness, paresthesias, sensory change and weakness.   Psychiatric/Behavioral:  Negative for altered mental status.    Musculoskeletal: Negative for myalgias. Positive for arthralgias. Limited ROM right shoulder    "  Vitals and nursing note reviewed.     Physical exam  Constitutional:       Appearance: Normal appearance. She is Overweight.   HENT:      Head: Normocephalic and atraumatic.      Mouth/Throat:      Mouth: Mucous membranes are moist.      Pharynx: Oropharynx is clear.   Eyes:      Extraocular Movements: Extraocular movements intact.      Conjunctiva/sclera: Conjunctivae normal.      Pupils: Pupils are equal, round, and reactive to light.   Cardiovascular:      PMI at left midclavicular line. Normal rate. Regular rhythm. Normal S1. Normal S2.       Murmurs: There is no murmur.      No gallop.  No click. No rub.       No audible carotid bruit     No lower extremity edema on exam  Pulmonary:      Effort: Pulmonary effort is normal.      Breath sounds: Normal breath sounds.   Abdominal:      General: Abdomen is flat. Bowel sounds are normal.      Palpations: Abdomen is soft and non-tender  Musculoskeletal:      Cervical back: Normal range of motion and neck supple.   Skin:     General: Skin is warm and dry.      Capillary Refill: Capillary refill takes less than 2 seconds.   Neurological:      General: No focal deficit present.      Mental Status: She is alert and oriented to person, place, and time. Mental status is at baseline.   Psychiatric:         Mood and Affect: Mood normal.         Behavior: Behavior normal.         Thought Content: Thought content normal.         Judgment: Judgment normal.     Vitals and nursing note reviewed. Physical exam within normal limits.     Visit Vitals  /71   Pulse 90   Temp 36.7 °C (98.1 °F) (Temporal)   Resp 14   Ht 1.6 m (5' 2.99\")   Wt 74.5 kg (164 lb 2.1 oz)   LMP 07/23/2024 (Approximate)   SpO2 98%   BMI 29.08 kg/m²   OB Status Implant   Smoking Status Former   BSA 1.82 m²      DASI Risk Score      Flowsheet Row Most Recent Value   DASI SCORE 58.2   METS Score (Will be calculated only when all the questions are answered) 9.9          Caprini DVT Assessment      Flowsheet " Row Most Recent Value   DVT Score 3   Current Status Major surgery planned, including arthroscopic and laproscopic (1-2 hours)   Age Less than 40 years   BMI 30 or less          Modified Frailty Index      Flowsheet Row Most Recent Value   Modified Frailty Index Calculator 0          CHADS2 Stroke Risk  Current as of 25 minutes ago        N/A 3 to 100%: High Risk   2 to < 3%: Medium Risk   0 to < 2%: Low Risk     Last Change: N/A          This score determines the patient's risk of having a stroke if the patient has atrial fibrillation.        This score is not applicable to this patient. Components are not calculated.          Revised Cardiac Risk Index      Flowsheet Row Most Recent Value   Revised Cardiac Risk Calculator 0          Apfel Simplified Score    No data to display       Risk Analysis Index Results This Encounter    No data found in the last 1 encounters.       Stop Bang Score      Flowsheet Row Most Recent Value   Do you snore loudly? 0   Do you often feel tired or fatigued after your sleep? 1   Has anyone ever observed you stop breathing in your sleep? 1   Do you have or are you being treated for high blood pressure? 0   Recent BMI (Calculated) 29.1   Is BMI greater than 35 kg/m2? 0=No   Age older than 50 years old? 0=No   Is your neck circumference greater than 17 inches (Male) or 16 inches (Female)? 0   Gender - Male 0=No   STOP-BANG Total Score 2          Assessment & Plan:    Neuro:  No significant findings on chart review or clinical presentation and evaluation.   History of Anxiety-Managed with Propanolol and Xanax.    HEENT/Airway:  No diagnosis or significant findings on chart review or clinical presentation and evaluation.   STOP-BANG Score-2 points low risk for MARIELY  History of MARIELY-was told she did not need a CPAP at diagnosis, mild.     Mallampati::  II    TM distance::  >3 FB    Neck ROM::  Full  Dentures-denies  Crowns-denies  Implants-denies    Cardiovascular:  No diagnosis or  significant findings on chart review or clinical presentation and evaluation.   METS: 9.9  RCRI: 0 points, 3.9%  risk for postoperative MACE   ROD: 0.1% risk for postoperative MACE    Pulmonary:  No diagnosis or significant findings on chart review or clinical presentation and evaluation.   ARISCAT: <26 points, 1.6% risk of in-hospital postoperative pulmonary complication  PRODIGY: Low risk for opioid induced respiratory depression  Smoking History-reports she is a former smoker. Quit 6 years ago. Previously smoked 1PPD x 15 years  Discussed smoking cessation and deep breathing handout given    Renal:  No diagnosis or significant findings on chart review or clinical presentation and evaluation.  CMP-Reviewed, stable    Endocrine:  No diagnosis or significant findings on chart review or clinical presentation and evaluation.     Hematologic/Immunology:  No diagnosis or significant findings on chart review or clinical presentation and evaluation.  CBC-Reviewed, stable  HGB-13.3  Caprini Score 3, patient at Moderate for postoperative DVT. Pt supplied education/VTE handout    Gastrointestinal:   No significant findings on chart review or clinical presentation and evaluation.   History of GERD-Managed with Omeprazole  Alcohol use-denies  Drug use-denies    Infectious disease:   No diagnosis or significant findings on chart review or clinical presentation and evaluation.     Musculoskeletal:   No diagnosis or significant findings on chart review or clinical presentation and evaluation.   JHFRAT score-5 points. low risk for falls    Anesthesia:  No anesthesia complications, +TMJ  No family history of anesthesia complications    Labs & Imaging ordered:  CBC, BMP  Nickel/metal allergy-negative  Shellfish allergy-negative    Overall, patient Low Risk for the scheduled Low Risk surgery. Discussed with patient medication instructions, NPO guidelines, and any questions or concerns.     Face to face time-25 minutes

## 2024-08-08 ENCOUNTER — ANESTHESIA EVENT (OUTPATIENT)
Dept: OPERATING ROOM | Facility: HOSPITAL | Age: 39
End: 2024-08-08
Payer: COMMERCIAL

## 2024-08-08 ENCOUNTER — ANESTHESIA (OUTPATIENT)
Dept: OPERATING ROOM | Facility: HOSPITAL | Age: 39
End: 2024-08-08
Payer: COMMERCIAL

## 2024-08-08 ENCOUNTER — HOSPITAL ENCOUNTER (OUTPATIENT)
Facility: HOSPITAL | Age: 39
Setting detail: OUTPATIENT SURGERY
Discharge: HOME | End: 2024-08-08
Attending: ORTHOPAEDIC SURGERY | Admitting: ORTHOPAEDIC SURGERY
Payer: COMMERCIAL

## 2024-08-08 VITALS
WEIGHT: 159.39 LBS | DIASTOLIC BLOOD PRESSURE: 85 MMHG | HEART RATE: 90 BPM | HEIGHT: 63 IN | SYSTOLIC BLOOD PRESSURE: 115 MMHG | TEMPERATURE: 97.2 F | BODY MASS INDEX: 28.24 KG/M2 | OXYGEN SATURATION: 94 % | RESPIRATION RATE: 16 BRPM

## 2024-08-08 DIAGNOSIS — S43.431S LABRAL TEAR OF SHOULDER, RIGHT, SEQUELA: Primary | ICD-10-CM

## 2024-08-08 LAB — HCG UR QL IA.RAPID: NEGATIVE

## 2024-08-08 PROCEDURE — A4649 SURGICAL SUPPLIES: HCPCS | Performed by: ORTHOPAEDIC SURGERY

## 2024-08-08 PROCEDURE — 2500000004 HC RX 250 GENERAL PHARMACY W/ HCPCS (ALT 636 FOR OP/ED): Performed by: ANESTHESIOLOGY

## 2024-08-08 PROCEDURE — 81025 URINE PREGNANCY TEST: CPT | Performed by: ORTHOPAEDIC SURGERY

## 2024-08-08 PROCEDURE — 2720000007 HC OR 272 NO HCPCS: Performed by: ORTHOPAEDIC SURGERY

## 2024-08-08 PROCEDURE — 7100000001 HC RECOVERY ROOM TIME - INITIAL BASE CHARGE: Performed by: ORTHOPAEDIC SURGERY

## 2024-08-08 PROCEDURE — 2500000005 HC RX 250 GENERAL PHARMACY W/O HCPCS: Performed by: NURSE ANESTHETIST, CERTIFIED REGISTERED

## 2024-08-08 PROCEDURE — 3600000002 HC OR TIME - INITIAL BASE CHARGE - PROCEDURE LEVEL TWO: Performed by: ORTHOPAEDIC SURGERY

## 2024-08-08 PROCEDURE — 3700000001 HC GENERAL ANESTHESIA TIME - INITIAL BASE CHARGE: Performed by: ORTHOPAEDIC SURGERY

## 2024-08-08 PROCEDURE — A29828 PR ARTHROSCOPY SHOULDER SURGICAL BICEPS TENODESIS: Performed by: NURSE ANESTHETIST, CERTIFIED REGISTERED

## 2024-08-08 PROCEDURE — 3700000002 HC GENERAL ANESTHESIA TIME - EACH INCREMENTAL 1 MINUTE: Performed by: ORTHOPAEDIC SURGERY

## 2024-08-08 PROCEDURE — 7100000010 HC PHASE TWO TIME - EACH INCREMENTAL 1 MINUTE: Performed by: ORTHOPAEDIC SURGERY

## 2024-08-08 PROCEDURE — A29828 PR ARTHROSCOPY SHOULDER SURGICAL BICEPS TENODESIS: Performed by: ANESTHESIOLOGY

## 2024-08-08 PROCEDURE — 2780000003 HC OR 278 NO HCPCS: Performed by: ORTHOPAEDIC SURGERY

## 2024-08-08 PROCEDURE — 7100000002 HC RECOVERY ROOM TIME - EACH INCREMENTAL 1 MINUTE: Performed by: ORTHOPAEDIC SURGERY

## 2024-08-08 PROCEDURE — C1713 ANCHOR/SCREW BN/BN,TIS/BN: HCPCS | Performed by: ORTHOPAEDIC SURGERY

## 2024-08-08 PROCEDURE — 29828 SHO ARTHRS SRG BICP TENODSIS: CPT | Performed by: ORTHOPAEDIC SURGERY

## 2024-08-08 PROCEDURE — 2500000004 HC RX 250 GENERAL PHARMACY W/ HCPCS (ALT 636 FOR OP/ED): Performed by: ORTHOPAEDIC SURGERY

## 2024-08-08 PROCEDURE — 2500000004 HC RX 250 GENERAL PHARMACY W/ HCPCS (ALT 636 FOR OP/ED): Performed by: NURSE ANESTHETIST, CERTIFIED REGISTERED

## 2024-08-08 PROCEDURE — 7100000009 HC PHASE TWO TIME - INITIAL BASE CHARGE: Performed by: ORTHOPAEDIC SURGERY

## 2024-08-08 PROCEDURE — 3600000007 HC OR TIME - EACH INCREMENTAL 1 MINUTE - PROCEDURE LEVEL TWO: Performed by: ORTHOPAEDIC SURGERY

## 2024-08-08 DEVICE — KIT, TENOLOK TENODESIS, 6.0MM W/1 P2 SUTURE, 2.4GUIDEPIN, 7.5DRILL BIT: Type: IMPLANTABLE DEVICE | Site: SHOULDER | Status: FUNCTIONAL

## 2024-08-08 RX ORDER — ONDANSETRON HYDROCHLORIDE 2 MG/ML
4 INJECTION, SOLUTION INTRAVENOUS ONCE AS NEEDED
Status: CANCELLED | OUTPATIENT
Start: 2024-08-08

## 2024-08-08 RX ORDER — LIDOCAINE HYDROCHLORIDE 10 MG/ML
0.1 INJECTION, SOLUTION EPIDURAL; INFILTRATION; INTRACAUDAL; PERINEURAL ONCE
Status: CANCELLED | OUTPATIENT
Start: 2024-08-08 | End: 2024-08-08

## 2024-08-08 RX ORDER — OXYCODONE HYDROCHLORIDE 5 MG/1
5 TABLET ORAL EVERY 4 HOURS PRN
Status: CANCELLED | OUTPATIENT
Start: 2024-08-08

## 2024-08-08 RX ORDER — LABETALOL HYDROCHLORIDE 5 MG/ML
5 INJECTION, SOLUTION INTRAVENOUS ONCE AS NEEDED
Status: CANCELLED | OUTPATIENT
Start: 2024-08-08

## 2024-08-08 RX ORDER — SODIUM CHLORIDE, SODIUM LACTATE, POTASSIUM CHLORIDE, CALCIUM CHLORIDE 600; 310; 30; 20 MG/100ML; MG/100ML; MG/100ML; MG/100ML
75 INJECTION, SOLUTION INTRAVENOUS CONTINUOUS
Status: DISCONTINUED | OUTPATIENT
Start: 2024-08-08 | End: 2024-08-08 | Stop reason: HOSPADM

## 2024-08-08 RX ORDER — ONDANSETRON HYDROCHLORIDE 2 MG/ML
INJECTION, SOLUTION INTRAVENOUS AS NEEDED
Status: DISCONTINUED | OUTPATIENT
Start: 2024-08-08 | End: 2024-08-08

## 2024-08-08 RX ORDER — LIDOCAINE HYDROCHLORIDE 10 MG/ML
INJECTION, SOLUTION EPIDURAL; INFILTRATION; INTRACAUDAL; PERINEURAL AS NEEDED
Status: DISCONTINUED | OUTPATIENT
Start: 2024-08-08 | End: 2024-08-08

## 2024-08-08 RX ORDER — FENTANYL CITRATE 50 UG/ML
INJECTION, SOLUTION INTRAMUSCULAR; INTRAVENOUS AS NEEDED
Status: DISCONTINUED | OUTPATIENT
Start: 2024-08-08 | End: 2024-08-08

## 2024-08-08 RX ORDER — FENTANYL CITRATE 50 UG/ML
100 INJECTION, SOLUTION INTRAMUSCULAR; INTRAVENOUS ONCE
Status: COMPLETED | OUTPATIENT
Start: 2024-08-08 | End: 2024-08-08

## 2024-08-08 RX ORDER — CEFAZOLIN 1 G/1
2 INJECTION, POWDER, FOR SOLUTION INTRAVENOUS ONCE
Status: DISCONTINUED | OUTPATIENT
Start: 2024-08-08 | End: 2024-08-08 | Stop reason: HOSPADM

## 2024-08-08 RX ORDER — ROCURONIUM BROMIDE 10 MG/ML
INJECTION, SOLUTION INTRAVENOUS AS NEEDED
Status: DISCONTINUED | OUTPATIENT
Start: 2024-08-08 | End: 2024-08-08

## 2024-08-08 RX ORDER — MIDAZOLAM HYDROCHLORIDE 1 MG/ML
2 INJECTION, SOLUTION INTRAMUSCULAR; INTRAVENOUS ONCE
Status: COMPLETED | OUTPATIENT
Start: 2024-08-08 | End: 2024-08-08

## 2024-08-08 RX ORDER — FENTANYL CITRATE 50 UG/ML
25 INJECTION, SOLUTION INTRAMUSCULAR; INTRAVENOUS EVERY 5 MIN PRN
Status: CANCELLED | OUTPATIENT
Start: 2024-08-08

## 2024-08-08 RX ORDER — OXYCODONE AND ACETAMINOPHEN 5; 325 MG/1; MG/1
1 TABLET ORAL EVERY 4 HOURS PRN
Qty: 42 TABLET | Refills: 0 | Status: SHIPPED | OUTPATIENT
Start: 2024-08-08 | End: 2024-08-15

## 2024-08-08 RX ORDER — FENTANYL CITRATE 50 UG/ML
50 INJECTION, SOLUTION INTRAMUSCULAR; INTRAVENOUS EVERY 5 MIN PRN
Status: CANCELLED | OUTPATIENT
Start: 2024-08-08

## 2024-08-08 RX ORDER — CEFAZOLIN SODIUM 2 G/100ML
INJECTION, SOLUTION INTRAVENOUS AS NEEDED
Status: DISCONTINUED | OUTPATIENT
Start: 2024-08-08 | End: 2024-08-08

## 2024-08-08 RX ORDER — PROPOFOL 10 MG/ML
INJECTION, EMULSION INTRAVENOUS AS NEEDED
Status: DISCONTINUED | OUTPATIENT
Start: 2024-08-08 | End: 2024-08-08

## 2024-08-08 RX ORDER — SODIUM CHLORIDE, SODIUM LACTATE, POTASSIUM CHLORIDE, CALCIUM CHLORIDE 600; 310; 30; 20 MG/100ML; MG/100ML; MG/100ML; MG/100ML
100 INJECTION, SOLUTION INTRAVENOUS CONTINUOUS
Status: CANCELLED | OUTPATIENT
Start: 2024-08-08

## 2024-08-08 ASSESSMENT — PAIN - FUNCTIONAL ASSESSMENT
PAIN_FUNCTIONAL_ASSESSMENT: 0-10

## 2024-08-08 ASSESSMENT — PAIN SCALES - GENERAL
PAINLEVEL_OUTOF10: 0 - NO PAIN
PAINLEVEL_OUTOF10: 3
PAINLEVEL_OUTOF10: 0 - NO PAIN
PAINLEVEL_OUTOF10: 2
PAINLEVEL_OUTOF10: 0 - NO PAIN

## 2024-08-08 ASSESSMENT — COLUMBIA-SUICIDE SEVERITY RATING SCALE - C-SSRS
6. HAVE YOU EVER DONE ANYTHING, STARTED TO DO ANYTHING, OR PREPARED TO DO ANYTHING TO END YOUR LIFE?: NO
2. HAVE YOU ACTUALLY HAD ANY THOUGHTS OF KILLING YOURSELF?: NO
1. IN THE PAST MONTH, HAVE YOU WISHED YOU WERE DEAD OR WISHED YOU COULD GO TO SLEEP AND NOT WAKE UP?: NO

## 2024-08-08 NOTE — ANESTHESIA PROCEDURE NOTES
Peripheral Block    Patient location during procedure: pre-op  Start time: 8/8/2024 11:01 AM  End time: 8/8/2024 11:03 AM  Reason for block: at surgeon's request and post-op pain management  Staffing  Performed: attending   Authorized by: Prosper Donis MD    Performed by: Prosper Donis MD  Preanesthetic Checklist  Completed: patient identified, IV checked, site marked, risks and benefits discussed, surgical consent, monitors and equipment checked, pre-op evaluation and timeout performed   Timeout performed at: 8/8/2024 10:58 AM  Peripheral Block  Patient position: laying flat  Prep: ChloraPrep  Patient monitoring: heart rate, cardiac monitor and continuous pulse ox  Block type: interscalene  Laterality: left  Injection technique: single-shot  Guidance: ultrasound guided  Local infiltration: ropivacaine  Infiltration strength: 0.5 %  Dose: 20 mL  Needle  Needle type: short-bevel   Needle gauge: 22 G  Needle length: 5 cm  Needle localization: ultrasound guidance  Assessment  Injection assessment: negative aspiration for heme, no paresthesia on injection, incremental injection and local visualized surrounding nerve on ultrasound  Paresthesia pain: none  Heart rate change: no  Slow fractionated injection: yes

## 2024-08-08 NOTE — ANESTHESIA PROCEDURE NOTES
Airway  Date/Time: 8/8/2024 11:17 AM  Urgency: elective    Airway not difficult    Staffing  Performed: CRNA   Authorized by: Prosper Donis MD    Performed by: YU Sullivan-TIMMY  Patient location during procedure: OR    Indications and Patient Condition  Indications for airway management: anesthesia  Spontaneous Ventilation: absent  Sedation level: deep  Preoxygenated: yes  Patient position: sniffing  Mask difficulty assessment: 1 - vent by mask    Final Airway Details  Final airway type: endotracheal airway      Successful airway: ETT  Cuffed: yes   Successful intubation technique: video laryngoscopy  Facilitating devices/methods: intubating stylet  Endotracheal tube insertion site: oral  Blade: Xavi  Blade size: #3  ETT size (mm): 7.0  Cormack-Lehane Classification: grade I - full view of glottis  Placement verified by: chest auscultation and capnometry   Cuff volume (mL): 8  Measured from: teeth  ETT to teeth (cm): 21  Number of attempts at approach: 1

## 2024-08-08 NOTE — PERIOPERATIVE NURSING NOTE
Assumed care of pt for lunch relief, pt awake, denies pain or PONV. Assessment completed as documented, orders reviewed, safety maintained.

## 2024-08-08 NOTE — PERIOPERATIVE NURSING NOTE
Upon changing position, with activity, pt stated mild feeling of nausea. Pt recievied antiemetic 90 min ago, liter of fluid, soothing scent introduced and educated.  Pt skin pink, VSS.

## 2024-08-08 NOTE — OP NOTE
Diagnostic arthroscopy RIGHT Shoulder with debridement and possible biceps tenodesis (Linvatec ) (R) Operative Note     Date: 2024  OR Location: [facility]    Name: Jen Franks, : 1985, Age: 39 y.o., MRN: [id number], Sex: female    Diagnosis  Pre-op Diagnosis      * Labral tear of shoulder, right, sequela [S43.431S] Post-op Diagnosis     * Labral tear of shoulder, right, sequela [S43.431S]     Procedures  Diagnostic arthroscopy RIGHT Shoulder with debridement and possible biceps tenodesis (Linvatec )  91671 - [place] SURGICAL ARTHROSCOPY SHOULDER LMTD DBRDMT /    [place] SURGICAL ARTHROSCOPY SHOULDER BICEPS TENODESIS [20538]  Surgeons      * Tesfaye Monae - Primary    Resident/Fellow/Other Assistant:  Surgeons and Role:  * No surgeons found with a matching role *    Procedure Summary  Anesthesia: Regional, General  ASA: II  Anesthesia Staff: Anesthesiologist: Prosper Donis MD  CRNA: YU Sullivan-CRNA  Estimated Blood Loss: 20 mL  Intra-op Medications: Administrations occurring from 1140 to 1310 on 24:  * No intraprocedure medications in log *           Anesthesia Record               Intraprocedure I/O Totals          Intake    ceFAZolin 2 gram/100 mL 100.00 mL    Total Intake 100 mL       Output    Est. Blood Loss 5 mL    Total Output 5 mL       Net    Net Volume 95 mL          Specimen: No specimens collected     Staff:   Circulator: Raegan Le Person: Brenda  Scrub Person: Patty         Drains and/or Catheters: * None in log *    Tourniquet Times:         Implants:  Implants       Type Name Action Serial No.      Implant KIT, TENOLOK TENODESIS, 6.0MM W/1 P2 SUTURE, 2.4GUIDEPIN, 7.5DRILL BIT - [id number] Implanted               Findings: Intact labral repair intact biceps tendon intact biceps anchor intact rotator cuff.  Thickened anterior capsule.    Indications: Jen Franks is an 39 y.o. female who is having surgery for Labral tear of shoulder, right, sequela [S43.431S].  Patient has  persistent symptoms after labral repair.  Pain is consistently anterior shoulder.  Unresponsive to nonoperative treatment measures.  Patient was counseled about options for treatment including potential benefit of surgery.  Possible risks and alternatives were explained as well as the rehabilitation sequence and the patient did request and schedule surgery.  She provided informed consent obtained on the date of the procedure.    The patient was seen in the preoperative area. The risks, benefits, complications, treatment options, non-operative alternatives, expected recovery and outcomes were discussed with the patient. The possibilities of reaction to medication, pulmonary aspiration, injury to surrounding structures, bleeding, recurrent infection, the need for additional procedures, failure to diagnose a condition, and creating a complication requiring transfusion or operation were discussed with the patient. The patient concurred with the proposed plan, giving informed consent.  The site of surgery was properly noted/marked if necessary per policy. The patient has been actively warmed in preoperative area. Preoperative antibiotics have been ordered and given within 1 hours of incision. Venous thrombosis prophylaxis have been ordered including bilateral sequential compression devices    Procedure Details: Patient was transferred to the operating placed supine on the operating table anesthesia was established an interscalene nerve block on the right side before delivery to the operating room with patient timeout was called the marked site confirmed patient identification confirmed procedure reviewed allergies reviewed antibiotics administered.  General endotracheal anesthesia staff successfully patient was moved to seated position (as per carefully padded sequential compression of ice were placed on her leg for DVT prophylaxis the patient's shoulder was examined under anesthesia and found to have nearly full range  of motion with exception of external rotation and abduction which was limited to 80 degrees.  The right upper extremity sterilely prepped and draped incision was made posteriorly diagnostic arthroscopy formed revealed the findings noted above decision was made performed capsular release and the rotator interval and the anterior capsule released approximately 5 o'clock position and then the intra-articular biceps was released.  An anterior incision was made of the deltopectoral interval brought through skin and subcutaneous tissues the deltopectoral interval was opened bluntly and the transverse humeral ligament identified and incised longitudinally the tendon was delivered out of the incision and then tenodesed into the bicipital groove using a tenolock anchor.  The subcutaneous tissue and skin were closed with dissolvable suture and nonabsorbable suture was used for the arthroscopy portals dry sterile dressing was applied and the patient's arm placed in a sling she was awakened and transferred to recovery room there were no complications  Complications:  None; patient tolerated the procedure well.    Disposition: PACU - hemodynamically stable.  Condition: stable         Additional Details: None    Attending Attestation: I was present and scrubbed for the entire procedure.    Tesfaye Monae  Phone Number: [phone]

## 2024-08-08 NOTE — ANESTHESIA PREPROCEDURE EVALUATION
Patient: Jen Franks    Procedure Information       Date/Time: 08/08/24 1140    Procedure: Diagnostic arthroscopy RIGHT Shoulder with debridement and possible biceps tenodesis (Linvatec ) (Right: Shoulder)    Location: [facility] 07 / Virtual [facility]    Surgeons: Tesfaye Monae MD            Relevant Problems   Neuro   (+) Cervical radiculopathy      Endocrine   (+) Multiple thyroid nodules       Clinical information reviewed:   Tobacco  Allergies  Meds   Med Hx  Surg Hx   Fam Hx  Soc Hx        NPO Detail:  NPO/Void Status  Date of Last Liquid: 08/08/24  Time of Last Liquid: 0400  Date of Last Solid: 08/07/24  Time of Last Solid: 2000  Time of Last Void: 1025         Physical Exam    Airway  Mallampati: II  TM distance: >3 FB  Neck ROM: full     Cardiovascular    Dental - normal exam     Pulmonary    Abdominal            Anesthesia Plan    History of general anesthesia?: yes  History of complications of general anesthesia?: no    ASA 2     general and regional     intravenous induction   Anesthetic plan and risks discussed with patient.    Plan discussed with CRNA.

## 2024-08-08 NOTE — H&P
History Of Present Illness  Jen Franks is a 39 y.o. female presenting with right shoulder pain and limited motion after labral repair.     Past Medical History  She has a past medical history of ADHD (attention deficit hyperactivity disorder), Adverse effect of anesthesia, Anxiety, Bipolar affect, depressed (Multi), Other conditions influencing health status (01/31/2017), and Other specified disorders of temporomandibular joint (01/31/2017).    Surgical History  She has a past surgical history that includes Other surgical history (01/31/2022); XR knee (Bilateral); and Shoulder arthroscopy (Right).     Social History  She reports that she quit smoking about 7 years ago. Her smoking use included cigarettes. She has never been exposed to tobacco smoke. She has never used smokeless tobacco. She reports that she does not currently use alcohol. She reports that she does not currently use drugs.    Family History  No family history on file.     Allergies  Egg and Meperidine    Review of Systems     Physical Exam     Last Recorded Vitals  There were no vitals taken for this visit.    Relevant Results      Scheduled medications    Continuous medications    PRN medications    No results found for this or any previous visit (from the past 24 hour(s)).    Assessment/Plan   Principal Problem:    Labral tear of shoulder, right, sequela      Plans for arthroscopy debridement of labrum biceps tenotomy followed by open biceps tenodesis.       I spent 15 minutes in the professional and overall care of this patient.      Tesfaye Monae MD

## 2024-08-08 NOTE — ANESTHESIA POSTPROCEDURE EVALUATION
Patient: Jen Franks    Procedure Summary       Date: 08/08/24 Room / Location: [facility] 07 / Virtual [facility]    Anesthesia Start: 1110 Anesthesia Stop: 1218    Procedure: Diagnostic arthroscopy RIGHT Shoulder with debridement and possible biceps tenodesis (Linvatec ) (Right: Shoulder) Diagnosis:       Labral tear of shoulder, right, sequela      (Labral tear of shoulder, right, sequela [S43.431S])    Surgeons: Tesfaye Monae MD Responsible Provider: Prosper Donis MD    Anesthesia Type: general, regional ASA Status: 2            Anesthesia Type: general, regional    Vitals Value Taken Time   BP 97/77 08/08/24 1230   Temp 36.3 °C (97.3 °F) 08/08/24 1215   Pulse 92 08/08/24 1230   Resp 15 08/08/24 1230   SpO2 92 % 08/08/24 1230       Anesthesia Post Evaluation    Patient location during evaluation: PACU  Patient participation: complete - patient participated  Level of consciousness: awake  Pain management: adequate  Airway patency: patent  Cardiovascular status: acceptable  Respiratory status: acceptable  Hydration status: acceptable  Postoperative Nausea and Vomiting: none        No notable events documented.

## 2024-08-14 ENCOUNTER — OFFICE VISIT (OUTPATIENT)
Dept: ORTHOPEDIC SURGERY | Facility: HOSPITAL | Age: 39
End: 2024-08-14
Payer: COMMERCIAL

## 2024-08-14 VITALS — BODY MASS INDEX: 28.35 KG/M2 | WEIGHT: 160 LBS | HEIGHT: 63 IN

## 2024-08-14 DIAGNOSIS — M25.511 ACUTE PAIN OF RIGHT SHOULDER: Primary | ICD-10-CM

## 2024-08-14 PROCEDURE — 1036F TOBACCO NON-USER: CPT | Performed by: PHYSICIAN ASSISTANT

## 2024-08-14 PROCEDURE — 99211 OFF/OP EST MAY X REQ PHY/QHP: CPT | Performed by: PHYSICIAN ASSISTANT

## 2024-08-14 PROCEDURE — 3008F BODY MASS INDEX DOCD: CPT | Performed by: PHYSICIAN ASSISTANT

## 2024-08-14 ASSESSMENT — PAIN SCALES - GENERAL: PAINLEVEL_OUTOF10: 6

## 2024-08-14 ASSESSMENT — PAIN - FUNCTIONAL ASSESSMENT: PAIN_FUNCTIONAL_ASSESSMENT: 0-10

## 2024-08-14 NOTE — PROGRESS NOTES
Patient returns to clinic today for first postoperative visit after right shoulder arthroscopy with biceps tenodesis and capsular release performed on 8/8/2024.  Overall she is doing well however still having a moderate amount of pain requiring narcotic pain medication.  She has therapy set up for tomorrow.    Examination: Surgical incisions healing well and surrounding erythema active drainage or signs of active infection light sensation is intact  strength 5/5 palpable distal radial pulse.    Impression: Right shoulder mild adhesive capsulitis, biceps tendinopathy    Plan: We have discussed intraoperative findings in detail today.  Avoid resisted biceps movement for the next 5 weeks.  However we discussed the importance of early active shoulder range of motion given the findings of some adhesive capsulitis.  We have discussed this in detail today she may shower and get her incisions wet.  She should wear the sling for comfort however will continue to work on shoulder range of motion specifically with therapy.  Return to our office in 3 weeks.    Yanelis Moreno PA-C  Department of Orthopaedic Surgery  [facility]    Dictation performed with the use of voice recognition software. Syntax and grammatical errors may exist.

## 2024-08-14 NOTE — PROGRESS NOTES
Physical Therapy  Physical Therapy Orthopedic Evaluation    Patient Name: Jen Franks  MRN: [id number]  Today's Date: 8/15/2024  Time Calculation  Start Time: 1330  Stop Time: 1410  Time Calculation (min): 40 min    Insurance:  Visit number: 1 of 35 (25 used this year)  Authorization info: no auth needed  Insurance Type: C[employer]a    General:  Reason for visit: Right shoulder pain s/p SLAP repair 1/11/24 and biceps tenodesis 8/8/24  Referred by: Dr. Monae    Current Problem  1. Acute pain of right shoulder  Follow Up In Physical Therapy          Precautions  STEADI Fall Risk Score (The score of 4 or more indicates an increased risk of falling): 0  Precautions Comment: PROM/AROM, no WB, no bicep strengthening for 8 weeks    Medical History Form: Reviewed (scanned into chart)    Subjective:     Chief Complaint: Patient presents to clinic with right shoulder pain s/p biceps tenodesis. Has been icing several times a day for an hour each time. Reports being told she can take sling off at home.  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     Red Flags: Do you have any of the following? No  Fever/chills, unexplained weight changes, dizziness/fainting, unexplained change in bowel or bladder functions, unexplained malaise or muscle weakness, night  pain/sweats, numbness or tingling    Objective:    Posture: FHP, rounded shoulders in sling    Palpation: TTP around R shoulder incisions     Flexibility: mod tight R pec and bicep      ROM     Shoulder PROM (Degrees)      (R)  (L)  Flexion: 95  170      Abduction: 80  170     ER at 0:  5     45     IR at 0:  To stomach To stomach            Elbow AROM (Degrees)      (R)                          (L)  Flexion: WFL                         WFL      Extension: 5 degrees from straight WFL             Strength Testing    UE strength MMT: deferred      Special Tests    Radicular Symptoms: none      Outcome Measures:  Other Measures  Other Outcome Measures: 84.09     EDUCATION: home exercise program, plan of care, activity modifications, pain management, and injury pathology       Goals: Set and discussed today  Right shoulder pain s/p SLAP repair 1/11/24 Problems       Right shoulder pain s/p SLAP repair 1/11/24 Problems (Resolved)       PT Problem       STG (Met)       Start:  01/18/24    Expected End:  04/17/24    Resolved:  08/15/24    Patient will decrease pain to 0-2/10 in 4 weeks.   Patient will increase strength by >/= 1/2 mm grade in 4 weeks.   QuickDASH: -8 in 4 weeks.               LTG (Met)       Start:  01/18/24    Expected End:  11/13/24    Resolved:  08/15/24    STG:  Patient will decrease pain to 0-2/10 in 4 weeks.   Patient will increase strength by >/= 1/2 mm grade in 4 weeks.   QuickDASH: -8 in 4 weeks.    LTG:  Patient will increase UE flexibility by 10 degrees in 8 weeks.   Patient will be able to lift > 5 lbs without increased pain in 8 weeks.  Patient will be able to reach into cupboard without increased pain in 8 weeks.                 Right shoulder pain s/p bicepts tenodesis 8/8/24 Problems       Right shoulder pain s/p bicepts tenodesis 8/8/24 Problems (Active)       PT Problem       STG:  Patient will decrease pain to 0-2/10 in 4 weeks.   Patient will increase strength by >/= 1/2 mm grade in 4  weeks.   QuickDASH: -8 in 4 weeks.    LTG:  Patient will increase UE flexibility by 10 degrees in 8 weeks.   Patient will be able to lift > 5 lbs without increased pain in 8 weeks.  Patient will be able to reach into cupboard without increased pain in 8 weeks.         PT Goals       Start:  08/15/24    Expected End:  11/13/24                   Plan of care was developed with input and agreement by the patient      Treatment Performed:  Access Code: [email or web address]    Exercises  - Flexion-Extension Shoulder Pendulum with Table Support (Mirrored)  - 2 x daily - 7 x weekly - 2 sets - 10 reps  - Circular Shoulder Pendulum with Table Support  - 2 x daily - 7 x weekly - 2 sets - 10 reps  - Horizontal Shoulder Pendulum with Table Support  - 2 x daily - 7 x weekly - 2 sets - 10 reps  - Seated Elbow Flexion and Extension AROM  - 2 x daily - 7 x weekly - 2 sets - 10 reps  - Wrist AROM Flexion Extension  - 2 x daily - 7 x weekly - 2 sets - 10 reps  - Towel Roll  with Forearm in Neutral  - 2 x daily - 7 x weekly - 2 sets - 10 reps - 10 sec hold  - Supine Shoulder External Rotation in 45 Degrees Abduction AAROM with Dowel (Mirrored)  - 2 x daily - 7 x weekly - 2 sets - 10 reps  - Supine Shoulder Press with Dowel  - 2 x daily - 7 x weekly - 2 sets - 10 reps  - Supine Shoulder Flexion Extension AAROM with Dowel  - 2 x daily - 7 x weekly - 2 sets - 10 reps    Charges: Low complexity eval, TE, Self care    Assessment: Patient presents with signs and symptoms consistent with right shoulder pain s/p bicep tenodesis, resulting in limited participation in pain-free ADLs and inability to perform at their prior level of function. Pt would benefit from physical therapy to address the impairments found & listed previously in the objective section in order to return to safe and pain-free ADLs and prior level of function.       Plan:     Planned Interventions include: therapeutic exercise, self-care home management, manual therapy, therapeutic  activities, gait training, neuromuscular coordination, vasopneumatic, dry needling, aquatic therapy  Frequency: 1-2 x Week  Duration: 12 Weeks    Penelope Ojeda, PT

## 2024-08-15 ENCOUNTER — EVALUATION (OUTPATIENT)
Dept: PHYSICAL THERAPY | Facility: CLINIC | Age: 39
End: 2024-08-15
Payer: COMMERCIAL

## 2024-08-15 DIAGNOSIS — M25.511 ACUTE PAIN OF RIGHT SHOULDER: Primary | ICD-10-CM

## 2024-08-15 DIAGNOSIS — M75.20 BICEPS TENDINITIS: ICD-10-CM

## 2024-08-15 PROCEDURE — 97535 SELF CARE MNGMENT TRAINING: CPT | Mod: GP

## 2024-08-15 PROCEDURE — 97161 PT EVAL LOW COMPLEX 20 MIN: CPT | Mod: GP

## 2024-08-15 PROCEDURE — 97110 THERAPEUTIC EXERCISES: CPT | Mod: GP

## 2024-08-15 ASSESSMENT — ENCOUNTER SYMPTOMS
LOSS OF SENSATION IN FEET: 0
OCCASIONAL FEELINGS OF UNSTEADINESS: 0
DEPRESSION: 0

## 2024-08-15 ASSESSMENT — PAIN - FUNCTIONAL ASSESSMENT: PAIN_FUNCTIONAL_ASSESSMENT: 0-10

## 2024-08-15 ASSESSMENT — PAIN SCALES - GENERAL: PAINLEVEL_OUTOF10: 5 - MODERATE PAIN

## 2024-08-19 ENCOUNTER — TREATMENT (OUTPATIENT)
Dept: PHYSICAL THERAPY | Facility: CLINIC | Age: 39
End: 2024-08-19
Payer: COMMERCIAL

## 2024-08-19 ENCOUNTER — APPOINTMENT (OUTPATIENT)
Dept: OBSTETRICS AND GYNECOLOGY | Facility: CLINIC | Age: 39
End: 2024-08-19
Payer: COMMERCIAL

## 2024-08-19 VITALS
WEIGHT: 166 LBS | SYSTOLIC BLOOD PRESSURE: 110 MMHG | DIASTOLIC BLOOD PRESSURE: 74 MMHG | HEIGHT: 63 IN | BODY MASS INDEX: 29.41 KG/M2

## 2024-08-19 DIAGNOSIS — Z30.432 ENCOUNTER FOR IUD REMOVAL: Primary | ICD-10-CM

## 2024-08-19 DIAGNOSIS — M25.511 ACUTE PAIN OF RIGHT SHOULDER: ICD-10-CM

## 2024-08-19 PROCEDURE — 3008F BODY MASS INDEX DOCD: CPT | Performed by: OBSTETRICS & GYNECOLOGY

## 2024-08-19 PROCEDURE — 97140 MANUAL THERAPY 1/> REGIONS: CPT | Mod: GP

## 2024-08-19 PROCEDURE — 58301 REMOVE INTRAUTERINE DEVICE: CPT | Performed by: OBSTETRICS & GYNECOLOGY

## 2024-08-19 PROCEDURE — 97110 THERAPEUTIC EXERCISES: CPT | Mod: GP

## 2024-08-19 ASSESSMENT — PAIN SCALES - GENERAL
PAINLEVEL: 0-NO PAIN
PAINLEVEL_OUTOF10: 4

## 2024-08-19 ASSESSMENT — ENCOUNTER SYMPTOMS
NEUROLOGICAL NEGATIVE: 0
CONSTITUTIONAL NEGATIVE: 0
HEMATOLOGIC/LYMPHATIC NEGATIVE: 0
GASTROINTESTINAL NEGATIVE: 0
RESPIRATORY NEGATIVE: 0
CARDIOVASCULAR NEGATIVE: 0
EYES NEGATIVE: 0
MUSCULOSKELETAL NEGATIVE: 0
ALLERGIC/IMMUNOLOGIC NEGATIVE: 0
PSYCHIATRIC NEGATIVE: 0
ENDOCRINE NEGATIVE: 0

## 2024-08-19 ASSESSMENT — PAIN - FUNCTIONAL ASSESSMENT: PAIN_FUNCTIONAL_ASSESSMENT: 0-10

## 2024-08-19 NOTE — PROGRESS NOTES
Routine Physical Exam    Today's Date: 8/19/2024       HPI: Jen Franks is a 39 y.o. female who presents today for annual gyn visit.      Acute complaints today:   -Pt states she was having heavy periods with cramping, so a Mirena IUD was placed in November 2023.   -Pt reports the abdominal cramps have worsened significantly after placement of IUD  -Pt states she was taking Diclofenac when cramping occurred, prescribed by Dr. Cesar.   -Cramping is still happening, rated 10/10 pain, described as intermittent.   -Pt reports that she is having light spotting occasionally, but her heavy periods have stopped.   -She would like to have the IUD removed due to the severe abdominal cramping.   -Pt states she may opt for ablation in the future.      Gyn History:  LMP: IUD in place, pt is not sure when her last period was. Pt states she is spotting lightly today.   Cramping: yes, worse after placement of IUD.     Sexual history:  Sexually active: yes, with 1 male partner  Contraception: IUD Mirena     Preventative history:  Mammogram: never done  Pap smear: last done 2/23/2021, next due 2026    Review of Systems:  GEN: Denies fever, chills  Cardio: Denies chest pain, palpitations  Pulm: Denies shortness of breath, cough, wheezing  GI: Denies abdominal pain, nausea, vomiting  : Denies dysuria, urgency, frequency, vaginal discharge, vaginal odor, vaginal dryness. Endorses abdominal cramping, worse after placement of IUD. Light vaginal spotting.   Musculoskeletal: Denies muscle aches, joint aches  Skin: Denies rashes, itching  Neuro: Denies headaches, dizziness     Allergies:   Allergies   Allergen Reactions    Egg GI Upset    Meperidine Other     PANIC ATTACK        Current Medications:     Current Outpatient Medications:     atomoxetine (Strattera) 80 mg capsule, 1 capsule (80 mg) once daily., Disp: , Rfl:     busPIRone (Buspar) 10 mg tablet, Take 1 tablet (10 mg) by mouth 3 times a day., Disp: , Rfl:     calcium carbonate  (CALTRATE 600 ORAL), Take by mouth once daily., Disp: , Rfl:     cariprazine (Vraylar) 1.5 mg capsule, Take 1 capsule (1.5 mg) by mouth once daily., Disp: , Rfl:     cetirizine (ZyrTEC) 10 mg tablet, Take 1 tablet (10 mg) by mouth once daily. MORNING, Disp: , Rfl:     diclofenac (Cataflam) 50 mg tablet, Take 1 tablet (50 mg) by mouth 3 times a day., Disp: 90 tablet, Rfl: 11    diclofenac (Voltaren) 75 mg EC tablet, Take 1 tablet (75 mg) by mouth 2 times a day. Do not crush, chew, or split., Disp: 20 tablet, Rfl: 0    fluticasone (Flonase) 50 mcg/actuation nasal spray, 2 sprays once daily as needed for rhinitis or allergies., Disp: , Rfl:     levonorgestrel (Mirena) 21 mcg/24 hr (8 yrs) 52 mg IUD, by intrauterine route., Disp: , Rfl:     omeprazole (PriLOSEC) 20 mg DR capsule, Take by mouth., Disp: , Rfl:     propranolol (Inderal) 20 mg tablet, Take 1 tablet (20 mg) by mouth once daily. EVERY MORNING FOR TREATMENT OF ANXIETY, Disp: , Rfl:     spironolactone (Aldactone) 50 mg tablet, Take 1 tablet (50 mg) by mouth 2 times a day. ACNE, Disp: , Rfl:     traZODone (Desyrel) 50 mg tablet, Take 3 tablets (150 mg) by mouth once daily at bedtime., Disp: , Rfl:      Past Medical History:  Past Medical History:   Diagnosis Date    ADHD (attention deficit hyperactivity disorder)     Adverse effect of anesthesia     Postop agitation    Anxiety     Bipolar affect, depressed (Multi)     GERD (gastroesophageal reflux disease)     Other conditions influencing health status 01/31/2017    History of frequent headaches    Other specified disorders of temporomandibular joint 01/31/2017    Temporomandibular jaw dysfunction    Sleep apnea         Past Surgical History:   Past Surgical History:   Procedure Laterality Date    KNEE Bilateral     Arthroscopy    OTHER SURGICAL HISTORY  01/31/2022    Knee surgery    SHOULDER ARTHROSCOPY Right         Family History:  No family history on file.     Social History:  Social History  "    Socioeconomic History    Marital status:      Spouse name: Not on file    Number of children: Not on file    Years of education: Not on file    Highest education level: Not on file   Occupational History    Not on file   Tobacco Use    Smoking status: Former     Current packs/day: 0.00     Types: Cigarettes     Quit date:      Years since quittin.6     Passive exposure: Never    Smokeless tobacco: Never   Vaping Use    Vaping status: Never Used   Substance and Sexual Activity    Alcohol use: Not Currently    Drug use: Not Currently    Sexual activity: Not on file   Other Topics Concern    Not on file   Social History Narrative    Not on file     Social Determinants of Health     Financial Resource Strain: Not on file   Food Insecurity: Not on file   Transportation Needs: Not on file   Physical Activity: Not on file   Stress: Not on file   Social Connections: Not on file   Intimate Partner Violence: Not on file   Housing Stability: Not on file        Objective:  Vitals: Blood pressure 110/74, height 1.6 m (5' 3\"), weight 75.3 kg (166 lb), last menstrual period 2024.  Body mass index is 29.41 kg/m².     Physical Exam:  GENERAL: Alert, oriented, well nourished, age-appropriate, pleasant, in no acute distress.   CARDIOVASCULAR: Heart with regular rate and rhythm, normal S1/S2, no murmurs  LUNGS: Clear to auscultation bilaterally without wheezing, rhonchi or rales; good respiratory effort, no increased work of breathing.  : Normal external genitalia, with no rashes, lesions, or erythema. Cervix normal in color, without discharge or bleeding. Cervical os closed. IUD strings in place. No tenderness with palpation of uterus. No cervical motion tenderness. No uterine or adnexal masses palpated.   PSYCH: Appropriate mood and affect.  SKIN: No rashes or lesions appreciated    Assessment/Plan   Diagnoses and all orders for this visit:  Encounter for IUD removal  -     IUD removed in office " successfully. IUD noted to be intact after removal. Pt tolerated well.   - Counseled pt that if she chooses endometrial ablation in the future, then pregnancy is not advised and highly not recommended due to risks. Pt understands. Pt states that her partner has a vasectomy and she does not plan to have children in the future.   - Please follow up with our office if you have any questions or concerns.     All questions answered. Patient understands and agrees to the plan. Case discussed with Dr. Cesar.    Sharad Pereira DO  PGY-1, Family Medicine  [facility]

## 2024-08-19 NOTE — PROGRESS NOTES
Patient ID: Jen Franks is a 39 y.o. female.    IUD Removal    Performed by: Bridgette Cesar MD  Authorized by: Bridgette Cesar MD    Procedure: IUD removal    Reason for removal: patient request    Strings visualized: yes    Cervix cleaned with: saline    Tenaculum applied to cervix: yes    IUD grasped by forceps: yes    IUD removed: yes    Date/Time of Removal:  8/19/2024 4:00 PM  Removed without complications: yes    IUD intact: yes    Cervix manually dilated: no

## 2024-08-19 NOTE — PROGRESS NOTES
"Physical Therapy  Physical Therapy Treatment    Patient Name: Jen Franks  MRN: [id number]  Today's Date: 8/19/2024  Time Calculation  Start Time: 1300  Stop Time: 1348  Time Calculation (min): 48 min    Insurance:  Visit number: 2 of 35 (25 used this year)  Authorization info: no auth needed  Insurance Type: Cigna (no modalities)     General:  Reason for visit: Right shoulder pain s/p SLAP repair 1/11/24 and biceps tenodesis 8/8/24  Referred by: Dr. Monae       Current Problem  1. Acute pain of right shoulder  Follow Up In Physical Therapy          Precautions  Precautions Comment: PROM/AROM, no WB, no bicep strengthening for 8 weeks    Pain Assessment: 0-10  0-10 (Numeric) Pain Score: 4    Subjective:   Patient reports shoulder has been feeling ok, no new complaints. Tries to get out of the sling at home.    HEP Performed:  Yes    * 2 weeks post-op 8/22/24    Objective:   Shoulder PROM (Degrees)                             (R)                    (L)  Flexion:            95                     170                                             Abduction:       80                     170                                 ER at 0:             5                      45                                   IR at 0:             To stomach      To stomach            Treatments:   Pulleys flex/scap 2' each  Supine shoulder AAROM with dowel flex, abd, press ups, ER, 15x2 each   Towel on wall flex 5\" hold x 10  Towel on wall circles cw/ccw x 10 B  PROM R shoulder all directions  STM to R shoulder and bicep  Ice, R shoulder, 10'    Access Code: [email or web address]    Charges: Tex2, Manx1    Assessment: Patient fatigued by progression of AAROM today but no increase in pain.      Plan: Continue to progress AROM/PROM to tolerance until cleared for strength training.    Penelope Ojeda, PT  "

## 2024-08-21 NOTE — PROGRESS NOTES
"Physical Therapy  Physical Therapy Treatment    Patient Name: Jen Franks  MRN: [id number]  Today's Date: 8/22/2024  Time Calculation  Start Time: 1610  Stop Time: 1658  Time Calculation (min): 48 min    Insurance:  Visit number: 3 of 35 (25 used this year)  Authorization info: no auth needed  Insurance Type: Cigna (no modalities)     General:  Reason for visit: Right shoulder pain s/p SLAP repair 1/11/24 and biceps tenodesis 8/8/24  Referred by: Dr. Monae       Current Problem  1. Acute pain of right shoulder  Follow Up In Physical Therapy          Precautions  Precautions Comment: PROM/AROM, no WB, no bicep strengthening for 8 weeks    Pain Assessment: 0-10  0-10 (Numeric) Pain Score: 5 - Moderate pain    Subjective:   Patient reports that shoulder has been feeling pinchy.     HEP Performed:  Yes    * 2 weeks post-op 8/22/24    Objective:   Shoulder PROM (Degrees)                             (R)                    (L)  Flexion:            105                   170                                             Abduction:       85                     170                                 ER at 0:             5                      45                                   IR at 0:             To stomach      To stomach            Treatments:   Pulleys flex/scap 3' each  Supine shoulder AAROM with dowel flex, abd, press ups, ER, 15x each   Towel on wall flex 5\" hold x 15  Towel on wall circles cw/ccw x 15 B  Standing shoulder flex, abd, IR up back, ext x 10 each- HEP  PROM R shoulder all directions  STM to R shoulder and bicep  Ice, R shoulder, 10'    Access Code: [email or web address]    Charges: Tex2, Manx1    Assessment: Patient able to tolerate progression to standing dowel exercises.      Plan: Continue to progress AROM/PROM to tolerance until cleared for strength training.    Penelope Ojeda, PT  "

## 2024-08-22 ENCOUNTER — TREATMENT (OUTPATIENT)
Dept: PHYSICAL THERAPY | Facility: CLINIC | Age: 39
End: 2024-08-22
Payer: COMMERCIAL

## 2024-08-22 DIAGNOSIS — M25.511 ACUTE PAIN OF RIGHT SHOULDER: ICD-10-CM

## 2024-08-22 PROCEDURE — 97110 THERAPEUTIC EXERCISES: CPT | Mod: GP

## 2024-08-22 PROCEDURE — 97140 MANUAL THERAPY 1/> REGIONS: CPT | Mod: GP

## 2024-08-22 ASSESSMENT — PAIN SCALES - GENERAL: PAINLEVEL_OUTOF10: 5 - MODERATE PAIN

## 2024-08-22 ASSESSMENT — PAIN - FUNCTIONAL ASSESSMENT: PAIN_FUNCTIONAL_ASSESSMENT: 0-10

## 2024-08-26 ENCOUNTER — TREATMENT (OUTPATIENT)
Dept: PHYSICAL THERAPY | Facility: CLINIC | Age: 39
End: 2024-08-26
Payer: COMMERCIAL

## 2024-08-26 ENCOUNTER — OFFICE VISIT (OUTPATIENT)
Dept: ORTHOPEDIC SURGERY | Facility: HOSPITAL | Age: 39
End: 2024-08-26
Payer: COMMERCIAL

## 2024-08-26 DIAGNOSIS — M25.511 CHRONIC RIGHT SHOULDER PAIN: Primary | ICD-10-CM

## 2024-08-26 DIAGNOSIS — G89.29 CHRONIC RIGHT SHOULDER PAIN: Primary | ICD-10-CM

## 2024-08-26 DIAGNOSIS — M25.511 ACUTE PAIN OF RIGHT SHOULDER: ICD-10-CM

## 2024-08-26 PROCEDURE — 99211 OFF/OP EST MAY X REQ PHY/QHP: CPT | Performed by: ORTHOPAEDIC SURGERY

## 2024-08-26 PROCEDURE — 97110 THERAPEUTIC EXERCISES: CPT | Mod: GP

## 2024-08-26 PROCEDURE — 97140 MANUAL THERAPY 1/> REGIONS: CPT | Mod: GP

## 2024-08-26 RX ORDER — TRAMADOL HYDROCHLORIDE 50 MG/1
50 TABLET ORAL 2 TIMES DAILY
Qty: 14 TABLET | Refills: 0 | Status: SHIPPED | OUTPATIENT
Start: 2024-08-26 | End: 2024-09-02

## 2024-08-26 ASSESSMENT — PAIN SCALES - GENERAL: PAINLEVEL_OUTOF10: 7

## 2024-08-26 ASSESSMENT — PAIN - FUNCTIONAL ASSESSMENT: PAIN_FUNCTIONAL_ASSESSMENT: 0-10

## 2024-08-26 NOTE — PROGRESS NOTES
Patient is here for reevaluation of right shoulder she is just about 2 weeks status post right shoulder capsular release and biceps tenodesis she is doing generally well but having increasing pain with increasing therapy.  She was concerned about the appearance of her incision.    Exam today confirms incision healing well no erythema induration or drainage she can be externally rotated to 60 degrees without pain.  Elevation to 100 degrees without pain.    Right shoulder adhesive capsulitis SLAP lesion.  Status post revision surgery.  Doing very well.    I see no evidence of abnormality or problems with the incision patient is having pain and as anticipated with therapy and she should continue with therapy and new prescription for tramadol was provided today she will follow-up next week.    I have personally reviewed the OARRS report for this patient.  This report is scanned into the electronic medical record.  I have considered the risks of abuse, dependence, injection and diversion.    Pain management for this patient exceeds the standard limits of 30MED, and or 7 days of treatment, this is due to severe pain associated with postoperative orthopedic procedure.    This was dictated using voice recognition software and not corrected for grammatical or spelling errors.

## 2024-08-26 NOTE — PROGRESS NOTES
"Physical Therapy  Physical Therapy Treatment    Patient Name: Jen Franks  MRN: [id number]  Today's Date: 8/26/2024  Time Calculation  Start Time: 1425  Stop Time: 1513  Time Calculation (min): 48 min    Insurance:  Visit number: 4 of 35 (25 used this year)  Authorization info: no auth needed  Insurance Type: Cigna (no modalities)     General:  Reason for visit: Right shoulder pain s/p SLAP repair 1/11/24 and biceps tenodesis 8/8/24  Referred by: Dr. Monae       Current Problem  1. Acute pain of right shoulder  Follow Up In Physical Therapy          Precautions  Precautions Comment: PROM/AROM, no WB, no bicep strengthening for 8 weeks    Pain Assessment: 0-10  0-10 (Numeric) Pain Score: 7    Subjective:   Patient reports that shoulder has been bothering her all weekend. Did accidentally push open a door and is not sure if that irritated her bicep.    HEP Performed:  Yes    * 3 weeks post-op 8/29/24    Objective:   Shoulder PROM (Degrees)                             (R)                    (L)  Flexion:            110                   170                                             Abduction:       100                     170                                 ER at 0:             5                      45                                   IR at 0:             To stomach      To stomach            Treatments:   Pulleys flex/scap 3' each  Supine shoulder AAROM with dowel flex, abd, press ups, ER, 15x each   Towel on wall flex 5\" hold x 15  Towel on wall circles cw/ccw x 15 B  Standing shoulder flex, abd, IR up back, ext x 12 each  PROM R shoulder all directions  STM to R shoulder and bicep  Ice, R shoulder, 10'    Access Code: [email or web address]    Charges: Tex2, Manx1    Assessment: Patient with decreased muscle tone following STM today.      Plan: Continue to progress AROM/PROM to tolerance until cleared for strength training.    Penelope Ojeda, PT  "

## 2024-08-28 NOTE — PROGRESS NOTES
"Physical Therapy  Physical Therapy Treatment    Patient Name: Jen Franks  MRN: [id number]  Today's Date: 8/29/2024  Time Calculation  Start Time: 1606  Stop Time: 1655  Time Calculation (min): 49 min    Insurance:  Visit number: 5 of 35 (25 used this year)  Authorization info: no auth needed  Insurance Type: Cigna (no modalities)     General:  Reason for visit: Right shoulder pain s/p SLAP repair 1/11/24 and biceps tenodesis 8/8/24  Referred by: Dr. Monae       Current Problem  1. Acute pain of right shoulder  Follow Up In Physical Therapy            Precautions  Precautions Comment: PROM/AROM, no WB, no bicep strengthening for 8 weeks    Pain Assessment: 0-10  0-10 (Numeric) Pain Score: 4    Subjective:   Patient reports that she started Tramadol which is providing some pain relief.     HEP Performed:  Yes    * 3 weeks post-op 8/29/24    Objective:   Shoulder PROM (Degrees)                             (R)                    (L)  Flexion:            140                   170                                             Abduction:       100                     170                                 ER at 0:             5                      45                                   IR at 0:             To stomach      To stomach            Treatments:   Pulleys flex/scap 3' each  Towel on wall flex 5\" hold x 15  Towel on wall circles cw/ccw x 15 B  Towel on wall abduction 5\" hold x 15   Standing shoulder dowel flex, abd, IR up back, ext x 15 each  Standing shoulder flex/scap/abd x 10 B  Supine shoulder flexion with OP from other arm 5\" hold x 5  PROM R shoulder all directions  STM to R shoulder and bicep  Grade 2-3 AP mobs R shoulder  Ice, R shoulder, 10'    Access Code: [email or web address]    Charges: Tex2, Manx1    Assessment: Patient instructed to push into resistance during ROM exercises in order to see larger gains.       Plan: Continue to progress AROM/PROM to tolerance until cleared for strength training.    Penelope Ojeda, " PT

## 2024-08-29 ENCOUNTER — TREATMENT (OUTPATIENT)
Dept: PHYSICAL THERAPY | Facility: CLINIC | Age: 39
End: 2024-08-29
Payer: COMMERCIAL

## 2024-08-29 DIAGNOSIS — M25.511 ACUTE PAIN OF RIGHT SHOULDER: ICD-10-CM

## 2024-08-29 PROCEDURE — 97110 THERAPEUTIC EXERCISES: CPT | Mod: GP

## 2024-08-29 PROCEDURE — 97140 MANUAL THERAPY 1/> REGIONS: CPT | Mod: GP

## 2024-08-29 ASSESSMENT — PAIN - FUNCTIONAL ASSESSMENT: PAIN_FUNCTIONAL_ASSESSMENT: 0-10

## 2024-08-29 ASSESSMENT — PAIN SCALES - GENERAL: PAINLEVEL_OUTOF10: 4

## 2024-08-30 NOTE — PROGRESS NOTES
"Physical Therapy  Physical Therapy Treatment    Patient Name: Jen Franks  MRN: [id number]  Today's Date: 9/3/2024  Time Calculation  Start Time: 1530  Stop Time: 1620  Time Calculation (min): 50 min    Insurance:  Visit number: 6 of 35 (25 used this year)  Authorization info: no auth needed  Insurance Type: Cigna (no modalities)     General:  Reason for visit: Right shoulder pain s/p SLAP repair 1/11/24 and biceps tenodesis 8/8/24  Referred by: Dr. Monae       Current Problem  1. Acute pain of right shoulder  Follow Up In Physical Therapy          Precautions  Precautions Comment: PROM/AROM, no WB, no bicep strengthening for 8 weeks    Pain Assessment: 0-10  0-10 (Numeric) Pain Score: 5 - Moderate pain    Subjective:   Patient reports that she is out of Tramadol and pain has increased again. Was more sedentary this weekend than usual.    HEP Performed:  Yes    *4 weeks post-op 9/5/24    Objective:   Shoulder PROM (Degrees)                             (R)                    (L)  Flexion:            140                   170                                             Abduction:       110                     170                                 ER at 0:             10                      45                                   IR at 0:             To stomach      To stomach            Treatments:   Pulleys flex/scap 3' each  Towel on wall flex 5\" hold x 15  Towel on wall circles cw/ccw x 15 B  Towel on wall abduction 5\" hold x 15   Standing shoulder dowel flex, abd, IR up back, ext x 15 each  Standing shoulder flex/scap/abd x 10 B  Supine shoulder flexion with OP from other arm 5\" hold x 5  PROM R shoulder all directions  STM to R shoulder and bicep  Grade 2-3 AP mobs R shoulder  Ice, R shoulder, 10'    Access Code: [email or web address]    Charges: Tex2, Manx1    Assessment: Patient with increased AROM once she warms up with about 5 reps. Sees MD tmrw and will find out when she can start shoulder/scapular " strengthening.      Plan: Continue to progress AROM/PROM to tolerance until cleared for strength training.    Penelope Ojeda, PT

## 2024-09-03 ENCOUNTER — TREATMENT (OUTPATIENT)
Dept: PHYSICAL THERAPY | Facility: CLINIC | Age: 39
End: 2024-09-03
Payer: COMMERCIAL

## 2024-09-03 DIAGNOSIS — M25.511 ACUTE PAIN OF RIGHT SHOULDER: ICD-10-CM

## 2024-09-03 PROCEDURE — 97140 MANUAL THERAPY 1/> REGIONS: CPT | Mod: GP

## 2024-09-03 PROCEDURE — 97110 THERAPEUTIC EXERCISES: CPT | Mod: GP

## 2024-09-03 ASSESSMENT — PAIN SCALES - GENERAL: PAINLEVEL_OUTOF10: 5 - MODERATE PAIN

## 2024-09-03 ASSESSMENT — PAIN - FUNCTIONAL ASSESSMENT: PAIN_FUNCTIONAL_ASSESSMENT: 0-10

## 2024-09-04 ENCOUNTER — OFFICE VISIT (OUTPATIENT)
Dept: ORTHOPEDIC SURGERY | Facility: HOSPITAL | Age: 39
End: 2024-09-04
Payer: COMMERCIAL

## 2024-09-04 DIAGNOSIS — M25.511 ACUTE PAIN OF RIGHT SHOULDER: Primary | ICD-10-CM

## 2024-09-04 PROCEDURE — 99211 OFF/OP EST MAY X REQ PHY/QHP: CPT | Performed by: ORTHOPAEDIC SURGERY

## 2024-09-04 RX ORDER — TRAMADOL HYDROCHLORIDE 50 MG/1
50 TABLET ORAL 2 TIMES DAILY
Qty: 4 TABLET | Refills: 0 | Status: SHIPPED | OUTPATIENT
Start: 2024-09-04 | End: 2024-09-11

## 2024-09-04 RX ORDER — DEXTROAMPHETAMINE SACCHARATE, AMPHETAMINE ASPARTATE MONOHYDRATE, DEXTROAMPHETAMINE SULFATE AND AMPHETAMINE SULFATE 1.25; 1.25; 1.25; 1.25 MG/1; MG/1; MG/1; MG/1
CAPSULE, EXTENDED RELEASE ORAL
COMMUNITY
Start: 2024-08-20

## 2024-09-04 NOTE — PROGRESS NOTES
Patient is here for follow-up 1 month status post biceps tenodesis and lysis of adhesions capsular release right shoulder.  Patient still has some pain and does require refill on tramadol.  She has been progressing with therapy.    Exam today active assisted elevation 110 degrees external rotation 40 degrees internal rotation to posterior ilium.    Motor power in abduction 4/5.    Right shoulder SLAP lesion and adhesive capsulitis.    Patient is progressing as expected.  I have given her new prescription for pain medicine she will follow-up in another month.  She will continue therapy in the interim.  Pain management for this patient exceeds the standard limits of 30MED, and or 7 days of treatment, this is due to severe pain associated with postoperative orthopedic procedure.        I have personally reviewed the OARRS report for this patient.  This report is scanned into the electronic medical record.  I have considered the risks of abuse, dependence, injection and diversion.    This was dictated using voice recognition software and not corrected for grammatical or spelling errors.

## 2024-09-05 ENCOUNTER — TELEPHONE (OUTPATIENT)
Dept: ORTHOPEDIC SURGERY | Facility: HOSPITAL | Age: 39
End: 2024-09-05

## 2024-09-05 ENCOUNTER — TREATMENT (OUTPATIENT)
Dept: PHYSICAL THERAPY | Facility: CLINIC | Age: 39
End: 2024-09-05
Payer: COMMERCIAL

## 2024-09-05 DIAGNOSIS — S43.431D SUPERIOR GLENOID LABRUM LESION OF RIGHT SHOULDER, SUBSEQUENT ENCOUNTER: ICD-10-CM

## 2024-09-05 DIAGNOSIS — M25.511 ACUTE PAIN OF RIGHT SHOULDER: ICD-10-CM

## 2024-09-05 PROCEDURE — 97110 THERAPEUTIC EXERCISES: CPT | Mod: GP

## 2024-09-05 PROCEDURE — 97140 MANUAL THERAPY 1/> REGIONS: CPT | Mod: GP

## 2024-09-05 ASSESSMENT — PAIN - FUNCTIONAL ASSESSMENT: PAIN_FUNCTIONAL_ASSESSMENT: 0-10

## 2024-09-05 ASSESSMENT — PAIN SCALES - GENERAL: PAINLEVEL_OUTOF10: 4

## 2024-09-05 NOTE — PROGRESS NOTES
"Physical Therapy  Physical Therapy Treatment    Patient Name: Jen Franks  MRN: [id number]  Today's Date: 9/5/2024  Time Calculation  Start Time: 1530  Stop Time: 1620  Time Calculation (min): 50 min    Insurance:  Visit number: 7 of 35 (25 used this year)  Authorization info: no auth needed  Insurance Type: Cigna (no modalities)     General:  Reason for visit: Right shoulder pain s/p SLAP repair 1/11/24 and biceps tenodesis 8/8/24  Referred by: Dr. Monae       Current Problem  1. Acute pain of right shoulder  Follow Up In Physical Therapy          Precautions  Precautions Comment: can start shoulder/scapular strengthening    Pain Assessment: 0-10  0-10 (Numeric) Pain Score: 4    Subjective:   Patient reports that she saw Dr. Monae and was cleared to start shoulder strengthening.     HEP Performed:  Yes    *4 weeks post-op 9/5/24    Objective:   Shoulder PROM (Degrees)                             (R)                    (L)  Flexion:            140                   170                                             Abduction:       125                     170                                 ER at 0:             10                      45                                   IR at 0:             To stomach      To stomach            Treatments:   Pulleys flex/scap 3' each  Standing shoulder flex/scap/abd x 15 B  Towel on wall flex 5\" hold x 15  Towel on wall circles cw/ccw x 15 B  Towel on wall abduction 5\" hold x 15   Shoulder IR stretch behind back 15\" x 4  S/L shoulder ER 15x2  S/L shoulder abduction 15x2  Standing shoulder dowel flex, abd, IR up back, ext x 15 each  Supine shoulder flexion with OP from other arm 5\" hold x 10  PROM R shoulder all directions  STM to R shoulder and bicep  Grade 2-3 AP mobs R shoulder  Ice, R shoulder, 10'    Access Code: [email or web address]    Charges: Tex2, Manx1    Assessment: Patient able to tolerate S/L ER and abduction so will add to HEP.      Plan: Continue to progress AROM and " shoulder/scapular strengthening to tolerance.     Penelope Ojeda, PT

## 2024-09-05 NOTE — TELEPHONE ENCOUNTER
Patient reports the Tramadol prescription written yesterday by Dr Monae is being held at pharmacy for clarification.  It was written for #4 (not #14) but instruction were for 1 tablet every 12 hours for 7 days.  Pharmacy will not fill until new prescription is sent with quantity and clear instructions for the 7 days.  Please advise. Pharmacy confirmed in chart

## 2024-09-06 RX ORDER — TRAMADOL HYDROCHLORIDE 50 MG/1
50 TABLET ORAL EVERY 12 HOURS
Qty: 14 TABLET | Refills: 0 | Status: SHIPPED | OUTPATIENT
Start: 2024-09-06 | End: 2024-09-13

## 2024-09-09 ENCOUNTER — APPOINTMENT (OUTPATIENT)
Dept: OBSTETRICS AND GYNECOLOGY | Facility: CLINIC | Age: 39
End: 2024-09-09
Payer: COMMERCIAL

## 2024-09-09 VITALS
WEIGHT: 160 LBS | DIASTOLIC BLOOD PRESSURE: 66 MMHG | HEIGHT: 63 IN | BODY MASS INDEX: 28.35 KG/M2 | SYSTOLIC BLOOD PRESSURE: 110 MMHG

## 2024-09-09 DIAGNOSIS — N93.9 ABNORMAL UTERINE BLEEDING (AUB): Primary | ICD-10-CM

## 2024-09-09 DIAGNOSIS — Z01.818 PREOP EXAMINATION: ICD-10-CM

## 2024-09-09 PROCEDURE — 99213 OFFICE O/P EST LOW 20 MIN: CPT | Performed by: OBSTETRICS & GYNECOLOGY

## 2024-09-09 PROCEDURE — 3008F BODY MASS INDEX DOCD: CPT | Performed by: OBSTETRICS & GYNECOLOGY

## 2024-09-09 ASSESSMENT — PAIN SCALES - GENERAL: PAINLEVEL: 0-NO PAIN

## 2024-09-09 NOTE — PROGRESS NOTES
Subjective   Patient ID: Jen Franks is a 39 y.o. female who presents for Pre-op Exam (Pre op for ablation.).  Wants to go forward with uterine ablation due to heavy menstrual bleeding and cramping.  She is doing well since IUD removal last month with reduced abdominal pain.  Pregnancy post ablation was discussed with patient, she understands the importance of prevention and states that her partner has had a vasectomy.  She underwent arthroscopic shoulder surgery in August and did well with the anesthesia. She only endorsed some nausea afterwards but was able to leave the hospital the same day. She denies any history of heart disease and her blood pressure is under control.      Review of Systems  All pertinent positive symptoms are included in the history of present illness.  All other systems have been reviewed and are negative and noncontributory to this patient's current ailments.    Objective   Physical Exam  Constitutional:       General: She is not in acute distress.     Appearance: Normal appearance. She is not ill-appearing.   HENT:      Head: Normocephalic and atraumatic.      Right Ear: External ear normal.      Left Ear: External ear normal.   Eyes:      General:         Right eye: No discharge.         Left eye: No discharge.      Extraocular Movements: Extraocular movements intact.      Conjunctiva/sclera: Conjunctivae normal.   Pulmonary:      Effort: No respiratory distress.   Musculoskeletal:      Cervical back: Normal range of motion.      Right lower leg: No edema.      Left lower leg: No edema.   Skin:     General: Skin is warm and dry.   Neurological:      General: No focal deficit present.      Mental Status: She is alert. Mental status is at baseline.   Psychiatric:         Mood and Affect: Mood normal.         Behavior: Behavior normal.         Thought Content: Thought content normal.         Assessment/Plan   Diagnoses and all orders for this visit:  Abnormal uterine bleeding (AUB)  -      Case Request Operating Room: Hysteroscopy with Ablation Tissue  Preop examination  Comments:  Patient medically optimized for uterine ablation procedure. Consent signed today and scheduling will be in order.    Scheduling will reach out to you.  We advise having another person present post-op to transport you home.  No food or beverage after midnight before surgery date.       Alex Santana DO, PGY1   Family Medicine  09/09/24 10:41 AM

## 2024-09-09 NOTE — PROGRESS NOTES
"Physical Therapy  Physical Therapy Treatment    Patient Name: Jen Franks  MRN: [id number]  Today's Date: 9/10/2024  Time Calculation  Start Time: 1535  Stop Time: 1625  Time Calculation (min): 50 min    Insurance:  Visit number: 8 of 35 (25 used this year)  Authorization info: no auth needed  Insurance Type: Cigna (no modalities)     General:  Reason for visit: Right shoulder pain s/p SLAP repair 1/11/24 and biceps tenodesis 8/8/24  Referred by: Dr. Monae       Current Problem  1. Acute pain of right shoulder  Follow Up In Physical Therapy          Precautions  Precautions Comment: can start shoulder/scapular strengthening    Pain Assessment: 0-10  0-10 (Numeric) Pain Score: 7    Subjective:   Patient reports that her bicep has been bothering her since yesterday and she isn't sure why. It hurts when she reaches away from body especially out to the side.    HEP Performed:  Yes    *5 weeks post-op 9/12/24    Objective:   Shoulder PROM (Degrees)                             (R)                    (L)  Flexion:            140                   170                                             Abduction:       125                     170                                 ER at 0:             10                      45                                   IR at 0:             To stomach      To stomach            Treatments:   Pulleys flex/scap 3' each  Supine dowel flex,press up, ER 5\" hold x 15  Standing shoulder flex/scap/abd x 15 B  Standing shoulder dowel flex, abd, IR up back, ext x 15 each  S/L shoulder ER 15x2  S/L shoulder abduction 15x2   Supine shoulder flexion with OP from other arm 5\" hold x 10  PROM R shoulder all directions  STM to R shoulder and bicep  Grade 2-3 AP mobs R shoulder   Towel on wall flex 5\" hold x 15  Ice, R shoulder, 10'    Access Code: [email or web address]    Charges: Tex2, Manx1    Assessment: Patient with decreased pain after STM but limited exercises due to increased pain overall today. Advised to ice, " stretch and massage bicep/shoulder the next few days.       Plan: Continue to progress AROM and shoulder/scapular strengthening to tolerance.     Penelope Ojeda, PT

## 2024-09-10 ENCOUNTER — TREATMENT (OUTPATIENT)
Dept: PHYSICAL THERAPY | Facility: CLINIC | Age: 39
End: 2024-09-10
Payer: COMMERCIAL

## 2024-09-10 DIAGNOSIS — M25.511 ACUTE PAIN OF RIGHT SHOULDER: ICD-10-CM

## 2024-09-10 PROCEDURE — 97140 MANUAL THERAPY 1/> REGIONS: CPT | Mod: GP

## 2024-09-10 PROCEDURE — 97110 THERAPEUTIC EXERCISES: CPT | Mod: GP

## 2024-09-10 ASSESSMENT — PAIN - FUNCTIONAL ASSESSMENT: PAIN_FUNCTIONAL_ASSESSMENT: 0-10

## 2024-09-10 ASSESSMENT — PAIN SCALES - GENERAL: PAINLEVEL_OUTOF10: 7

## 2024-09-11 NOTE — PROGRESS NOTES
"Physical Therapy  Physical Therapy Treatment    Patient Name: Jen Franks  MRN: [id number]  Today's Date: 9/12/2024  Time Calculation  Start Time: 1540  Stop Time: 1630  Time Calculation (min): 50 min    Insurance:  Visit number: 9 of 35 (25 used this year)  Authorization info: no auth needed  Insurance Type: Cigna (no modalities)     General:  Reason for visit: Right shoulder pain s/p SLAP repair 1/11/24 and biceps tenodesis 8/8/24  Referred by: Dr. Monae       Current Problem  1. Acute pain of right shoulder  Follow Up In Physical Therapy        Precautions  Precautions Comment: can start shoulder/scapular strengthening    Pain Assessment: 0-10  0-10 (Numeric) Pain Score: 5 - Moderate pain    Subjective:   Patient reports that her bicep is feeling better than last visit but still painful in scaption.     HEP Performed:  Yes    *5 weeks post-op 9/12/24    Objective:   Shoulder PROM (Degrees)                             (R)                    (L)  Flexion:            140                   170                                             Abduction:       125                     170                                 ER at 0:             10                      45                                   IR at 0:             To stomach      To stomach            Treatments:   Pulleys flex/scap 3' each  Standing shoulder flex/scap/abd x 15 B  Standing shoulder dowel flex, abd, IR up back, ext x 15 each  Towel on wall flex 5\" hold x 15  Towel on wall circles cw/ccw x 15 B  S/L shoulder ER 15x2 (1#)   S/L shoulder abduction 15x2 (1#)   Supine serratus punches 15x2 (1#)  Supine shoulder flexion with OP from other arm 5\" hold x 10  PROM R shoulder all directions  STM to R shoulder and bicep  Grade 2-3 AP mobs R shoulder   Ice, R shoulder, 10'    Access Code: [email or web address]    Charges: Tex2, Manx1    Assessment: Patient with most pain during scaption today. AROM abduction wasn't painful like last visit. Advised that pulling sensation is " ok but no sharp pain.      Plan: Continue to progress AROM and shoulder/scapular strengthening to tolerance.     Penelope Ojeda, PT

## 2024-09-12 ENCOUNTER — TREATMENT (OUTPATIENT)
Dept: PHYSICAL THERAPY | Facility: CLINIC | Age: 39
End: 2024-09-12
Payer: COMMERCIAL

## 2024-09-12 DIAGNOSIS — Z01.818 PREOP TESTING: ICD-10-CM

## 2024-09-12 DIAGNOSIS — M25.511 ACUTE PAIN OF RIGHT SHOULDER: ICD-10-CM

## 2024-09-12 PROBLEM — N93.9 ABNORMAL UTERINE BLEEDING (AUB): Status: ACTIVE | Noted: 2024-09-09

## 2024-09-12 PROCEDURE — 97140 MANUAL THERAPY 1/> REGIONS: CPT | Mod: GP

## 2024-09-12 PROCEDURE — 97110 THERAPEUTIC EXERCISES: CPT | Mod: GP

## 2024-09-12 ASSESSMENT — PAIN SCALES - GENERAL: PAINLEVEL_OUTOF10: 5 - MODERATE PAIN

## 2024-09-12 ASSESSMENT — PAIN - FUNCTIONAL ASSESSMENT: PAIN_FUNCTIONAL_ASSESSMENT: 0-10

## 2024-09-13 ENCOUNTER — TELEPHONE (OUTPATIENT)
Dept: ORTHOPEDIC SURGERY | Facility: HOSPITAL | Age: 39
End: 2024-09-13
Payer: COMMERCIAL

## 2024-09-13 NOTE — LETTER
September 13, 2024     Patient: Jen Fransk   YOB: 1985           To Whom It May Concern:    It is my medical opinion that Jen Franks may return to full duty on 9/18/2024 with no restrictions.    If you have any questions or concerns, please don't hesitate to call.         Sincerely,    Tesfaye Monae M.D.  [facility] [place]

## 2024-09-13 NOTE — TELEPHONE ENCOUNTER
Jen asked for a RTW 9/18/2024.  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 She thinks she can return safely doing this now. Letter in chart faxed to [phone] at patient request.

## 2024-09-16 NOTE — PROGRESS NOTES
Physical Therapy  Physical Therapy Orthopedic Progress Note    Patient Name: Jen Franks  MRN: [id number]  Today's Date: 9/17/2024  Time Calculation  Start Time: 1545  Stop Time: 1635  Time Calculation (min): 50 min    Insurance:  Visit number: 10 of 35 (25 used this year)  Authorization info: no auth needed  Insurance Type: Cigna (no modalities)     General:  Reason for visit: Right shoulder pain s/p SLAP repair 1/11/24 and biceps tenodesis 8/8/24  Referred by: Dr. Monae    Current Problem  1. Acute pain of right shoulder  Follow Up In Physical Therapy          Precautions  Precautions Comment: can start shoulder/scapular strengthening      Subjective:    Patient reports that she is still unable to carry heavy objects, pushing/pullings, open a jar, scooping litter box due to shoulder pain. Is going back to work tmrw.    Current Condition:   Better    Pain:  Pain Assessment: 0-10    Self Reported Function (0-100%) = 75%  (100% being back to PLOF)    Objective:    Posture: FHP, rounded shoulders     Palpation: TTP around R biceps     Flexibility: min tight R pec and bicep        ROM                Shoulder PROM (Degrees)                             (R)                    (L)  Flexion:            140                   170                                             Abduction:       125                     170                                 ER at 0:             10                      45                                   IR at 0:             To stomach      To stomach                                                                   UE AROM                  R                 L    Shoulder flexion   130 deg. 160 deg.  Shoulder abduction              115 deg. 160 deg.  Shoulder functional ER            C7            T1   Shoulder functional IR              T10        T4     Elbow AROM (Degrees)                             (R)                                            (L)  Flexion:            WFL                     "                     WFL                                            Extension:        WFL                                          WFL                                       Strength Testing    UE strength MMT  R L  Shoulder flexion  3+/5 4/5  Shoulder abduction             3+/5 4/5  Shoulder ER               3+/5 4/5  Shoulder IR               3+/5 4/5  Elbow flexion               4/5 5/5  Elbow extension   4/5 5/5        Outcome Measures: Updated 9/17/2024  Other Measures  Other Outcome Measures: 45.45     EDUCATION: home exercise program, plan of care, activity modifications, pain management, and injury pathology       Goals: Updated 9/17/2024- progressing  Active       PT Problem       STG:  Patient will decrease pain to 0-2/10 in 4 weeks.   Patient will increase strength by >/= 1/2 mm grade in 4 weeks.   QuickDASH: -8 in 4 weeks.    LTG:  Patient will increase UE flexibility by 10 degrees in 8 weeks.   Patient will be able to lift > 5 lbs without increased pain in 8 weeks.  Patient will be able to reach into cupboard without increased pain in 8 weeks.         PT Goals       Start:  08/15/24    Expected End:  11/13/24                Plan of care was developed with input and agreement by the patient      Treatment Performed:  Pulleys flex/scap 3' each  Recheck completed   Standing shoulder flex/scap/abd x 15 B (1#)  Alt shoulder punches 1#, 15x2 B  GTB rows with scap retraction 5\" hold, 12x2- HEP  GTB shoulder ext, 10x2- HEP  GTB ER/IR 10x2 each- HEP  S/L shoulder ER 15x2 (1#)- not today  S/L shoulder abduction 15x2 (1#)- not today   Supine serratus punches 15x2 (1#)- not today  Supine shoulder flexion with OP from other arm 5\" hold x 10  PROM R shoulder all directions  STM to R shoulder and bicep  Grade 2-3 AP mobs R shoulder   Ice, R shoulder, 10'     Access Code: [email or web address]     Charges: Tex2, Manx1      Assessment: Patient has demonstrated significant improvement in both PROM and AROM and is now starting to " progress light strengthening to tolerance. Still getting sharp pains occasionally when reaching away from body. Will continue to progress protocol to tolerance for return to PLOF.      Plan: Updated 9/17/2024    Planned Interventions include: therapeutic exercise, self-care home management, manual therapy, therapeutic activities, gait training, neuromuscular coordination, vasopneumatic, dry needling, aquatic therapy  Frequency: 1 x Week  Duration: 8 Weeks    Penelope Ojeda, PT

## 2024-09-17 ENCOUNTER — TREATMENT (OUTPATIENT)
Dept: PHYSICAL THERAPY | Facility: CLINIC | Age: 39
End: 2024-09-17
Payer: COMMERCIAL

## 2024-09-17 ENCOUNTER — DOCUMENTATION (OUTPATIENT)
Dept: OBSTETRICS AND GYNECOLOGY | Facility: CLINIC | Age: 39
End: 2024-09-17
Payer: COMMERCIAL

## 2024-09-17 DIAGNOSIS — M25.511 ACUTE PAIN OF RIGHT SHOULDER: ICD-10-CM

## 2024-09-17 PROCEDURE — 97140 MANUAL THERAPY 1/> REGIONS: CPT | Mod: GP

## 2024-09-17 PROCEDURE — 97110 THERAPEUTIC EXERCISES: CPT | Mod: GP

## 2024-09-17 ASSESSMENT — PAIN - FUNCTIONAL ASSESSMENT: PAIN_FUNCTIONAL_ASSESSMENT: 0-10

## 2024-09-17 NOTE — PROGRESS NOTES
Contacted pt  Name and  verified  Information reviewed below:    Patient scheduled for surgery: Hysteroscopy with Ablation Tissue   Location: [facility]  Date: 2024 Time: TBD arrive at hospital 2 hours before procedure time  Labs/testing: Type and screen & CBC 2024-11/10/2024 any  lab    Surgery scheduling provided to patient/mailed/emailed the following:  · Getting Ready for Surgery Letter   · [facility] Recovery Approach Brochure  · Gynecologic Surgery Handout SP# [id number]    Pt informed to have the labs done within 72 hours of surgery and No food or drink by mouth after midnight the night before surgery until after the surgery is completed, take a shower in the morning and don't put any lotions, oils, perfumes or deodorant on. Arrive at the hospital 2 hours before your scheduled time.    Patient scheduled for post-operative visit 2024 @1045 am Tameka Cesar

## 2024-09-25 ENCOUNTER — OFFICE VISIT (OUTPATIENT)
Dept: ORTHOPEDIC SURGERY | Facility: HOSPITAL | Age: 39
End: 2024-09-25
Payer: COMMERCIAL

## 2024-09-25 DIAGNOSIS — S43.431D SUPERIOR GLENOID LABRUM LESION OF RIGHT SHOULDER, SUBSEQUENT ENCOUNTER: Primary | ICD-10-CM

## 2024-09-25 PROCEDURE — 99211 OFF/OP EST MAY X REQ PHY/QHP: CPT | Performed by: ORTHOPAEDIC SURGERY

## 2024-09-25 NOTE — PROGRESS NOTES
The patient is now doing much better with regard to her right shoulder still has some restriction of motion but pain is almost gone.    Right shoulder elevation 150 external rotation 45/70 internal rotation PSIS/T12.  Motor power in abduction 5/5.    Right shoulder SLAP lesion and adhesive capsulitis    Patient is doing well after biceps tenodesis and capsular release.  She should continue with her therapeutic exercises she should have a 20 pound lifting restriction for about another month and then follow-up with me in 2 months.    This was dictated using voice recognition software and not corrected for grammatical or spelling errors.

## 2024-09-26 ENCOUNTER — TREATMENT (OUTPATIENT)
Dept: PHYSICAL THERAPY | Facility: CLINIC | Age: 39
End: 2024-09-26
Payer: COMMERCIAL

## 2024-09-26 DIAGNOSIS — M25.511 ACUTE PAIN OF RIGHT SHOULDER: ICD-10-CM

## 2024-09-26 PROCEDURE — 97110 THERAPEUTIC EXERCISES: CPT | Mod: GP

## 2024-09-26 PROCEDURE — 97140 MANUAL THERAPY 1/> REGIONS: CPT | Mod: GP

## 2024-09-26 ASSESSMENT — PAIN SCALES - GENERAL: PAINLEVEL_OUTOF10: 3

## 2024-09-26 ASSESSMENT — PAIN - FUNCTIONAL ASSESSMENT: PAIN_FUNCTIONAL_ASSESSMENT: 0-10

## 2024-09-26 NOTE — PROGRESS NOTES
"Physical Therapy  Physical Therapy Treatment    Patient Name: Jen Franks  MRN: [id number]  Today's Date: 9/26/2024  Time Calculation  Start Time: 1545  Stop Time: 1635  Time Calculation (min): 50 min    Insurance:  Visit number: 11 of 35 (25 used this year)  Authorization info: no auth needed  Insurance Type: Cigna (no modalities)     General:  Reason for visit: Right shoulder pain s/p SLAP repair 1/11/24 and biceps tenodesis 8/8/24  Referred by: Dr. Monae       Current Problem  1. Acute pain of right shoulder  Follow Up In Physical Therapy          Precautions  Precautions Comment: 20# lifting restriction    Pain Assessment: 0-10  0-10 (Numeric) Pain Score: 3    Subjective:   Patient reports that she saw MD who cleared her to 20# lifting restriction. Shoulder has been feeling fine with return to work.    HEP Performed:  Yes    *7 weeks post-op 9/26/24    Objective:   Shoulder PROM (Degrees)                             (R)                    (L)  Flexion:            160                   170                                             Abduction:       155                     170                                 ER at 0:             30                     45                                   IR at 0:             To stomach      To stomach            Treatments:   UBE F/R, L5 3' each   Standing shoulder flex/scap/abd x 15 B (2#)  RUE diagonals 2#, 10x  Alt shoulder punches 2#, 15x2 B  Bravo rows with scap retraction 5\" hold, 15x2 (7.5#)  Bravo shoulder ext, 15x2 (5#)  Bravo ER/IR 10x2 each (2.5/5#)  S/L shoulder ER 15x2 (2#)  S/L shoulder abduction 15x2 (2#)  Supine serratus punches 15x2 (2#)  PROM R shoulder all directions  STM to R shoulder and bicep  Grade 2-3 AP mobs R shoulder   Ice, R shoulder, 10'     Access Code: [email or web address]     Charges: Tex2, Manx1    Assessment: Patient with muscle fatigue during strengthening with increased weight today. Advised to increase resistance at home.      Plan: Continue to " progress AROM and shoulder/scapular strengthening to tolerance.     Penelope Ojeda, PT

## 2024-09-30 NOTE — PROGRESS NOTES
"Physical Therapy  Physical Therapy Treatment    Patient Name: Jen Franks  MRN: [id number]  Today's Date: 10/1/2024  Time Calculation  Start Time: 0755  Stop Time: 0845  Time Calculation (min): 50 min    Insurance:  Visit number: 12 of 35 (25 used this year)  Authorization info: no auth needed  Insurance Type: Cigna (no modalities)     General:  Reason for visit: Right shoulder pain s/p SLAP repair 1/11/24 and biceps tenodesis 8/8/24  Referred by: Dr. Monae       Current Problem  1. Acute pain of right shoulder  Follow Up In Physical Therapy          Precautions  Precautions Comment: 20# lifting restriction    Pain Assessment: 0-10  0-10 (Numeric) Pain Score: 1    Subjective:   Patient reports that she has been avoiding heavy lifting but otherwise is doing normal daily activities. Has been able to knit more.    HEP Performed:  Yes    *8 weeks post-op 10/3/24    Objective:   Shoulder PROM (Degrees)                             (R)                    (L)  Flexion:            160                   170                                             Abduction:       160                     170                                 ER at 0:             35                     45                                   IR at 0:             To stomach      To stomach            Treatments:   UBE F/R, L5 3' each   Bravo diagonals 4 directions, 2.5#, 10x each  Standing shoulder flex/scap/abd x 15 B (3#)  S/L shoulder ER 15x2 (3#)  S/L shoulder abduction 15x2 (3#)  Alt shoulder punches 3#, 15x2 B  Wall push ups 10x2  Bravo rows with scap retraction 5\" hold, 15x2 (7.5#)  Bravo shoulder ext, 15x2 (5#)  GTB ER/IR 15x2 each  Ice, R shoulder, 10'     Access Code: [email or web address]     Charges: Tex3    Assessment: Patient able to increase strengthening intensity today so advised to use 3# at home with exercises.      Plan: Continue to progress AROM and shoulder/scapular strengthening to tolerance.     Penelope Ojeda, PT  "

## 2024-10-01 ENCOUNTER — TREATMENT (OUTPATIENT)
Dept: PHYSICAL THERAPY | Facility: CLINIC | Age: 39
End: 2024-10-01
Payer: COMMERCIAL

## 2024-10-01 DIAGNOSIS — M25.511 ACUTE PAIN OF RIGHT SHOULDER: ICD-10-CM

## 2024-10-01 PROCEDURE — 97110 THERAPEUTIC EXERCISES: CPT | Mod: GP

## 2024-10-01 ASSESSMENT — PAIN SCALES - GENERAL: PAINLEVEL_OUTOF10: 1

## 2024-10-01 ASSESSMENT — PAIN - FUNCTIONAL ASSESSMENT: PAIN_FUNCTIONAL_ASSESSMENT: 0-10

## 2024-10-07 NOTE — PROGRESS NOTES
"Physical Therapy  Physical Therapy Treatment    Patient Name: Jen Franks  MRN: [id number]  Today's Date: 10/8/2024  Time Calculation  Start Time: 0752  Stop Time: 0843  Time Calculation (min): 51 min    Insurance:  Visit number: 13 of 35 (25 used this year)  Authorization info: no auth needed  Insurance Type: Cigna (no modalities)     General:  Reason for visit: Right shoulder pain s/p SLAP repair 1/11/24 and biceps tenodesis 8/8/24  Referred by: Dr. Monae       Current Problem  1. Acute pain of right shoulder  Follow Up In Physical Therapy          Precautions  Precautions Comment: 20# lifting restriction    Pain Assessment: 0-10  0-10 (Numeric) Pain Score: 1    Subjective:   Patient reports some muscle soreness but it hasn't been stopping her from completing exercises.     HEP Performed:  Yes    *9 weeks post-op 10/10/24    Objective:   Shoulder PROM (Degrees)                             (R)                    (L)  Flexion:            160                   170                                             Abduction:       160                   170                                 ER at 0:             35                     45                                   IR at 0:             To stomach      To stomach            Treatments:   UBE F/R, L5 3' each   Bravo diagonals 4 directions, 2.5-5#, 12x each  Bravo rows with scap retraction 5\" hold, 15x2 (7.5#)  Bravo shoulder ext, 12x2 (7.5#)  Bravo ER (2.5#)/IR(5#) 15x2 each  Standing shoulder flex/scap/abd x 15 B (3#)  Alt shoulder punches 3#, 15x2 B   S/L shoulder ER 15x2 (3#)  S/L shoulder abduction 15x2 (3#)  Incline push ups 10x2  Ice, R shoulder, 10'     Access Code: [email or web address]     Charges: Tex3    Assessment: Patient with muscle fatigue during incline push ups but no pain.    Plan: Continue to progress AROM and shoulder/scapular strengthening to tolerance.     Penelope Ojeda, PT  "

## 2024-10-08 ENCOUNTER — TREATMENT (OUTPATIENT)
Dept: PHYSICAL THERAPY | Facility: CLINIC | Age: 39
End: 2024-10-08
Payer: COMMERCIAL

## 2024-10-08 DIAGNOSIS — M25.511 ACUTE PAIN OF RIGHT SHOULDER: ICD-10-CM

## 2024-10-08 PROCEDURE — 97110 THERAPEUTIC EXERCISES: CPT | Mod: GP

## 2024-10-08 ASSESSMENT — PAIN - FUNCTIONAL ASSESSMENT: PAIN_FUNCTIONAL_ASSESSMENT: 0-10

## 2024-10-08 ASSESSMENT — PAIN SCALES - GENERAL: PAINLEVEL_OUTOF10: 1

## 2024-10-16 NOTE — PROGRESS NOTES
"Physical Therapy  Physical Therapy Treatment    Patient Name: Jen Franks  MRN: [id number]  Today's Date: 10/17/2024  Time Calculation  Start Time: 0400  Stop Time: 0448  Time Calculation (min): 48 min    Insurance:  Visit number: 14 of 35 (25 used this year)  Authorization info: no auth needed  Insurance Type: [employer] (no modalities)     General:  Reason for visit: Right shoulder pain s/p SLAP repair 1/11/24 and biceps tenodesis 8/8/24  Referred by: Dr. Monae       Current Problem  1. Acute pain of right shoulder  Follow Up In Physical Therapy            Precautions  Precautions Comment: 20# lifting restriction    Pain Assessment: 0-10  0-10 (Numeric) Pain Score: 0 - No pain    Subjective:   Patient reports that shoulder has been feeling good.    HEP Performed:  Yes    *10 weeks post-op 10/17/24    Objective:   Shoulder PROM (Degrees)                             (R)                    (L)  Flexion:            160                   170                                             Abduction:       160                   170                                 ER at 0:             35                     45                                   IR at 0:             To stomach      To stomach            Treatments:   Elliptical, L2, 5'   Bravo diagonals 4 directions, 2.5-7.5#, 15x each  Bravo rows with scap retraction 5\" hold, 15x2 (10#)  Bravo shoulder ext, 12x2 (7.5#)  Bravo ER (2.5#)/IR(5#) 15x2 each  Standing shoulder flex/scap/abd x 15 B (4#)  Alt shoulder punches 4#, 15x2 B   RTB shoulder inchworms on wall 20 ft x 2- HEP  RTB shoulder star excursions x 5- HEP  Forearm plank 15\" x 4- HEP  Ice, R shoulder, 10'     Access Code: [email or web address]     Charges: [place]    Assessment: Patient fatigued by forearm planks so added to HEP    Plan: Continue to progress AROM and shoulder/scapular strengthening to tolerance.     Penelope Ojeda, PT  "

## 2024-10-17 ENCOUNTER — TREATMENT (OUTPATIENT)
Dept: PHYSICAL THERAPY | Facility: CLINIC | Age: 39
End: 2024-10-17
Payer: COMMERCIAL

## 2024-10-17 DIAGNOSIS — M25.511 ACUTE PAIN OF RIGHT SHOULDER: ICD-10-CM

## 2024-10-17 PROCEDURE — 97110 THERAPEUTIC EXERCISES: CPT | Mod: GP

## 2024-10-17 ASSESSMENT — PAIN SCALES - GENERAL: PAINLEVEL_OUTOF10: 0 - NO PAIN

## 2024-10-17 ASSESSMENT — PAIN - FUNCTIONAL ASSESSMENT: PAIN_FUNCTIONAL_ASSESSMENT: 0-10

## 2024-10-24 ENCOUNTER — TREATMENT (OUTPATIENT)
Dept: PHYSICAL THERAPY | Facility: CLINIC | Age: 39
End: 2024-10-24
Payer: COMMERCIAL

## 2024-10-24 DIAGNOSIS — M25.511 ACUTE PAIN OF RIGHT SHOULDER: ICD-10-CM

## 2024-10-24 PROCEDURE — 97110 THERAPEUTIC EXERCISES: CPT | Mod: GP

## 2024-10-24 ASSESSMENT — PAIN - FUNCTIONAL ASSESSMENT: PAIN_FUNCTIONAL_ASSESSMENT: 0-10

## 2024-10-24 ASSESSMENT — PAIN SCALES - GENERAL: PAINLEVEL_OUTOF10: 0 - NO PAIN

## 2024-10-25 ENCOUNTER — CLINICAL SUPPORT (OUTPATIENT)
Dept: PREADMISSION TESTING | Facility: HOSPITAL | Age: 39
End: 2024-10-25
Payer: COMMERCIAL

## 2024-10-29 ENCOUNTER — TREATMENT (OUTPATIENT)
Dept: PHYSICAL THERAPY | Facility: CLINIC | Age: 39
End: 2024-10-29
Payer: COMMERCIAL

## 2024-10-29 DIAGNOSIS — M25.511 ACUTE PAIN OF RIGHT SHOULDER: ICD-10-CM

## 2024-10-29 PROCEDURE — 97110 THERAPEUTIC EXERCISES: CPT | Mod: GP

## 2024-10-29 ASSESSMENT — PAIN - FUNCTIONAL ASSESSMENT: PAIN_FUNCTIONAL_ASSESSMENT: 0-10

## 2024-10-29 ASSESSMENT — PAIN SCALES - GENERAL: PAINLEVEL_OUTOF10: 0 - NO PAIN

## 2024-11-01 ENCOUNTER — PRE-ADMISSION TESTING (OUTPATIENT)
Dept: PREADMISSION TESTING | Facility: HOSPITAL | Age: 39
End: 2024-11-01
Payer: COMMERCIAL

## 2024-11-01 VITALS
OXYGEN SATURATION: 98 % | SYSTOLIC BLOOD PRESSURE: 116 MMHG | DIASTOLIC BLOOD PRESSURE: 83 MMHG | TEMPERATURE: 96.3 F | BODY MASS INDEX: 28.05 KG/M2 | HEART RATE: 112 BPM | WEIGHT: 158.29 LBS | HEIGHT: 63 IN

## 2024-11-01 DIAGNOSIS — Z01.818 PREOP TESTING: ICD-10-CM

## 2024-11-01 LAB
ABO GROUP (TYPE) IN BLOOD: NORMAL
ANTIBODY SCREEN: NORMAL
ERYTHROCYTE [DISTWIDTH] IN BLOOD BY AUTOMATED COUNT: 11.6 % (ref 11.5–14.5)
HCT VFR BLD AUTO: 41.8 % (ref 36–46)
HGB BLD-MCNC: 14.6 G/DL (ref 12–16)
MCH RBC QN AUTO: 31.6 PG (ref 26–34)
MCHC RBC AUTO-ENTMCNC: 34.9 G/DL (ref 32–36)
MCV RBC AUTO: 91 FL (ref 80–100)
NRBC BLD-RTO: 0 /100 WBCS (ref 0–0)
PLATELET # BLD AUTO: 328 X10*3/UL (ref 150–450)
RBC # BLD AUTO: 4.62 X10*6/UL (ref 4–5.2)
RH FACTOR (ANTIGEN D): NORMAL
WBC # BLD AUTO: 9.6 X10*3/UL (ref 4.4–11.3)

## 2024-11-01 PROCEDURE — 85027 COMPLETE CBC AUTOMATED: CPT

## 2024-11-01 PROCEDURE — 86901 BLOOD TYPING SEROLOGIC RH(D): CPT

## 2024-11-01 PROCEDURE — 99204 OFFICE O/P NEW MOD 45 MIN: CPT | Performed by: NURSE PRACTITIONER

## 2024-11-01 PROCEDURE — 36415 COLL VENOUS BLD VENIPUNCTURE: CPT

## 2024-11-01 RX ORDER — LISDEXAMFETAMINE DIMESYLATE 20 MG/1
20 CAPSULE ORAL EVERY MORNING
COMMUNITY

## 2024-11-01 ASSESSMENT — ENCOUNTER SYMPTOMS
NUMBNESS: 1
MUSCULOSKELETAL NEGATIVE: 1
NECK NEGATIVE: 1
ENDOCRINE NEGATIVE: 1
CONSTITUTIONAL NEGATIVE: 1
CARDIOVASCULAR NEGATIVE: 1
EYES NEGATIVE: 1
RESPIRATORY NEGATIVE: 1
GASTROINTESTINAL NEGATIVE: 1

## 2024-11-01 ASSESSMENT — DUKE ACTIVITY SCORE INDEX (DASI)
CAN YOU WALK INDOORS, SUCH AS AROUND YOUR HOUSE: YES
CAN YOU DO LIGHT WORK AROUND THE HOUSE LIKE DUSTING OR WASHING DISHES: YES
CAN YOU PARTICIPATE IN MODERATE RECREATIONAL ACTIVITIES LIKE GOLF, BOWLING, DANCING, DOUBLES TENNIS OR THROWING A BASEBALL OR FOOTBALL: NO
DASI METS SCORE: 6.7
CAN YOU WALK A BLOCK OR TWO ON LEVEL GROUND: YES
CAN YOU HAVE SEXUAL RELATIONS: YES
TOTAL_SCORE: 32.2
CAN YOU DO MODERATE WORK AROUND THE HOUSE LIKE VACUUMING, SWEEPING FLOORS OR CARRYING GROCERIES: YES
CAN YOU RUN A SHORT DISTANCE: YES
CAN YOU TAKE CARE OF YOURSELF (EAT, DRESS, BATHE, OR USE TOILET): YES
CAN YOU DO YARD WORK LIKE RAKING LEAVES, WEEDING OR PUSHING A MOWER: NO
CAN YOU CLIMB A FLIGHT OF STAIRS OR WALK UP A HILL: YES
CAN YOU DO HEAVY WORK AROUND THE HOUSE LIKE SCRUBBING FLOORS OR LIFTING AND MOVING HEAVY FURNITURE: NO
CAN YOU PARTICIPATE IN STRENOUS SPORTS LIKE SWIMMING, SINGLES TENNIS, FOOTBALL, BASKETBALL, OR SKIING: NO

## 2024-11-01 ASSESSMENT — LIFESTYLE VARIABLES: SMOKING_STATUS: NONSMOKER

## 2024-11-07 DIAGNOSIS — Z01.818 PREOP EXAMINATION: ICD-10-CM

## 2024-11-13 ENCOUNTER — OFFICE VISIT (OUTPATIENT)
Dept: ORTHOPEDIC SURGERY | Facility: HOSPITAL | Age: 39
End: 2024-11-13
Payer: COMMERCIAL

## 2024-11-13 DIAGNOSIS — S43.431D SUPERIOR GLENOID LABRUM LESION OF RIGHT SHOULDER, SUBSEQUENT ENCOUNTER: Primary | ICD-10-CM

## 2024-11-13 PROCEDURE — 99212 OFFICE O/P EST SF 10 MIN: CPT | Performed by: ORTHOPAEDIC SURGERY

## 2024-11-13 NOTE — PROGRESS NOTES
Patient is here for reevaluation of her right shoulder she is doing very well she is not symptomatic.  She feels little bit of tightness in certain positions.    On exam today she can elevate 270 degrees on both sides motor power abduction and external rotation is 5/5 bilaterally.  Internal rotation radiology team 10 external rotation 60/65.    Right shoulder superior labrum tear status post labral repair and biceps tenodesis    Patient is doing very well.  She may pursue activities as tolerated with no specific restrictions.  Follow-up will be as needed.    This was dictated using voice recognition software and not corrected for grammatical or spelling errors.

## 2024-11-15 NOTE — HOSPITAL COURSE
[ ] consents signed    Gynecologic Surgery History and Physical    Subjective   Jen Franks is a 39 y.o. with abnormal uterine bleeding presenting for hysteroscopy with endometrial ablation and all indicated procedures.    PMH: anxiety, bipolar disorder, ADHD, TMJ dysfunction, sleep apnea, thyroid nodule  PSH: R shoulder surgery, bilateral knee surgery  Imaging: [id number] 23  UTERUS:  The uterus measures 7.2 x 3.0 x 4.1 cm. The uterine myometrium appears normal.   - Normal ovaries    Lab Results   Component Value Date    WBC 9.6 2024    HGB 14.6 2024    HCT 41.8 2024    MCV 91 2024     2024       Lab Results   Component Value Date    GLUCOSE 94 2024    CALCIUM 9.2 2024     2024    K 4.1 2024    CO2 26 2024     2024    BUN 12 2024    CREATININE 0.83 2024       Obstetrical History   OB History   No obstetric history on file.       Past Medical History  Past Medical History:   Diagnosis Date    Abnormal uterine bleeding (AUB)     ADHD (attention deficit hyperactivity disorder)     Adverse effect of anesthesia     Postop agitation    Anxiety     Bipolar affect, depressed (Multi)     GERD (gastroesophageal reflux disease)     Other conditions influencing health status 2017    History of frequent headaches    Other specified disorders of temporomandibular joint 2017    Temporomandibular jaw dysfunction    PONV (postoperative nausea and vomiting)     Sleep apnea     Thyroid nodule         Past Surgical History   Past Surgical History:   Procedure Laterality Date    INTRAUTERINE DEVICE INSERTION      removal    KNEE Bilateral     Arthroscopy    KNEE SURGERY      SHOULDER ARTHROSCOPY Right 2024       Social History  Social History     Tobacco Use    Smoking status: Former     Current packs/day: 0.00     Types: Cigarettes     Quit date: 2017     Years since quittin.8     Passive exposure: Never    Smokeless  tobacco: Never   Substance Use Topics    Alcohol use: Not Currently     Substance and Sexual Activity   Drug Use Never       Allergies  Egg and Meperidine     Medications  No medications prior to admission.       ROS: negative except per HPI    Objective    Last Vitals  There were no vitals taken for this visit.    Physical Examination  General: no acute distress  HEENT: normocephalic, atraumatic  Heart: warm and well perfused  Lungs: breathing comfortably on room air  Abdomen: nondistended  Extremities: moving all extremities  Neuro: awake and conversant  Psych: appropriate mood and affect    Lab Review          Lab Results   Component Value Date    WBC 9.6 11/01/2024    HGB 14.6 11/01/2024    HCT 41.8 11/01/2024    MCV 91 11/01/2024     11/01/2024       Lab Results   Component Value Date    GLUCOSE 94 07/24/2024    CALCIUM 9.2 07/24/2024     07/24/2024    K 4.1 07/24/2024    CO2 26 07/24/2024     07/24/2024    BUN 12 07/24/2024    CREATININE 0.83 07/24/2024       Assessment/Plan     Jen Franks is a 39 y.o. with abnormal uterine bleeding presenting for scheduled surgery.    Plan to proceed with hysteroscopy with endometrial ablation and all indicated procedures  Surgical consent was reviewed. The risks of surgery were discussed including: bleeding (including need for blood transfusion in life-threatening situations; risks of transfusion), infection, damage to surrounding organs. The patient had the opportunity to answer questions and desired to proceed with surgery following our discussion. Both verbal and written consents were obtained.

## 2024-11-16 ENCOUNTER — LAB (OUTPATIENT)
Dept: LAB | Facility: LAB | Age: 39
End: 2024-11-16
Payer: COMMERCIAL

## 2024-11-16 ENCOUNTER — LAB REQUISITION (OUTPATIENT)
Dept: LAB | Facility: HOSPITAL | Age: 39
End: 2024-11-16
Payer: COMMERCIAL

## 2024-11-16 DIAGNOSIS — Z01.818 PREOP EXAMINATION: ICD-10-CM

## 2024-11-16 LAB
ABO GROUP (TYPE) IN BLOOD: NORMAL
ANTIBODY SCREEN: NORMAL
RH FACTOR (ANTIGEN D): NORMAL

## 2024-11-16 PROCEDURE — 86901 BLOOD TYPING SEROLOGIC RH(D): CPT

## 2024-11-16 PROCEDURE — 86900 BLOOD TYPING SEROLOGIC ABO: CPT

## 2024-11-16 PROCEDURE — 36415 COLL VENOUS BLD VENIPUNCTURE: CPT

## 2024-11-16 PROCEDURE — 86850 RBC ANTIBODY SCREEN: CPT

## 2024-11-18 ENCOUNTER — ANESTHESIA EVENT (OUTPATIENT)
Dept: OPERATING ROOM | Facility: HOSPITAL | Age: 39
End: 2024-11-18
Payer: COMMERCIAL

## 2024-11-19 ENCOUNTER — HOSPITAL ENCOUNTER (OUTPATIENT)
Facility: HOSPITAL | Age: 39
Setting detail: OUTPATIENT SURGERY
Discharge: HOME | End: 2024-11-19
Attending: OBSTETRICS & GYNECOLOGY | Admitting: OBSTETRICS & GYNECOLOGY
Payer: COMMERCIAL

## 2024-11-19 ENCOUNTER — ANESTHESIA (OUTPATIENT)
Dept: OPERATING ROOM | Facility: HOSPITAL | Age: 39
End: 2024-11-19
Payer: COMMERCIAL

## 2024-11-19 VITALS
TEMPERATURE: 97.3 F | DIASTOLIC BLOOD PRESSURE: 68 MMHG | RESPIRATION RATE: 14 BRPM | SYSTOLIC BLOOD PRESSURE: 92 MMHG | HEART RATE: 70 BPM | OXYGEN SATURATION: 97 %

## 2024-11-19 DIAGNOSIS — N93.9 ABNORMAL UTERINE BLEEDING (AUB): ICD-10-CM

## 2024-11-19 LAB — PREGNANCY TEST URINE, POC: NEGATIVE

## 2024-11-19 PROCEDURE — 7100000001 HC RECOVERY ROOM TIME - INITIAL BASE CHARGE: Performed by: OBSTETRICS & GYNECOLOGY

## 2024-11-19 PROCEDURE — A58563 PR HYSTEROSCOPY,W/ENDOMETRIAL ABLATION

## 2024-11-19 PROCEDURE — 3600000008 HC OR TIME - EACH INCREMENTAL 1 MINUTE - PROCEDURE LEVEL THREE: Performed by: OBSTETRICS & GYNECOLOGY

## 2024-11-19 PROCEDURE — A58563 PR HYSTEROSCOPY,W/ENDOMETRIAL ABLATION: Performed by: ANESTHESIOLOGY

## 2024-11-19 PROCEDURE — 2720000007 HC OR 272 NO HCPCS: Performed by: OBSTETRICS & GYNECOLOGY

## 2024-11-19 PROCEDURE — 3600000003 HC OR TIME - INITIAL BASE CHARGE - PROCEDURE LEVEL THREE: Performed by: OBSTETRICS & GYNECOLOGY

## 2024-11-19 PROCEDURE — 58563 HYSTEROSCOPY ABLATION: CPT | Performed by: OBSTETRICS & GYNECOLOGY

## 2024-11-19 PROCEDURE — 2500000002 HC RX 250 W HCPCS SELF ADMINISTERED DRUGS (ALT 637 FOR MEDICARE OP, ALT 636 FOR OP/ED)

## 2024-11-19 PROCEDURE — 81025 URINE PREGNANCY TEST: CPT | Performed by: ANESTHESIOLOGY

## 2024-11-19 PROCEDURE — 7100000002 HC RECOVERY ROOM TIME - EACH INCREMENTAL 1 MINUTE: Performed by: OBSTETRICS & GYNECOLOGY

## 2024-11-19 PROCEDURE — 7100000010 HC PHASE TWO TIME - EACH INCREMENTAL 1 MINUTE: Performed by: OBSTETRICS & GYNECOLOGY

## 2024-11-19 PROCEDURE — 3700000002 HC GENERAL ANESTHESIA TIME - EACH INCREMENTAL 1 MINUTE: Performed by: OBSTETRICS & GYNECOLOGY

## 2024-11-19 PROCEDURE — 7100000009 HC PHASE TWO TIME - INITIAL BASE CHARGE: Performed by: OBSTETRICS & GYNECOLOGY

## 2024-11-19 PROCEDURE — 2500000004 HC RX 250 GENERAL PHARMACY W/ HCPCS (ALT 636 FOR OP/ED)

## 2024-11-19 PROCEDURE — 88305 TISSUE EXAM BY PATHOLOGIST: CPT | Mod: TC,SUR | Performed by: OBSTETRICS & GYNECOLOGY

## 2024-11-19 PROCEDURE — 3700000001 HC GENERAL ANESTHESIA TIME - INITIAL BASE CHARGE: Performed by: OBSTETRICS & GYNECOLOGY

## 2024-11-19 RX ORDER — ONDANSETRON HYDROCHLORIDE 2 MG/ML
4 INJECTION, SOLUTION INTRAVENOUS ONCE AS NEEDED
Status: DISCONTINUED | OUTPATIENT
Start: 2024-11-19 | End: 2024-11-19 | Stop reason: HOSPADM

## 2024-11-19 RX ORDER — APREPITANT 40 MG/1
CAPSULE ORAL AS NEEDED
Status: DISCONTINUED | OUTPATIENT
Start: 2024-11-19 | End: 2024-11-19

## 2024-11-19 RX ORDER — LIDOCAINE HYDROCHLORIDE 10 MG/ML
0.1 INJECTION, SOLUTION EPIDURAL; INFILTRATION; INTRACAUDAL; PERINEURAL ONCE
Status: DISCONTINUED | OUTPATIENT
Start: 2024-11-19 | End: 2024-11-19 | Stop reason: HOSPADM

## 2024-11-19 RX ORDER — MIDAZOLAM HYDROCHLORIDE 1 MG/ML
INJECTION INTRAMUSCULAR; INTRAVENOUS AS NEEDED
Status: DISCONTINUED | OUTPATIENT
Start: 2024-11-19 | End: 2024-11-19

## 2024-11-19 RX ORDER — DROPERIDOL 2.5 MG/ML
0.62 INJECTION, SOLUTION INTRAMUSCULAR; INTRAVENOUS ONCE AS NEEDED
Status: DISCONTINUED | OUTPATIENT
Start: 2024-11-19 | End: 2024-11-19 | Stop reason: HOSPADM

## 2024-11-19 RX ORDER — SODIUM CHLORIDE, SODIUM LACTATE, POTASSIUM CHLORIDE, CALCIUM CHLORIDE 600; 310; 30; 20 MG/100ML; MG/100ML; MG/100ML; MG/100ML
100 INJECTION, SOLUTION INTRAVENOUS CONTINUOUS
Status: DISCONTINUED | OUTPATIENT
Start: 2024-11-19 | End: 2024-11-19 | Stop reason: HOSPADM

## 2024-11-19 RX ORDER — FENTANYL CITRATE 50 UG/ML
INJECTION, SOLUTION INTRAMUSCULAR; INTRAVENOUS AS NEEDED
Status: DISCONTINUED | OUTPATIENT
Start: 2024-11-19 | End: 2024-11-19

## 2024-11-19 RX ORDER — PROPOFOL 10 MG/ML
INJECTION, EMULSION INTRAVENOUS AS NEEDED
Status: DISCONTINUED | OUTPATIENT
Start: 2024-11-19 | End: 2024-11-19

## 2024-11-19 RX ORDER — METOPROLOL TARTRATE 1 MG/ML
INJECTION, SOLUTION INTRAVENOUS AS NEEDED
Status: DISCONTINUED | OUTPATIENT
Start: 2024-11-19 | End: 2024-11-19

## 2024-11-19 RX ORDER — LIDOCAINE HCL/PF 100 MG/5ML
SYRINGE (ML) INTRAVENOUS AS NEEDED
Status: DISCONTINUED | OUTPATIENT
Start: 2024-11-19 | End: 2024-11-19

## 2024-11-19 RX ORDER — ONDANSETRON HYDROCHLORIDE 2 MG/ML
INJECTION, SOLUTION INTRAVENOUS AS NEEDED
Status: DISCONTINUED | OUTPATIENT
Start: 2024-11-19 | End: 2024-11-19

## 2024-11-19 RX ORDER — HYDROMORPHONE HYDROCHLORIDE 0.2 MG/ML
0.2 INJECTION INTRAMUSCULAR; INTRAVENOUS; SUBCUTANEOUS EVERY 5 MIN PRN
Status: DISCONTINUED | OUTPATIENT
Start: 2024-11-19 | End: 2024-11-19 | Stop reason: HOSPADM

## 2024-11-19 RX ORDER — OXYCODONE HYDROCHLORIDE 5 MG/1
5 TABLET ORAL EVERY 4 HOURS PRN
Status: DISCONTINUED | OUTPATIENT
Start: 2024-11-19 | End: 2024-11-19 | Stop reason: HOSPADM

## 2024-11-19 RX ORDER — SCOLOPAMINE TRANSDERMAL SYSTEM 1 MG/1
PATCH, EXTENDED RELEASE TRANSDERMAL AS NEEDED
Status: DISCONTINUED | OUTPATIENT
Start: 2024-11-19 | End: 2024-11-19

## 2024-11-19 ASSESSMENT — PAIN SCALES - GENERAL
PAINLEVEL_OUTOF10: 0 - NO PAIN

## 2024-11-19 ASSESSMENT — COLUMBIA-SUICIDE SEVERITY RATING SCALE - C-SSRS
1. IN THE PAST MONTH, HAVE YOU WISHED YOU WERE DEAD OR WISHED YOU COULD GO TO SLEEP AND NOT WAKE UP?: NO
6. HAVE YOU EVER DONE ANYTHING, STARTED TO DO ANYTHING, OR PREPARED TO DO ANYTHING TO END YOUR LIFE?: NO
2. HAVE YOU ACTUALLY HAD ANY THOUGHTS OF KILLING YOURSELF?: NO

## 2024-11-19 ASSESSMENT — PAIN - FUNCTIONAL ASSESSMENT
PAIN_FUNCTIONAL_ASSESSMENT: 0-10

## 2024-11-19 NOTE — ANESTHESIA POSTPROCEDURE EVALUATION
Patient: Jen Franks    Procedure Summary       Date: 11/19/24 Room / Location: [facility] OR 06 / Virtual [facility] OR    Anesthesia Start: 1044 Anesthesia Stop: 1127    Procedure: Hysteroscopy with Endometrial Curettage Diagnosis:       Abnormal uterine bleeding (AUB)      (Abnormal uterine bleeding (AUB) [N93.9])    Surgeons: Bridgette Cesar MD Responsible Provider: Luis Kinney MD    Anesthesia Type: general ASA Status: 2            Anesthesia Type: general    Vitals Value Taken Time   BP 97/67 11/19/24 1200   Temp 36.1 °C (97 °F) 11/19/24 1120   Pulse 75 11/19/24 1200   Resp 15 11/19/24 1200   SpO2 96 % 11/19/24 1200       Anesthesia Post Evaluation    Patient location during evaluation: PACU  Patient participation: complete - patient participated  Level of consciousness: awake  Pain management: adequate  Airway patency: patent  Cardiovascular status: acceptable  Respiratory status: acceptable  Hydration status: acceptable  Postoperative Nausea and Vomiting: none        There were no known notable events for this encounter.

## 2024-11-19 NOTE — ANESTHESIA PREPROCEDURE EVALUATION
Patient: Jen Franks    Procedure Information       Date/Time: 11/19/24 1040    Procedure: Hysteroscopy with Ablation Tissue    Location: [facility] OR 06 / Virtual [facility] OR    Surgeons: Bridgette Cesar MD            Relevant Problems   Anesthesia  Post anesthesia agitation      Neuro   (+) Cervical radiculopathy      Endocrine   (+) Multiple thyroid nodules      GYN   (+) Abnormal uterine bleeding (AUB)       Clinical information reviewed:                   NPO Detail:  No data recorded     PHYSICAL EXAM    Anesthesia Plan    History of general anesthesia?: yes  History of complications of general anesthesia?: no    ASA 2     general     intravenous induction   Anesthetic plan and risks discussed with patient.

## 2024-11-19 NOTE — OP NOTE
Date: 2024  OR Location: [facility] OR    Name: Jen Franks, : 1985, Age: 39 y.o., MRN: [id number], Sex: female    Diagnosis  Pre-op Diagnosis      * Abnormal uterine bleeding (AUB) [N93.9] Post-op Diagnosis     * Abnormal uterine bleeding (AUB) [N93.9]     Procedures  Hysteroscopy with Endometrial Curettage  99330 - MT HYSTEROSCOPY ENDOMETRIAL ABLATION      Surgeons      * Bridgette Cesar - Primary    Resident/Fellow/Other Assistant:  Surgeons and Role:     * Jaspreet Murray MD - Resident - Assisting    Staff:   Circulator: [place]  Scrub Person: Naila  Relief Circulator: Magalis    Anesthesia Staff: Anesthesiologist: Luis Kinney MD  CRNA: YU mAin-CRNA, MISTY  SRNA: Tamanna Gann    Procedure Summary  Anesthesia: General  ASA: II  Estimated Blood Loss: 2mL  Intra-op Medications: * Intraprocedure medication information is unavailable because the case start and end events have not been set *           Anesthesia Record               Intraprocedure I/O Totals          Intake    NaCl 0.9 % bolus 200.00 mL    Total Intake 200 mL          Specimen:   ID Type Source Tests Collected by Time   1 : [facility] Tissue ENDOMETRIUM CURETTINGS SURGICAL PATHOLOGY EXAM Bridgette Cesar MD 2024 1123                 Procedure Details:  The patient was seen in the preoperative area. The site of surgery was properly noted/marked if necessary per policy. The patient has been actively warmed in preoperative area. Preoperative antibiotics are not indicated. Venous thrombosis prophylaxis are not indicated.    Findings: Normal appearing external genitalia, normal vagina and cervix. Uterine cavity with proliferative endometrium diffusely throughout without obvious polyps or intracavitary fibroids. 1mm defect at the uterine fundus c/f small uterine peforation.    The patient was taken to the OR.  A time out was performed.  Anesthesia was then induced without difficulty.  The patient was then repositioned in dorsal  lithotomy using the Bob stirrups.  She was then prepped and draped in the usual sterile fashion.  A bivalve speculum was placed in the vagina.  A tenaculum was placed on the anterior lip of the cervix. The cervix was then gently serially dilated to 15 Maltese. Aveta hysteroscope was gently inserted and normal saline infusion was started. Bilateral cornu appeared normal, but a small 1mm defect was appreciated at the uterine fundus without bleeding but concerning for possible small uterine perforation. No intrauterine fibroid or polyp was appreciated, but there was diffuse proliferative endometrium present. Given the above findings the decision was made to not proceed with the ablation at the time of the procedure. Sharp curettage was then used to collect endometrial tissue sampling. Tissue was sent to pathology  Bleeding was minimal.  Tenaculum was removed and all instruments were removed from the vagina.  The tenaculum site was hemostatic. Fluid deficit was 750 ml.  The patient tolerated the procedure well.  All counts were correct.  The patient was awoken and taken to the PACU.     Complications:  None; patient tolerated the procedure well.     Disposition: PACU - hemodynamically stable.  Condition: stable

## 2024-11-19 NOTE — H&P
Gynecologic Surgery History and Physical    Subjective   Jen Franks is a 39 y.o. with abnormal uterine bleeding presenting for hysteroscopy with endometrial ablation and all indicated procedures.    PMH: anxiety, bipolar disorder, ADHD, TMJ dysfunction, sleep apnea, thyroid nodule  PSH: R shoulder surgery, bilateral knee surgery  Imaging: TVUS 23  UTERUS:  The uterus measures 7.2 x 3.0 x 4.1 cm. The uterine myometrium appears normal.   - Normal ovaries    Lab Results   Component Value Date    WBC 9.6 2024    HGB 14.6 2024    HCT 41.8 2024    MCV 91 2024     2024       Lab Results   Component Value Date    GLUCOSE 94 2024    CALCIUM 9.2 2024     2024    K 4.1 2024    CO2 26 2024     2024    BUN 12 2024    CREATININE 0.83 2024       Obstetrical History   OB History   No obstetric history on file.       Past Medical History  Past Medical History:   Diagnosis Date    Abnormal uterine bleeding (AUB)     ADHD (attention deficit hyperactivity disorder)     Adverse effect of anesthesia     Postop agitation    Anxiety     Bipolar affect, depressed (Multi)     GERD (gastroesophageal reflux disease)     Other conditions influencing health status 2017    History of frequent headaches    Other specified disorders of temporomandibular joint 2017    Temporomandibular jaw dysfunction    PONV (postoperative nausea and vomiting)     Sleep apnea     Thyroid nodule         Past Surgical History   Past Surgical History:   Procedure Laterality Date    INTRAUTERINE DEVICE INSERTION      removal    KNEE Bilateral     Arthroscopy    KNEE SURGERY      SHOULDER ARTHROSCOPY Right 2024       Social History  Social History     Tobacco Use    Smoking status: Former     Current packs/day: 0.00     Types: Cigarettes     Quit date: 2017     Years since quittin.8     Passive exposure: Never    Smokeless tobacco: Never    Substance Use Topics    Alcohol use: Not Currently     Substance and Sexual Activity   Drug Use Never       Allergies  Egg and Meperidine     Medications  No medications prior to admission.       ROS: negative except per HPI    Objective    Last Vitals  There were no vitals taken for this visit.    Physical Examination  General: no acute distress  HEENT: normocephalic, atraumatic  Heart: warm and well perfused  Lungs: breathing comfortably on room air  Abdomen: nondistended  Extremities: moving all extremities  Neuro: awake and conversant  Psych: appropriate mood and affect    Lab Review          Lab Results   Component Value Date    WBC 9.6 11/01/2024    HGB 14.6 11/01/2024    HCT 41.8 11/01/2024    MCV 91 11/01/2024     11/01/2024       Lab Results   Component Value Date    GLUCOSE 94 07/24/2024    CALCIUM 9.2 07/24/2024     07/24/2024    K 4.1 07/24/2024    CO2 26 07/24/2024     07/24/2024    BUN 12 07/24/2024    CREATININE 0.83 07/24/2024       Assessment/Plan     Jen Franks is a 39 y.o. with abnormal uterine bleeding presenting for scheduled surgery.    Plan to proceed with hysteroscopy with endometrial ablation and all indicated procedures  Surgical consent was reviewed. The risks of surgery were discussed including: bleeding (including need for blood transfusion in life-threatening situations; risks of transfusion), infection, damage to surrounding organs. The patient had the opportunity to answer questions and desired to proceed with surgery following our discussion. Both verbal and written consents were obtained.    D/w Dr. Arsenio Murray MD PGY-3

## 2024-11-19 NOTE — ANESTHESIA PROCEDURE NOTES
Airway  Date/Time: 11/19/2024 10:50 AM  Urgency: elective    Airway not difficult    Staffing  Performed: SRNA   Authorized by: Luis Kinney MD    Performed by: YU Amin-CRNA, MISTY  Patient location during procedure: OR    Indications and Patient Condition  Indications for airway management: anesthesia and airway protection  Spontaneous Ventilation: absent  Sedation level: deep  Preoxygenated: yes  Patient position: sniffing  Mask difficulty assessment: 1 - vent by mask  Planned trial extubation    Final Airway Details  Final airway type: supraglottic airway      Successful airway: classic  Size 4     Number of attempts at approach: 1

## 2024-11-25 ENCOUNTER — TELEPHONE (OUTPATIENT)
Dept: OBSTETRICS AND GYNECOLOGY | Facility: CLINIC | Age: 39
End: 2024-11-25
Payer: COMMERCIAL

## 2024-11-25 LAB
LABORATORY COMMENT REPORT: NORMAL
PATH REPORT.FINAL DX SPEC: NORMAL
PATH REPORT.GROSS SPEC: NORMAL
PATH REPORT.RELEVANT HX SPEC: NORMAL
PATH REPORT.TOTAL CANCER: NORMAL

## 2024-11-25 NOTE — TELEPHONE ENCOUNTER
Called patient to discuss symptoms she is experiencing  Identified by name and   Nausea and dizziness, getting hot/sweaty but no fever, having some mild abdominal pain  Surgery on    Been having symptoms since yesterday  Discussed with Dr. Cesar that patient should probably go to ED for eval to make sure no infection is present  Patient verbalized understanding and states she will go, all questions/concerns addressed at this time.    CINTHYA ThomasN RN

## 2024-12-09 ENCOUNTER — APPOINTMENT (OUTPATIENT)
Dept: OBSTETRICS AND GYNECOLOGY | Facility: CLINIC | Age: 39
End: 2024-12-09
Payer: COMMERCIAL

## 2024-12-09 VITALS
SYSTOLIC BLOOD PRESSURE: 102 MMHG | WEIGHT: 165 LBS | HEIGHT: 63 IN | BODY MASS INDEX: 29.23 KG/M2 | DIASTOLIC BLOOD PRESSURE: 72 MMHG

## 2024-12-09 DIAGNOSIS — Z48.89 ENCOUNTER FOR POSTOPERATIVE CARE: Primary | ICD-10-CM

## 2024-12-09 PROCEDURE — 99024 POSTOP FOLLOW-UP VISIT: CPT | Performed by: OBSTETRICS & GYNECOLOGY

## 2024-12-09 PROCEDURE — 3008F BODY MASS INDEX DOCD: CPT | Performed by: OBSTETRICS & GYNECOLOGY

## 2024-12-09 ASSESSMENT — ENCOUNTER SYMPTOMS
MUSCULOSKELETAL NEGATIVE: 0
RESPIRATORY NEGATIVE: 0
CONSTITUTIONAL NEGATIVE: 0
GASTROINTESTINAL NEGATIVE: 0
NEUROLOGICAL NEGATIVE: 0
EYES NEGATIVE: 0
ALLERGIC/IMMUNOLOGIC NEGATIVE: 0
HEMATOLOGIC/LYMPHATIC NEGATIVE: 0
CARDIOVASCULAR NEGATIVE: 0
ENDOCRINE NEGATIVE: 0
PSYCHIATRIC NEGATIVE: 0

## 2024-12-10 PROBLEM — Z48.89 ENCOUNTER FOR POSTOPERATIVE CARE: Status: ACTIVE | Noted: 2024-12-10

## 2025-01-15 ENCOUNTER — APPOINTMENT (OUTPATIENT)
Dept: OBSTETRICS AND GYNECOLOGY | Facility: CLINIC | Age: 40
End: 2025-01-15
Payer: COMMERCIAL

## 2025-02-04 ENCOUNTER — OFFICE VISIT (OUTPATIENT)
Dept: OBSTETRICS AND GYNECOLOGY | Facility: CLINIC | Age: 40
End: 2025-02-04
Payer: COMMERCIAL

## 2025-02-04 VITALS — SYSTOLIC BLOOD PRESSURE: 104 MMHG | BODY MASS INDEX: 28.87 KG/M2 | WEIGHT: 163 LBS | DIASTOLIC BLOOD PRESSURE: 70 MMHG

## 2025-02-04 DIAGNOSIS — N93.9 ABNORMAL UTERINE BLEEDING (AUB): Primary | ICD-10-CM

## 2025-02-04 PROCEDURE — 99215 OFFICE O/P EST HI 40 MIN: CPT | Performed by: STUDENT IN AN ORGANIZED HEALTH CARE EDUCATION/TRAINING PROGRAM

## 2025-02-04 RX ORDER — PHENAZOPYRIDINE HYDROCHLORIDE 200 MG/1
200 TABLET, FILM COATED ORAL ONCE
OUTPATIENT
Start: 2025-02-04 | End: 2025-02-04

## 2025-02-04 RX ORDER — METRONIDAZOLE 500 MG/100ML
500 INJECTION, SOLUTION INTRAVENOUS ONCE
OUTPATIENT
Start: 2025-02-04 | End: 2025-02-04

## 2025-02-04 RX ORDER — GABAPENTIN 600 MG/1
600 TABLET ORAL ONCE
OUTPATIENT
Start: 2025-02-04 | End: 2025-02-04

## 2025-02-04 RX ORDER — CELECOXIB 400 MG/1
400 CAPSULE ORAL ONCE
OUTPATIENT
Start: 2025-02-04 | End: 2025-02-04

## 2025-02-04 RX ORDER — ACETAMINOPHEN 325 MG/1
975 TABLET ORAL ONCE
OUTPATIENT
Start: 2025-02-04 | End: 2025-02-04

## 2025-02-04 RX ORDER — CEFAZOLIN SODIUM 2 G/100ML
2 INJECTION, SOLUTION INTRAVENOUS ONCE
OUTPATIENT
Start: 2025-02-04 | End: 2025-02-04

## 2025-02-04 ASSESSMENT — ENCOUNTER SYMPTOMS
PSYCHIATRIC NEGATIVE: 0
CARDIOVASCULAR NEGATIVE: 0
RESPIRATORY NEGATIVE: 0
GASTROINTESTINAL NEGATIVE: 0
NEUROLOGICAL NEGATIVE: 0
ENDOCRINE NEGATIVE: 0
EYES NEGATIVE: 0
HEMATOLOGIC/LYMPHATIC NEGATIVE: 0
ALLERGIC/IMMUNOLOGIC NEGATIVE: 0
CONSTITUTIONAL NEGATIVE: 0
MUSCULOSKELETAL NEGATIVE: 0

## 2025-02-04 NOTE — PROGRESS NOTES
Division of Minimally Invasive Gynecologic Surgery  [facility]    Date: 2/4/25 - Gynecology Consult     HISTORY OF PRESENT ILLNESS:  Jen Franks 39 y.o. G0 presents to discuss AUB-A/O?    She has a long history of prolonged and irregular bleeding. Menses are sometimes manageable, but pain can be debilitating. She has canceled social events for pain in the past. Moderate bloating. Denies dyschezia, dysuria. She does have intermittent pain and post coital bleeding with intercourse (insertional and deep).     She had a prior attempted endometrial ablation, but her uterine cavity was too small. She also previously tried an IUD but found this too painful. She has also tried OCP's but these did not help with pain or heavy flow, only helped to regulate. She is considering definitive management w/ hysterectomy. She has no interest in fertility.     Imaging:   - Pelvic US 11/2024 showed uterus measuring 7cm x 3cm x 4cm. Otherwise unremarkable.   Labs:   - BMP 7/2024 WNL  - CBC 11/2024 WNL   - TSH 2023 WNL   - Glucose 7/2024 WNL   Screening:   - Last pap 2021 negative/negative, LEEP in her 20's   - EMC 11/2024 benign   PMHx: ADHD, bipolar depression, GERD, MARIELY (does not need CPAP), PONV, acne  PSHx: D+C/hysteroscopy, knee surgery, shoulder surgery x 2     PAST MEDICAL HISTORY:  Past Medical History:   Diagnosis Date    Abnormal uterine bleeding (AUB)     ADHD (attention deficit hyperactivity disorder)     Adverse effect of anesthesia     Postop agitation    Anxiety     Bipolar affect, depressed (Multi)     GERD (gastroesophageal reflux disease)     Other conditions influencing health status 01/31/2017    History of frequent headaches    Other specified disorders of temporomandibular joint 01/31/2017    Temporomandibular jaw dysfunction    PONV (postoperative nausea and vomiting)     Sleep apnea     Thyroid nodule        PAST SURGICAL HISTORY:  Past Surgical History:   Procedure Laterality  Date    INTRAUTERINE DEVICE INSERTION      removal    KNEE Bilateral     Arthroscopy    KNEE SURGERY      SHOULDER ARTHROSCOPY Right 08/08/2024       PHYSICAL EXAMINATION:  VITAL SIGNS: /70   Wt 73.9 kg (163 lb)   BMI 28.87 kg/m²      Constitutional:  No acute distress, well-nourished and well-developed  HEENT: EOM grossly intact, MMM, neck supple and with full ROM  Pulm:  Effort normal. No accessory muscle usage.  No respiratory distress.  Neurological:  She is alert and oriented to person place and time.  Skin: Warm, no pallor.  Psychiatric:  She has normal mood and affect.    ASSESSMENT:  Jen Franks 39 y.o. G0 presents to discuss AUB-A/O?  - We discussed the different etiologies of abnormal uterine bleeding and painful menses, including adenomyosis and endometriosis. I am most suspicious of adenomyosis in her case. We reviewed treatment options, including expectant management, medical options, procedural interventions, and surgical treatments. She is most interested in definitive management with hysterectomy.  - We discussed the standard procedure for laparoscopic hysterectomy, including contained morcellation. We reviewed the risks/benefits/alternatives to surgery. She is aware of the risk of bleeding, infection, and damage to nearby structures, including bladder, ureters, bowel, and vasculature. She is agreeable to blood transfusion if recommended. We discussed the risks/benefits of contained morcellation vs laparotomy, and she is comfortable with proceeding with contained morcellation. We reviewed the small risk of laparotomy and the fact that fertility following hysterectomy is not an option.     PLAN:  - Total laparoscopic hysterectomy, bilateral salpingectomy, any indicated procedure. PAT: Yes. Any location.   - Plan for excision of endo only if visible disease  - Hyst form signed today     Penelope Scherer MD  Division of Minimally Invasive Gynecologic Surgery  [facility]  [facility]

## 2025-02-07 DIAGNOSIS — Z01.818 PREOP EXAMINATION: Primary | ICD-10-CM

## 2025-04-18 ENCOUNTER — HOSPITAL ENCOUNTER (OUTPATIENT)
Dept: RADIOLOGY | Facility: CLINIC | Age: 40
Discharge: HOME | End: 2025-04-18
Payer: COMMERCIAL

## 2025-04-18 ENCOUNTER — OFFICE VISIT (OUTPATIENT)
Dept: GASTROENTEROLOGY | Facility: CLINIC | Age: 40
End: 2025-04-18
Payer: COMMERCIAL

## 2025-04-18 VITALS
TEMPERATURE: 97.2 F | WEIGHT: 155 LBS | SYSTOLIC BLOOD PRESSURE: 96 MMHG | HEIGHT: 63 IN | DIASTOLIC BLOOD PRESSURE: 66 MMHG | HEART RATE: 88 BPM | BODY MASS INDEX: 27.46 KG/M2

## 2025-04-18 DIAGNOSIS — R19.4 CHANGE IN BOWEL HABITS: ICD-10-CM

## 2025-04-18 DIAGNOSIS — R19.4 CHANGE IN BOWEL HABITS: Primary | ICD-10-CM

## 2025-04-18 PROCEDURE — 99204 OFFICE O/P NEW MOD 45 MIN: CPT | Performed by: NURSE PRACTITIONER

## 2025-04-18 PROCEDURE — 3008F BODY MASS INDEX DOCD: CPT | Performed by: NURSE PRACTITIONER

## 2025-04-18 PROCEDURE — 99214 OFFICE O/P EST MOD 30 MIN: CPT | Performed by: NURSE PRACTITIONER

## 2025-04-18 PROCEDURE — 74018 RADEX ABDOMEN 1 VIEW: CPT

## 2025-04-18 ASSESSMENT — ENCOUNTER SYMPTOMS
ENDOCRINE NEGATIVE: 1
NEUROLOGICAL NEGATIVE: 1
MUSCULOSKELETAL NEGATIVE: 1
CARDIOVASCULAR NEGATIVE: 1
ROS GI COMMENTS: MUCOUS IN STOOL
ALLERGIC/IMMUNOLOGIC NEGATIVE: 1
EYES NEGATIVE: 1
PSYCHIATRIC NEGATIVE: 1
HEMATOLOGIC/LYMPHATIC NEGATIVE: 1
RESPIRATORY NEGATIVE: 1
CONSTITUTIONAL NEGATIVE: 1

## 2025-04-18 NOTE — PATIENT INSTRUCTIONS
Mucous in stools- this is a normal finding as the GI tract is a mucous membrane. I would recommend adding a fiber supplement daily such as metamucil, citrucel, benefiber daily to help with your symptoms. I would recommend getting an abd x-ray to assess for stool burden     Screening colonoscopy at age 46    I will contact you with your results and determine follow up

## 2025-04-18 NOTE — PROGRESS NOTES
Subjective   Patient ID: Jen Franks is a 39 y.o. female who presents for New Patient Visit.  HPI  39-year-old female for new patient visit for evaluation of mucus in her bowel movements  Family history grandfather had colon cancer age 50  Labs reviewed 11/25/2024  H&H 14.5 and 41.8  Normal LFTs  1/31/2023 TSH 1.88  Usually goes to [facility] care  When she has a Bm will have mucous  No blood  BM: daily  Usually complete  Trying to loose weights  Fiber- fruits and vegetables  Water- lots  Exercise- getting back to working out  Had shoulder surgery - bicep and labral repair  Hysterectomy scheduled for next month for adenomyosis  LMP- 3/16/25  Tried to have ablation  No chance she is pregnant    Review of Systems   Constitutional: Negative.    HENT: Negative.     Eyes: Negative.    Respiratory: Negative.     Cardiovascular: Negative.    Gastrointestinal:         Mucous in stool   Endocrine: Negative.    Genitourinary: Negative.    Musculoskeletal: Negative.    Skin: Negative.    Allergic/Immunologic: Negative.    Neurological: Negative.    Hematological: Negative.    Psychiatric/Behavioral: Negative.         Objective   Physical Exam  Constitutional:       Appearance: Normal appearance.   HENT:      Head: Normocephalic.      Nose: Nose normal.      Mouth/Throat:      Mouth: Mucous membranes are moist.   Eyes:      Pupils: Pupils are equal, round, and reactive to light.   Cardiovascular:      Rate and Rhythm: Normal rate and regular rhythm.      Pulses: Normal pulses.      Heart sounds: Normal heart sounds.   Pulmonary:      Effort: Pulmonary effort is normal.      Breath sounds: Normal breath sounds.   Abdominal:      General: Bowel sounds are normal.      Palpations: Abdomen is soft.   Musculoskeletal:         General: Normal range of motion.      Cervical back: Normal range of motion and neck supple.   Skin:     General: Skin is warm and dry.   Neurological:      Mental Status: She is alert.    Psychiatric:         Mood and Affect: Mood normal.         Assessment/Plan        Mucous in stools- this is a normal finding as the GI tract is a mucous membrane. I would recommend adding a fiber supplement daily such as metamucil, citrucel, benefiber daily to help with your symptoms. I would recommend getting an abd x-ray to assess for stool burden     Screening colonoscopy at age 46    I will contact you with your results and determine follow up    MICHELLE Aguilera 04/18/25 3:21 PM    Head atraumatic, normal cephalic shape.

## 2025-04-23 ENCOUNTER — APPOINTMENT (OUTPATIENT)
Dept: PREADMISSION TESTING | Facility: HOSPITAL | Age: 40
End: 2025-04-23
Payer: COMMERCIAL

## 2025-04-23 ENCOUNTER — CLINICAL SUPPORT (OUTPATIENT)
Dept: PREADMISSION TESTING | Facility: HOSPITAL | Age: 40
End: 2025-04-23
Payer: COMMERCIAL

## 2025-04-23 DIAGNOSIS — Z01.818 PREOP EXAMINATION: ICD-10-CM

## 2025-04-23 NOTE — CPM/PAT NURSE NOTE
CPM/PAT Nurse Note      Name: Jen Franks (Jen Franks)  /Age: 1985/39 y.o.       Medical History[1]    Surgical History[2]    Patient Sexual activity questions deferred to the physician.    Family History[3]    Allergies[4]    Prior to Admission medications    Medication Sig Start Date End Date Taking? Authorizing Provider   atomoxetine (Strattera) 60 mg capsule Take 1 capsule (60 mg) by mouth once daily. 24   Historical Provider, MD   busPIRone (Buspar) 10 mg tablet Take 1 tablet (10 mg) by mouth 4 times a day.    Historical Provider, MD   calcium carbonate (CALTRATE 600 ORAL) Take by mouth once daily.    Historical Provider, MD   cariprazine (Vraylar) 3 mg capsule Take 1 capsule (3 mg) by mouth once daily.    Historical Provider, MD   cetirizine (ZyrTEC) 10 mg tablet Take 1 tablet (10 mg) by mouth once daily. MORNING    Historical Provider, MD   ELDERBERRY FRUIT ORAL Take by mouth once daily.    Historical Provider, MD   lisdexamfetamine (Vyvanse) 60 mg capsule Take 1 capsule (60 mg) by mouth once daily in the morning.    Historical Provider, MD   omeprazole (PriLOSEC) 20 mg DR capsule Take 1 capsule (20 mg) by mouth if needed. 24   Historical Provider, MD   propranolol (Inderal) 20 mg tablet Take 1 tablet (20 mg) by mouth once daily. EVERY MORNING FOR TREATMENT OF ANXIETY 21   Historical Provider, MD   psyllium (Metamucil) 3.4 gram packet Take 1 packet by mouth once daily.    Historical Provider, MD   spironolactone (Aldactone) 50 mg tablet Take 1 tablet (50 mg) by mouth 2 times a day. ACNE    Historical Provider, MD   traZODone (Desyrel) 50 mg tablet Take 3 tablets (150 mg) by mouth once daily at bedtime.    Historical Provider, MD SCOTT ROS     DASI Risk Score      Flowsheet Row Pre-Admission Testing from 2024 in [facility] Pre-Admission Testing from 2024 in [facility]   Can you take care of yourself (eat, dress, bathe, or use toilet)?   2.75 filed at 11/01/2024 1523 2.75 filed at 07/24/2024 1436   Can you walk indoors, such as around your house? 1.75 filed at 11/01/2024 1523 1.75 filed at 07/24/2024 1436   Can you walk a block or two on level ground?  2.75 filed at 11/01/2024 1523 2.75 filed at 07/24/2024 1436   Can you climb a flight of stairs or walk up a hill? 5.5 filed at 11/01/2024 1523 5.5 filed at 07/24/2024 1436   Can you run a short distance? 8 filed at 11/01/2024 1523 8 filed at 07/24/2024 1436   Can you do light work around the house like dusting or washing dishes? 2.7 filed at 11/01/2024 1523 2.7 filed at 07/24/2024 1436   Can you do moderate work around the house like vacuuming, sweeping floors or carrying groceries? 3.5 filed at 11/01/2024 1523 3.5 filed at 07/24/2024 1436   Can you do heavy work around the house like scrubbing floors or lifting and moving heavy furniture?  0 filed at 11/01/2024 1523 8 filed at 07/24/2024 1436   Can you do yard work like raking leaves, weeding or pushing a mower? 0 filed at 11/01/2024 1523 4.5 filed at 07/24/2024 1436   Can you have sexual relations? 5.25 filed at 11/01/2024 1523 5.25 filed at 07/24/2024 1436   Can you participate in moderate recreational activities like golf, bowling, dancing, doubles tennis or throwing a baseball or football? 0 filed at 11/01/2024 1523 6 filed at 07/24/2024 1436   Can you participate in strenous sports like swimming, singles tennis, football, basketball, or skiing? 0 filed at 11/01/2024 1523 7.5 filed at 07/24/2024 1436   DASI SCORE 32.2 filed at 11/01/2024 1523 58.2 filed at 07/24/2024 1436   METS Score (Will be calculated only when all the questions are answered) 6.7 filed at 11/01/2024 1523 9.9 filed at 07/24/2024 1436          Isidrai DVT Assessment      Flowsheet Row Pre-Admission Testing from 11/1/2024 in [facility] Pre-Admission Testing from 7/24/2024 in [facility]   DVT Score (IF A SCORE IS NOT CALCULATING, MUST SELECT A BMI  TO COMPLETE) 2 filed at 11/01/2024 1554 3 filed at 07/24/2024 1435   Surgical Factors Minor surgery planned filed at 11/01/2024 1554 --   BMI (BMI MUST BE CHOSEN) 30 or less filed at 11/01/2024 1554 30 or less filed at 07/24/2024 1435   RETIRED: Current Status -- Major surgery planned, including arthroscopic and laproscopic (1-2 hours) filed at 07/24/2024 1435   RETIRED: Age -- Less than 40 years filed at 07/24/2024 1435          Modified Frailty Index      Flowsheet Row Pre-Admission Testing from 11/1/2024 in [facility] Pre-Admission Testing from 7/24/2024 in [facility]   Non-independent functional status (problems with dressing, bathing, personal grooming, or cooking) 0 filed at 11/01/2024 1554 0 filed at 07/24/2024 1436   History of diabetes mellitus  0 filed at 11/01/2024 1554 0 filed at 07/24/2024 1436   History of COPD 0 filed at 11/01/2024 1554 0 filed at 07/24/2024 1436   History of CHF No filed at 11/01/2024 1554 No filed at 07/24/2024 1436   History of MI 0 filed at 11/01/2024 1554 0 filed at 07/24/2024 1436   History of Percutaneous Coronary Intervention, Cardiac Surgery, or Angina No filed at 11/01/2024 1554 No filed at 07/24/2024 1436   Hypertension requiring the use of medication  0 filed at 11/01/2024 1554 0 filed at 07/24/2024 1436   Peripheral vascular disease 0 filed at 11/01/2024 1554 0 filed at 07/24/2024 1436   Impaired sensorium (cognitive impairement or loss, clouding, or delirium) 0 filed at 11/01/2024 1554 0 filed at 07/24/2024 1436   TIA or CVA withouy residual deficit 0 filed at 11/01/2024 1554 0 filed at 07/24/2024 1436   Cerebrovascular accident with deficit 0 filed at 11/01/2024 1554 0 filed at 07/24/2024 1436   Modified Frailty Index Calculator 0 filed at 11/01/2024 1554 0 filed at 07/24/2024 1436          [id number] Stroke Risk Points  Current as of just now        N/A 0 to 9 Points:      Last Change: N/A          The [id number] risk score (Lip  BRADY, et al. 2009. © 2010 [employer]) quantifies the risk of stroke for a patient with atrial fibrillation. For patients without atrial fibrillation or under the age of 18 this score appears as N/A. Higher score values generally indicate higher risk of stroke.        This score is not applicable to this patient. Components are not calculated.          Revised Cardiac Risk Index      Flowsheet Row Pre-Admission Testing from 11/1/2024 in [facility] Pre-Admission Testing from 7/24/2024 in [facility]   High-Risk Surgery (Intraperitoneal, Intrathoracic,Suprainguinal vascular) 0 filed at 11/01/2024 1554 0 filed at 07/24/2024 1436   History of ischemic heart disease (History of MI, History of positive exercuse test, Current chest paint considered due to myocardial ischemia, Use of nitrate therapy, ECG with pathological Q Waves) 0 filed at 11/01/2024 1554 0 filed at 07/24/2024 1436   History of congestive heart failure (pulmonary edemia, bilateral rales or S3 gallop, Paroxysmal nocturnal dyspnea, CXR showing pulmonary vascular redistribution) 0 filed at 11/01/2024 1554 0 filed at 07/24/2024 1436   History of cerebrovascular disease (Prior TIA or stroke) 0 filed at 11/01/2024 1554 0 filed at 07/24/2024 1436   Pre-operative insulin treatment 0 filed at 11/01/2024 1554 0 filed at 07/24/2024 1436   Pre-operative creatinine>2 mg/dl 0 filed at 11/01/2024 1554 0 filed at 07/24/2024 1436   Revised Cardiac Risk Calculator 0 filed at 11/01/2024 1554 0 filed at 07/24/2024 1436          Apfel Simplified Score      Flowsheet Row Pre-Admission Testing from 11/1/2024 in [facility]   Smoking status 1 filed at 11/01/2024 1554   History of motion sickness or PONV  0 filed at 11/01/2024 1554   Use of postoperative opioids 1 filed at 11/01/2024 1554   Gender - Female 1=Yes filed at 11/01/2024 1554   Apfel Simplified Score Calculator 3 filed at 11/01/2024 1554           Risk Analysis Index Results This Encounter    No data found in the last 10 encounters.       Stop Bang Score      Flowsheet Row Pre-Admission Testing from 11/1/2024 in [facility] Pre-Admission Testing from 7/24/2024 in [facility]   Do you snore loudly? 1 filed at 11/01/2024 1523 0 filed at 07/24/2024 1412   Do you often feel tired or fatigued after your sleep? 1 filed at 11/01/2024 1523 1 filed at 07/24/2024 1412   Has anyone ever observed you stop breathing in your sleep? 1 filed at 11/01/2024 1523 1 filed at 07/24/2024 1412   Do you have or are you being treated for high blood pressure? 0 filed at 11/01/2024 1523 0 filed at 07/24/2024 1412   Recent BMI (Calculated) 28.4 filed at 11/01/2024 1523 29.1 filed at 07/24/2024 1412   Is BMI greater than 35 kg/m2? 0=No filed at 11/01/2024 1523 0=No filed at 07/24/2024 1412   Age older than 50 years old? 0=No filed at 11/01/2024 1523 0=No filed at 07/24/2024 1412   Is your neck circumference greater than 17 inches (Male) or 16 inches (Female)? 0 filed at 11/01/2024 1523 0 filed at 07/24/2024 1412   Gender - Male 0=No filed at 11/01/2024 1523 0=No filed at 07/24/2024 1412   STOP-BANG Total Score 3 filed at 11/01/2024 1523 2 filed at 07/24/2024 1412          Prodigy: High Risk  Total Score: 0          ARISCAT Score for Postoperative Pulmonary Complications    No data to display       Rosales Perioperative Risk for Myocardial Infarction or Cardiac Arrest (ROD)    No data to display         Nurse Plan of Action: RN screening call complete.  Reviewed allergies, medications and pharmacy, medical, surgical and social history with patient.  Chart updated.                [1]   Past Medical History:  Diagnosis Date    Abnormal uterine bleeding (AUB)     Plan: Laparoscopic Assisted Total Hysterectomy; Bilateral Salpingectomy 5/13/25    ADHD (attention deficit hyperactivity disorder)     Adverse effect of anesthesia     Postop agitation    Anxiety      Bipolar affect, depressed (Multi)     GERD (gastroesophageal reflux disease)     PONV (postoperative nausea and vomiting)     Sleep apnea     no CPAP    Thyroid nodule    [2]   Past Surgical History:  Procedure Laterality Date    CERVICAL BIOPSY  W/ LOOP ELECTRODE EXCISION      COLPOSCOPY      HYSTEROSCOPY  11/19/2024    Hysteroscopy with Endometrial Curettage    INTRAUTERINE DEVICE INSERTION      removal    KNEE Bilateral     Arthroscopy    SHOULDER ARTHROSCOPY Right 08/08/2024    Diagnostic arthroscopy RIGHT Shoulder with debridement and possible biceps tenodesis    SHOULDER SURGERY Right 01/11/2024    RIGHT SHOULDER SLAP REPAIR   [3]   Family History  Problem Relation Name Age of Onset    Hypertension Brother      Colon cancer Maternal Grandfather      Alcohol abuse Mother Talia Chavez     Arthritis Mother Talia Chavez     Depression Mother Talia Chavez     Hearing loss Mother Talia Velizgerardo     Alcohol abuse Father Tuan Myrickgerardo     Hypertension Brother Tuan Benavidesyancy    [4]   Allergies  Allergen Reactions    Egg GI Upset    Meperidine Anxiety     PANIC ATTACK

## 2025-04-29 ENCOUNTER — PRE-ADMISSION TESTING (OUTPATIENT)
Dept: PREADMISSION TESTING | Facility: HOSPITAL | Age: 40
End: 2025-04-29
Payer: COMMERCIAL

## 2025-05-02 ENCOUNTER — LAB (OUTPATIENT)
Dept: LAB | Facility: HOSPITAL | Age: 40
End: 2025-05-02
Payer: COMMERCIAL

## 2025-05-02 ENCOUNTER — PRE-ADMISSION TESTING (OUTPATIENT)
Dept: PREADMISSION TESTING | Facility: HOSPITAL | Age: 40
End: 2025-05-02
Payer: COMMERCIAL

## 2025-05-02 VITALS
WEIGHT: 152.12 LBS | BODY MASS INDEX: 26.95 KG/M2 | DIASTOLIC BLOOD PRESSURE: 84 MMHG | HEART RATE: 99 BPM | HEIGHT: 63 IN | RESPIRATION RATE: 16 BRPM | OXYGEN SATURATION: 97 % | TEMPERATURE: 97 F | SYSTOLIC BLOOD PRESSURE: 116 MMHG

## 2025-05-02 DIAGNOSIS — Z01.818 PREOP TESTING: Primary | ICD-10-CM

## 2025-05-02 DIAGNOSIS — Z01.818 ENCOUNTER FOR OTHER PREPROCEDURAL EXAMINATION: Primary | ICD-10-CM

## 2025-05-02 LAB
ANION GAP SERPL CALC-SCNC: 14 MMOL/L (ref 10–20)
BASOPHILS # BLD AUTO: 0.03 X10*3/UL (ref 0–0.1)
BASOPHILS NFR BLD AUTO: 0.4 %
BUN SERPL-MCNC: 10 MG/DL (ref 6–23)
CALCIUM SERPL-MCNC: 9.8 MG/DL (ref 8.6–10.3)
CHLORIDE SERPL-SCNC: 101 MMOL/L (ref 98–107)
CO2 SERPL-SCNC: 25 MMOL/L (ref 21–32)
CREAT SERPL-MCNC: 0.91 MG/DL (ref 0.5–1.05)
EGFRCR SERPLBLD CKD-EPI 2021: 83 ML/MIN/1.73M*2
EOSINOPHIL # BLD AUTO: 0.16 X10*3/UL (ref 0–0.7)
EOSINOPHIL NFR BLD AUTO: 1.9 %
ERYTHROCYTE [DISTWIDTH] IN BLOOD BY AUTOMATED COUNT: 11.7 % (ref 11.5–14.5)
GLUCOSE SERPL-MCNC: 80 MG/DL (ref 74–99)
HCT VFR BLD AUTO: 42.9 % (ref 36–46)
HGB BLD-MCNC: 14.4 G/DL (ref 12–16)
IMM GRANULOCYTES # BLD AUTO: 0.03 X10*3/UL (ref 0–0.7)
IMM GRANULOCYTES NFR BLD AUTO: 0.4 % (ref 0–0.9)
LYMPHOCYTES # BLD AUTO: 2.7 X10*3/UL (ref 1.2–4.8)
LYMPHOCYTES NFR BLD AUTO: 32.9 %
MCH RBC QN AUTO: 30.3 PG (ref 26–34)
MCHC RBC AUTO-ENTMCNC: 33.6 G/DL (ref 32–36)
MCV RBC AUTO: 90 FL (ref 80–100)
MONOCYTES # BLD AUTO: 0.56 X10*3/UL (ref 0.1–1)
MONOCYTES NFR BLD AUTO: 6.8 %
NEUTROPHILS # BLD AUTO: 4.73 X10*3/UL (ref 1.2–7.7)
NEUTROPHILS NFR BLD AUTO: 57.6 %
NRBC BLD-RTO: 0 /100 WBCS (ref 0–0)
PLATELET # BLD AUTO: 337 X10*3/UL (ref 150–450)
POTASSIUM SERPL-SCNC: 4.2 MMOL/L (ref 3.5–5.3)
RBC # BLD AUTO: 4.75 X10*6/UL (ref 4–5.2)
SODIUM SERPL-SCNC: 136 MMOL/L (ref 136–145)
WBC # BLD AUTO: 8.2 X10*3/UL (ref 4.4–11.3)

## 2025-05-02 PROCEDURE — 85025 COMPLETE CBC W/AUTO DIFF WBC: CPT

## 2025-05-02 PROCEDURE — 80048 BASIC METABOLIC PNL TOTAL CA: CPT

## 2025-05-02 PROCEDURE — 93005 ELECTROCARDIOGRAM TRACING: CPT | Performed by: PHYSICIAN ASSISTANT

## 2025-05-02 PROCEDURE — 93010 ELECTROCARDIOGRAM REPORT: CPT | Performed by: INTERNAL MEDICINE

## 2025-05-02 PROCEDURE — 99204 OFFICE O/P NEW MOD 45 MIN: CPT | Performed by: PHYSICIAN ASSISTANT

## 2025-05-02 ASSESSMENT — ENCOUNTER SYMPTOMS
MUSCULOSKELETAL NEGATIVE: 1
GASTROINTESTINAL NEGATIVE: 1
HEMATOLOGIC/LYMPHATIC NEGATIVE: 1
CARDIOVASCULAR NEGATIVE: 1
CONSTITUTIONAL NEGATIVE: 1
ENDOCRINE NEGATIVE: 1
PSYCHIATRIC NEGATIVE: 1
ALLERGIC/IMMUNOLOGIC NEGATIVE: 1
RESPIRATORY NEGATIVE: 1
NEUROLOGICAL NEGATIVE: 1
EYES NEGATIVE: 1

## 2025-05-02 NOTE — CPM/PAT H&P
[facility] Evaluation       Name: Jen Franks (Jen Franks)  /Age: 1985/39 y.o.     In-Person       Chief Complaint: Abnormal uterine bleeding (AUB) [N93.9]     HPI      Date of Consult: 25    Referring Provider: Dr. Scherer     Surgery, Date, and Length: Laparoscopic Assisted Total Hysterectomy; Bilateral Salpingectomy - Bilateral; 25; 125 minutes     Jen Franks  is a 39 year-old female  who presents to the [facility] for perioperative risk assessment prior to surgery. She has a long history of prolonged and irregular bleeding. Menses are sometimes manageable, but pain can be debilitating. She has canceled social events for pain in the past. Moderate bloating. Denies dyschezia, dysuria. She does have intermittent pain and post coital bleeding with intercourse (insertional and deep).   She had a prior attempted endometrial ablation, but her uterine cavity was too small. She also previously tried an IUD but found this too painful. She has also tried OCP's but these did not help with pain or heavy flow, only helped to regulate. She has no interest in fertility.          This note was created in part upon personal review of patient's medical records.      Patient is scheduled to have Laparoscopic Assisted Total Hysterectomy; Bilateral Salpingectomy - Bilateral     Medical History  Past Medical History:   Diagnosis Date    Abnormal uterine bleeding (AUB)     Plan: Laparoscopic Assisted Total Hysterectomy; Bilateral Salpingectomy 25    Acne     on spironolactone    ADHD (attention deficit hyperactivity disorder)     Adverse effect of anesthesia     Postop agitation    Anxiety     on propranolol    Bipolar affect, depressed (Multi)     GERD (gastroesophageal reflux disease)     under control    PONV (postoperative nausea and vomiting)     Sleep apnea     no CPAP    Thyroid nodule     under observation            Surgical History  Past Surgical History:   Procedure Laterality Date    CERVICAL BIOPSY   W/ LOOP ELECTRODE EXCISION      COLPOSCOPY      HYSTEROSCOPY  2024    Hysteroscopy with Endometrial Curettage    INTRAUTERINE DEVICE INSERTION      removal    KNEE Bilateral     Arthroscopy    SHOULDER ARTHROSCOPY Right 2024    Diagnostic arthroscopy RIGHT Shoulder with debridement and possible biceps tenodesis    SHOULDER SURGERY Right 2024    RIGHT SHOULDER SLAP REPAIR    WISDOM TOOTH EXTRACTION               Family history:  Family History   Problem Relation Name Age of Onset    Hypertension Brother      Colon cancer Maternal Grandfather      Alcohol abuse Mother Talia Chavez     Arthritis Mother Talia Chavez     Depression Mother Talia Chavez     Hearing loss Mother Talia Chavez     Alcohol abuse Father Tuan Franks     Hypertension Brother Tuan Franks         Social history:  Social History     Socioeconomic History    Marital status:      Spouse name: Not on file    Number of children: Not on file    Years of education: Not on file    Highest education level: Not on file   Occupational History    Not on file   Tobacco Use    Smoking status: Former     Current packs/day: 0.00     Average packs/day: 1 pack/day for 14.0 years (14.0 ttl pk-yrs)     Types: Cigarettes     Start date:      Quit date: 2017     Years since quittin.3     Passive exposure: Never    Smokeless tobacco: Never   Vaping Use    Vaping status: Never Used   Substance and Sexual Activity    Alcohol use: Not Currently    Drug use: Never    Sexual activity: Defer   Other Topics Concern    Not on file   Social History Narrative    Not on file     Social Drivers of Health     Financial Resource Strain: Not on file   Food Insecurity: Not on file   Transportation Needs: Not on file   Physical Activity: Not on file   Stress: Not on file   Social Connections: Not on file   Intimate Partner Violence: Not on file   Housing Stability: Not on file        Current Outpatient Medications   Medication Instructions     "atomoxetine (Strattera) 60 mg capsule Take 1 capsule (60 mg) by mouth once daily.    busPIRone (BUSPAR) 10 mg, 4 times daily    calcium carbonate (CALTRATE 600 ORAL) Daily    cariprazine (VRAYLAR) 3 mg, Daily    cetirizine (ZYRTEC) 10 mg, Daily    ELDERBERRY FRUIT ORAL Daily    lisdexamfetamine (VYVANSE) 60 mg, Every morning    omeprazole (PRILOSEC) 20 mg, As needed    propranolol (Inderal) 20 mg tablet Take 1 tablet (20 mg) by mouth once daily. EVERY MORNING FOR TREATMENT OF ANXIETY    psyllium (Metamucil) 3.4 gram packet 1 packet, Daily    spironolactone (ALDACTONE) 50 mg, 2 times daily    traZODone (DESYREL) 150 mg, Nightly            Visit Vitals  /84   Pulse 99   Temp 36.1 °C (97 °F)   Resp 16   Ht 1.6 m (5' 2.99\")   Wt 69 kg (152 lb 1.9 oz)   SpO2 97%   BMI 26.95 kg/m²   OB Status Ablation   Smoking Status Former   BSA 1.75 m²         Review of Systems   Constitutional: Negative.    HENT: Negative.     Eyes: Negative.         Glasses   Respiratory: Negative.     Cardiovascular: Negative.         METS 4    regular exercise Without chest pain / SOB    Gastrointestinal: Negative.    Endocrine: Negative.    Genitourinary:  Positive for menstrual problem.   Musculoskeletal: Negative.    Skin: Negative.    Allergic/Immunologic: Negative.    Neurological: Negative.    Hematological: Negative.    Psychiatric/Behavioral: Negative.          Physical Exam  Vitals reviewed.   Constitutional:       Appearance: Normal appearance.   HENT:      Head: Normocephalic and atraumatic.      Right Ear: External ear normal.      Left Ear: External ear normal.      Nose: Nose normal.      Mouth/Throat:      Pharynx: Oropharynx is clear.   Eyes:      Extraocular Movements: Extraocular movements intact.      Conjunctiva/sclera: Conjunctivae normal.      Pupils: Pupils are equal, round, and reactive to light.   Cardiovascular:      Rate and Rhythm: Normal rate and regular rhythm.      Heart sounds: Normal heart sounds.   Pulmonary: "      Effort: Pulmonary effort is normal.      Breath sounds: Normal breath sounds.   Abdominal:      Palpations: Abdomen is soft.   Musculoskeletal:         General: Normal range of motion.      Cervical back: Normal range of motion and neck supple.   Skin:     General: Skin is warm and dry.   Neurological:      General: No focal deficit present.      Mental Status: She is alert and oriented to person, place, and time.   Psychiatric:         Mood and Affect: Mood normal.         Behavior: Behavior normal.          PAT AIRWAY:   Airway:     Mallampati::  II    Neck ROM::  Full    Lab Results   Component Value Date    WBC 8.2 05/02/2025    HGB 14.4 05/02/2025    HCT 42.9 05/02/2025    MCV 90 05/02/2025     05/02/2025   ;  Lab Results   Component Value Date    GLUCOSE 80 05/02/2025    CALCIUM 9.8 05/02/2025     05/02/2025    K 4.2 05/02/2025    CO2 25 05/02/2025     05/02/2025    BUN 10 05/02/2025    CREATININE 0.91 05/02/2025      EKG 5/2/25  Sinus tachycardia 104 BPM  Possible left atrial enlargement    Patient is scheduled to have Laparoscopic Assisted Total Hysterectomy; Bilateral Salpingectomy - Bilateral     Cardiovascular  METS: 4   RCRI: 0  , 3.9 % Risk of MACE  Caprini: 3    Pulmonary:  Stop Bang +MARIELY   no CPAP  ARISCAT: 26-44 points, 13.3% risk of in-hospital postoperative pulmonary complication  PRODIGY: Moderate risk for opioid induced respiratory depression     Psych  Anxiety - propanolol Continue DOS   Bipolar - continue meds     Hematology       Patient instructed to ambulate as soon as possible postoperatively to decrease thromboembolic risk.      Initiate mechanical DVT prophylaxis as soon as possible and initiate chemical prophylaxis when deemed safe from a bleeding standpoint post surgery.       VTE prophylaxis per surgical team     GI  GERD- omeprazole Continue DOS       Tests ordered in PAT: cbc, bmp, EKG   LABS REVIEWED: unremarkable     Risk assessment complete.  Patient is  scheduled for a low/intermediate surgical risk procedure.        Preoperative medication instructions were provided and reviewed with the patient.  Any additional testing or evaluation was explained to the patient.  Nothing by mouth instructions were discussed and patient's questions were answered prior to conclusion to this encounter.  Patient verbalized understanding of preoperative instructions given in preadmission testing; discharge instructions available in EMR.

## 2025-05-02 NOTE — PREPROCEDURE INSTRUCTIONS
Medication List            Accurate as of May 2, 2025  2:39 PM. Always use your most recent med list.                atomoxetine 60 mg capsule  Commonly known as: Strattera  Medication Adjustments for Surgery: Take/Use as prescribed     busPIRone 10 mg tablet  Commonly known as: Buspar  Medication Adjustments for Surgery: Take/Use as prescribed     CALTRATE 600 ORAL  Medication Adjustments for Surgery: Do Not take on the morning of surgery     cariprazine 3 mg capsule  Commonly known as: Vraylar  Medication Adjustments for Surgery: Take/Use as prescribed     cetirizine 10 mg tablet  Commonly known as: ZyrTEC  Medication Adjustments for Surgery: Take last dose 1 day (24 hours) before surgery     ELDERBERRY FRUIT ORAL  Notes to patient: HOLD 7 Days prior to surgery - Last dose 5/5     lisdexamfetamine 60 mg capsule  Commonly known as: Vyvanse  Medication Adjustments for Surgery: Do Not take on the morning of surgery     omeprazole 20 mg DR capsule  Commonly known as: PriLOSEC  Medication Adjustments for Surgery: Take on the morning of surgery     propranolol 20 mg tablet  Commonly known as: Inderal  Medication Adjustments for Surgery: Take on the morning of surgery     psyllium 3.4 gram packet  Commonly known as: Metamucil  Medication Adjustments for Surgery: Do Not take on the morning of surgery     spironolactone 50 mg tablet  Commonly known as: Aldactone  Medication Adjustments for Surgery: Take on the morning of surgery     traZODone 50 mg tablet  Commonly known as: Desyrel  Medication Adjustments for Surgery: Take/Use as prescribed                 Concerning above medication instructions - If medication is normally taken at night continue normal schedule - do not take night prior and morning of surgery.       Preoperative Deep Breathing Exercises  Why it is important to do deep breathing exercises before my surgery?  Deep breathing exercises strengthen your breathing muscles.  This helps you to recover after  your surgery and decreases the chance of breathing complications.  How are the deep breathing exercises done?  Sit straight with your back supported.  Breathe in deeply and slowly through your nose. Your lower rib cage should expand and your abdomen may move forward.  Hold that breath for 3 to 5 seconds.  Breathe out through pursed lips, slowly and completely.  Rest and repeat 10 times every hour while awake.  Rest longer if you become dizzy or lightheaded.      CONTACT SURGEON'S OFFICE IF YOU DEVELOP:  * Fever = 100.4 F   * New respiratory symptoms (e.g. cough, shortness of breath, respiratory distress, sore throat)  * Recent loss of taste or smell  *Flu like symptoms such as headache, fatigue or gastrointestinal symptoms  * You develop any open sores, shingles, burning or painful urination   AND/OR:  * You no longer wish to have the surgery.  * Any other personal circumstances change that may lead to the need to cancel or defer this surgery.  *You were admitted to any hospital within one week of your planned procedure.    SMOKING:  *Quitting smoking can make a huge difference to your health and recovery from surgery.    *If you need help with quitting, call 5-228-QUIT-NOW.    THE DAY OF SURGERY:  *Do not eat any food after midnight the night before surgery/procedure.   *YOU MUST DRINK 14 oz drink clear liquids (i.e. water, black coffee/tea, (no milk or cream) apple juice, and electrolyte drinks (Gatorade)  2 hours before your instructed arrival time to the hospital.  *You may chew gum until  2 hours before your surgery/procedure.    SURGICAL TIME  *You will be contacted between 2 p.m. and 6 p.m. the business day before your surgery with your arrival time.  *If you haven't received a call by 6pm, call [phone].  *Scheduled surgery times may change and you will be notified if this occurs-check your personal voicemail for any updates.    ON THE MORNING OF SURGERY:  *Wear comfortable, loose fitting clothing.   *Do  not use moisturizers, creams, lotions or perfume.  *All jewelry and valuables should be left at home.  *Prosthetic devices such as contact lenses, hearing aids, dentures, eyelash extensions, hairpins and body piercing must be removed before surgery.    BRING WITH YOU:  *Photo ID and insurance card  *Current list of medicines and allergies  *Pacemaker/Defibrillator/Heart stent cards  *CPAP machine and mask  *Slings/splints/crutches  *Copy of your complete Advanced Directive/DHPOA-if applicable  *Neurostimulator implant remote    PARKING AND ARRIVAL:  *Check in at the Main Entrance desk and let them know you are here for surgery.  *You will be directed to the 2nd floor surgical waiting area.    AFTER OUTPATIENT SURGERY:  *A responsible adult MUST accompany you at the time of discharge and stay with you for 24 hours after your surgery.  *You may NOT drive yourself home after surgery.  *You may use a taxi or ride sharing service ([employer], Uber) to return home ONLY if you are accompanied by a friend or family member.  *Instructions for resuming your medications will be provided by your surgeon.

## 2025-05-02 NOTE — CPM/PAT NURSE NOTE
CPM/PAT Nurse Note      Name: Jne Franks (Jen Franks)  /Age: 1985/39 y.o.       Medical History[1]    Surgical History[2]    Patient Sexual activity questions deferred to the physician.    Family History[3]    Allergies[4]    Prior to Admission medications    Medication Sig Start Date End Date Taking? Authorizing Provider   atomoxetine (Strattera) 60 mg capsule Take 1 capsule (60 mg) by mouth once daily. 24  Yes Historical Provider, MD   busPIRone (Buspar) 10 mg tablet Take 1 tablet (10 mg) by mouth 4 times a day.   Yes Historical Provider, MD   calcium carbonate (CALTRATE 600 ORAL) Take by mouth once daily.   Yes Historical Provider, MD   cariprazine (Vraylar) 3 mg capsule Take 1 capsule (3 mg) by mouth once daily.   Yes Historical Provider, MD   cetirizine (ZyrTEC) 10 mg tablet Take 1 tablet (10 mg) by mouth once daily. MORNING   Yes Historical Provider, MD   ELDERBERRY FRUIT ORAL Take by mouth once daily.   Yes Historical Provider, MD   lisdexamfetamine (Vyvanse) 60 mg capsule Take 1 capsule (60 mg) by mouth once daily in the morning.   Yes Historical Provider, MD   omeprazole (PriLOSEC) 20 mg DR capsule Take 1 capsule (20 mg) by mouth if needed. 24  Yes Historical Provider, MD   propranolol (Inderal) 20 mg tablet Take 1 tablet (20 mg) by mouth once daily. EVERY MORNING FOR TREATMENT OF ANXIETY 21  Yes Historical Provider, MD   psyllium (Metamucil) 3.4 gram packet Take 1 packet by mouth once daily.   Yes Historical Provider, MD   spironolactone (Aldactone) 50 mg tablet Take 1 tablet (50 mg) by mouth 2 times a day. ACNE   Yes Historical Provider, MD   traZODone (Desyrel) 50 mg tablet Take 3 tablets (150 mg) by mouth once daily at bedtime.   Yes Historical Provider, MD TYLER CASTRO     DASI Risk Score      Flowsheet Row Pre-Admission Testing from 2024 in [facility] Pre-Admission Testing from 2024 in [place]   Can you take care of yourself  (eat, dress, bathe, or use toilet)?  2.75 filed at 11/01/2024 1523 2.75 filed at 07/24/2024 1436   Can you walk indoors, such as around your house? 1.75 filed at 11/01/2024 1523 1.75 filed at 07/24/2024 1436   Can you walk a block or two on level ground?  2.75 filed at 11/01/2024 1523 2.75 filed at 07/24/2024 1436   Can you climb a flight of stairs or walk up a hill? 5.5 filed at 11/01/2024 1523 5.5 filed at 07/24/2024 1436   Can you run a short distance? 8 filed at 11/01/2024 1523 8 filed at 07/24/2024 1436   Can you do light work around the house like dusting or washing dishes? 2.7 filed at 11/01/2024 1523 2.7 filed at 07/24/2024 1436   Can you do moderate work around the house like vacuuming, sweeping floors or carrying groceries? 3.5 filed at 11/01/2024 1523 3.5 filed at 07/24/2024 1436   Can you do heavy work around the house like scrubbing floors or lifting and moving heavy furniture?  0 filed at 11/01/2024 1523 8 filed at 07/24/2024 1436   Can you do yard work like raking leaves, weeding or pushing a mower? 0 filed at 11/01/2024 1523 4.5 filed at 07/24/2024 1436   Can you have sexual relations? 5.25 filed at 11/01/2024 1523 5.25 filed at 07/24/2024 1436   Can you participate in moderate recreational activities like golf, bowling, dancing, doubles tennis or throwing a baseball or football? 0 filed at 11/01/2024 1523 6 filed at 07/24/2024 1436   Can you participate in strenous sports like swimming, singles tennis, football, basketball, or skiing? 0 filed at 11/01/2024 1523 7.5 filed at 07/24/2024 1436   DASI SCORE 32.2 filed at 11/01/2024 1523 58.2 filed at 07/24/2024 1436   METS Score (Will be calculated only when all the questions are answered) 6.7 filed at 11/01/2024 1523 9.9 filed at 07/24/2024 1436          Caprini DVT Assessment      Flowsheet Row Pre-Admission Testing from 11/1/2024 in [facility] Pre-Admission Testing from 7/24/2024 in [facility]   DVT Score (IF A SCORE  IS NOT CALCULATING, MUST SELECT A BMI TO COMPLETE) 2 filed at 11/01/2024 1554 3 filed at 07/24/2024 1435   Surgical Factors Minor surgery planned filed at 11/01/2024 1554 --   BMI (BMI MUST BE CHOSEN) 30 or less filed at 11/01/2024 1554 30 or less filed at 07/24/2024 1435   RETIRED: Current Status -- Major surgery planned, including arthroscopic and laproscopic (1-2 hours) filed at 07/24/2024 1435   RETIRED: Age -- Less than 40 years filed at 07/24/2024 1435          Modified Frailty Index      Flowsheet Row Pre-Admission Testing from 11/1/2024 in [facility] Pre-Admission Testing from 7/24/2024 in [facility]   Non-independent functional status (problems with dressing, bathing, personal grooming, or cooking) 0 filed at 11/01/2024 1554 0 filed at 07/24/2024 1436   History of diabetes mellitus  0 filed at 11/01/2024 1554 0 filed at 07/24/2024 1436   History of COPD 0 filed at 11/01/2024 1554 0 filed at 07/24/2024 1436   History of CHF No filed at 11/01/2024 1554 No filed at 07/24/2024 1436   History of MI 0 filed at 11/01/2024 1554 0 filed at 07/24/2024 1436   History of Percutaneous Coronary Intervention, Cardiac Surgery, or Angina No filed at 11/01/2024 1554 No filed at 07/24/2024 1436   Hypertension requiring the use of medication  0 filed at 11/01/2024 1554 0 filed at 07/24/2024 1436   Peripheral vascular disease 0 filed at 11/01/2024 1554 0 filed at 07/24/2024 1436   Impaired sensorium (cognitive impairement or loss, clouding, or delirium) 0 filed at 11/01/2024 1554 0 filed at 07/24/2024 1436   TIA or CVA withouy residual deficit 0 filed at 11/01/2024 1554 0 filed at 07/24/2024 1436   Cerebrovascular accident with deficit 0 filed at 11/01/2024 1554 0 filed at 07/24/2024 1436   Modified Frailty Index Calculator 0 filed at 11/01/2024 1554 0 filed at 07/24/2024 1436          [id number] Stroke Risk Points  Current as of just now        N/A 0 to 9 Points:      Last Change: N/A           The [id number] risk score (Lip GH, et al. 2009. © 2010 [employer]) quantifies the risk of stroke for a patient with atrial fibrillation. For patients without atrial fibrillation or under the age of 18 this score appears as N/A. Higher score values generally indicate higher risk of stroke.        This score is not applicable to this patient. Components are not calculated.          Revised Cardiac Risk Index      Flowsheet Row Pre-Admission Testing from 11/1/2024 in [facility] Pre-Admission Testing from 7/24/2024 in [facility]   High-Risk Surgery (Intraperitoneal, Intrathoracic,Suprainguinal vascular) 0 filed at 11/01/2024 1554 0 filed at 07/24/2024 1436   History of ischemic heart disease (History of MI, History of positive exercuse test, Current chest paint considered due to myocardial ischemia, Use of nitrate therapy, ECG with pathological Q Waves) 0 filed at 11/01/2024 1554 0 filed at 07/24/2024 1436   History of congestive heart failure (pulmonary edemia, bilateral rales or S3 gallop, Paroxysmal nocturnal dyspnea, CXR showing pulmonary vascular redistribution) 0 filed at 11/01/2024 1554 0 filed at 07/24/2024 1436   History of cerebrovascular disease (Prior TIA or stroke) 0 filed at 11/01/2024 1554 0 filed at 07/24/2024 1436   Pre-operative insulin treatment 0 filed at 11/01/2024 1554 0 filed at 07/24/2024 1436   Pre-operative creatinine>2 mg/dl 0 filed at 11/01/2024 1554 0 filed at 07/24/2024 1436   Revised Cardiac Risk Calculator 0 filed at 11/01/2024 1554 0 filed at 07/24/2024 1436          Apfel Simplified Score      Flowsheet Row Pre-Admission Testing from 11/1/2024 in [facility]   Smoking status 1 filed at 11/01/2024 1554   History of motion sickness or PONV  0 filed at 11/01/2024 1554   Use of postoperative opioids 1 filed at 11/01/2024 1554   Gender - Female 1=Yes filed at 11/01/2024 1554   Apfel Simplified Score Calculator  3 filed at 11/01/2024 1554          Risk Analysis Index Results This Encounter    No data found in the last 10 encounters.       Stop Bang Score      Flowsheet Row Pre-Admission Testing from 11/1/2024 in [facility] Pre-Admission Testing from 7/24/2024 in [facility]   Do you snore loudly? 1 filed at 11/01/2024 1523 0 filed at 07/24/2024 1412   Do you often feel tired or fatigued after your sleep? 1 filed at 11/01/2024 1523 1 filed at 07/24/2024 1412   Has anyone ever observed you stop breathing in your sleep? 1 filed at 11/01/2024 1523 1 filed at 07/24/2024 1412   Do you have or are you being treated for high blood pressure? 0 filed at 11/01/2024 1523 0 filed at 07/24/2024 1412   Recent BMI (Calculated) 28.4 filed at 11/01/2024 1523 29.1 filed at 07/24/2024 1412   Is BMI greater than 35 kg/m2? 0=No filed at 11/01/2024 1523 0=No filed at 07/24/2024 1412   Age older than 50 years old? 0=No filed at 11/01/2024 1523 0=No filed at 07/24/2024 1412   Is your neck circumference greater than 17 inches (Male) or 16 inches (Female)? 0 filed at 11/01/2024 1523 0 filed at 07/24/2024 1412   Gender - Male 0=No filed at 11/01/2024 1523 0=No filed at 07/24/2024 1412   STOP-BANG Total Score 3 filed at 11/01/2024 1523 2 filed at 07/24/2024 1412          Prodigy: High Risk  Total Score: 0          ARISCAT Score for Postoperative Pulmonary Complications    No data to display       Rosales Perioperative Risk for Myocardial Infarction or Cardiac Arrest (ROD)    No data to display         Nurse Plan of Action:       After Visit Summary (AVS) reviewed and patient verbalized good understanding of medications and NPO instructions.            [1]   Past Medical History:  Diagnosis Date    Abnormal uterine bleeding (AUB)     Plan: Laparoscopic Assisted Total Hysterectomy; Bilateral Salpingectomy 5/13/25    ADHD (attention deficit hyperactivity disorder)     Adverse effect of anesthesia     Postop agitation     Anxiety     Bipolar affect, depressed (Multi)     GERD (gastroesophageal reflux disease)     PONV (postoperative nausea and vomiting)     Sleep apnea     no CPAP    Thyroid nodule    [2]   Past Surgical History:  Procedure Laterality Date    CERVICAL BIOPSY  W/ LOOP ELECTRODE EXCISION      COLPOSCOPY      HYSTEROSCOPY  11/19/2024    Hysteroscopy with Endometrial Curettage    INTRAUTERINE DEVICE INSERTION      removal    KNEE Bilateral     Arthroscopy    SHOULDER ARTHROSCOPY Right 08/08/2024    Diagnostic arthroscopy RIGHT Shoulder with debridement and possible biceps tenodesis    SHOULDER SURGERY Right 01/11/2024    RIGHT SHOULDER SLAP REPAIR   [3]   Family History  Problem Relation Name Age of Onset    Hypertension Brother      Colon cancer Maternal Grandfather      Alcohol abuse Mother Talia Chavez     Arthritis Mother Talia Chavez     Depression Mother Talia Chavez     Hearing loss Mother Talia Velizgerardo     Alcohol abuse Father Tuan Myrickgerardo     Hypertension Brother Tuan Benavidesyancy    [4]   Allergies  Allergen Reactions    Egg GI Upset    Meperidine Anxiety     PANIC ATTACK

## 2025-05-05 LAB
ATRIAL RATE: 104 BPM
P AXIS: 51 DEGREES
P OFFSET: 208 MS
P ONSET: 165 MS
PR INTERVAL: 118 MS
Q ONSET: 224 MS
QRS COUNT: 17 BEATS
QRS DURATION: 72 MS
QT INTERVAL: 328 MS
QTC CALCULATION(BAZETT): 431 MS
QTC FREDERICIA: 394 MS
R AXIS: 56 DEGREES
T AXIS: 56 DEGREES
T OFFSET: 388 MS
VENTRICULAR RATE: 104 BPM

## 2025-05-12 ENCOUNTER — ANESTHESIA EVENT (OUTPATIENT)
Dept: OPERATING ROOM | Facility: HOSPITAL | Age: 40
End: 2025-05-12
Payer: COMMERCIAL

## 2025-05-12 NOTE — ANESTHESIA PREPROCEDURE EVALUATION
Patient: Jen Franks    Procedure Information       Date/Time: 05/13/25 1045    Procedure: Laparoscopic Assisted Total Hysterectomy; Bilateral Salpingectomy (Bilateral: Abdomen)    Location: [facility] OR 06 / [facility]    Surgeons: Penelope Scherer MD            Relevant Problems   Neuro   (+) Cervical radiculopathy      Endocrine   (+) Multiple thyroid nodules      GYN   (+) Abnormal uterine bleeding (AUB)       Clinical information reviewed:                   NPO Detail:  No data recorded     Physical Exam    Airway  Mallampati: II     Cardiovascular   Rhythm: regular  Rate: normal     Dental    Pulmonary    Abdominal            Anesthesia Plan    History of general anesthesia?: yes  History of complications of general anesthesia?: no    ASA 2     general     intravenous induction   Anesthetic plan and risks discussed with patient.    Plan discussed with CRNA and CAA.                                                          Pre-Anesthesia Evaluation      Jen Franks is a 39 y.o. female who presents for the above mentioned procedure due to Abnormal uterine bleeding (AUB) [N93.9]       Medical History[1]  Surgical History[2]  Social History[3]  RX Allergies[4]  Current Medications[5]  Prior to Admission medications    Medication Sig Start Date End Date Taking? Authorizing Provider   atomoxetine (Strattera) 60 mg capsule Take 1 capsule (60 mg) by mouth once daily. 5/30/24   Historical Provider, MD   busPIRone (Buspar) 10 mg tablet Take 1 tablet (10 mg) by mouth 4 times a day.    Historical Provider, MD   calcium carbonate (CALTRATE 600 ORAL) Take by mouth once daily.    Historical Provider, MD   cariprazine (Vraylar) 3 mg capsule Take 1 capsule (3 mg) by mouth once daily.    Historical Provider, MD   cetirizine (ZyrTEC) 10 mg tablet Take 1 tablet (10 mg) by mouth once daily. MORNING    Historical Provider, MD   ELDERBERRY FRUIT ORAL Take by mouth once daily.    Historical Provider, MD   lisdexamfetamine  "(Vyvanse) 60 mg capsule Take 1 capsule (60 mg) by mouth once daily in the morning.    Historical Provider, MD   omeprazole (PriLOSEC) 20 mg DR capsule Take 1 capsule (20 mg) by mouth if needed. 4/12/24   Historical Provider, MD   propranolol (Inderal) 20 mg tablet Take 1 tablet (20 mg) by mouth once daily. EVERY MORNING FOR TREATMENT OF ANXIETY 6/28/21   Historical Provider, MD   psyllium (Metamucil) 3.4 gram packet Take 1 packet by mouth once daily.    Historical Provider, MD   spironolactone (Aldactone) 50 mg tablet Take 1 tablet (50 mg) by mouth 2 times a day. ACNE    Historical Provider, MD   traZODone (Desyrel) 50 mg tablet Take 3 tablets (150 mg) by mouth once daily at bedtime.    Historical Provider, MD     No medication comments found.   Visit Vitals  OB Status Ablation   Smoking Status Former     Lab Results   Component Value Date    WBC 8.2 05/02/2025    HGB 14.4 05/02/2025    HCT 42.9 05/02/2025     05/02/2025    ABO B 11/16/2024     Lab Results   Component Value Date    TSH 1.88 01/31/2023    GLUCOSE 80 05/02/2025     05/02/2025    K 4.2 05/02/2025     05/02/2025    CREATININE 0.91 05/02/2025    BUN 10 05/02/2025    EGFR 83 05/02/2025    CO2 25 05/02/2025     No results found for: \"STAPHMRSASCR\"  Encounter Date: 05/02/25   ECG 12 lead (Clinic Performed)   Result Value    Ventricular Rate 104    Atrial Rate 104    GA Interval 118    QRS Duration 72    QT Interval 328    QTC Calculation(Bazett) 431    P Axis 51    R Axis 56    T Axis 56    QRS Count 17    Q Onset 224    P Onset 165    P Offset 208    T Offset 388    QTC Fredericia 394    Narrative    Sinus tachycardia  Possible Left atrial enlargement  Borderline ECG  No previous ECGs available  Confirmed by Cruz Ybarra ([id number]) on 5/5/2025 11:03:42 AM     No results found for this or any previous visit from the past 30260 days.    No results found for this or any previous visit from the past 1095 days.     No results found for: \"EF\"  Lab " Results   Component Value Date    PREGTESTUR Negative 2024                                   [1]   Past Medical History:  Diagnosis Date    Abnormal uterine bleeding (AUB)     Plan: Laparoscopic Assisted Total Hysterectomy; Bilateral Salpingectomy 25    Acne     on spironolactone    ADHD (attention deficit hyperactivity disorder)     Adverse effect of anesthesia     Postop agitation    Anxiety     on propranolol    Bipolar affect, depressed (Multi)     GERD (gastroesophageal reflux disease)     under control    PONV (postoperative nausea and vomiting)     Sleep apnea     no CPAP    Thyroid nodule     under observation   [2]   Past Surgical History:  Procedure Laterality Date    CERVICAL BIOPSY  W/ LOOP ELECTRODE EXCISION      COLPOSCOPY      HYSTEROSCOPY  2024    Hysteroscopy with Endometrial Curettage    INTRAUTERINE DEVICE INSERTION      removal    KNEE Bilateral     Arthroscopy    SHOULDER ARTHROSCOPY Right 2024    Diagnostic arthroscopy RIGHT Shoulder with debridement and possible biceps tenodesis    SHOULDER SURGERY Right 2024    RIGHT SHOULDER SLAP REPAIR    WISDOM TOOTH EXTRACTION     [3]   Social History  Tobacco Use    Smoking status: Former     Current packs/day: 0.00     Average packs/day: 1 pack/day for 14.0 years (14.0 ttl pk-yrs)     Types: Cigarettes     Start date:      Quit date: 2017     Years since quittin.3     Passive exposure: Never    Smokeless tobacco: Never   Vaping Use    Vaping status: Never Used   Substance Use Topics    Alcohol use: Not Currently    Drug use: Never   [4]   Allergies  Allergen Reactions    Egg GI Upset    Meperidine Anxiety     PANIC ATTACK   [5] No current facility-administered medications for this encounter.    Current Outpatient Medications:     atomoxetine (Strattera) 60 mg capsule, Take 1 capsule (60 mg) by mouth once daily., Disp: , Rfl:     busPIRone (Buspar) 10 mg tablet, Take 1 tablet (10 mg) by mouth 4 times a day., Disp: ,  Rfl:     calcium carbonate (CALTRATE 600 ORAL), Take by mouth once daily., Disp: , Rfl:     cariprazine (Vraylar) 3 mg capsule, Take 1 capsule (3 mg) by mouth once daily., Disp: , Rfl:     cetirizine (ZyrTEC) 10 mg tablet, Take 1 tablet (10 mg) by mouth once daily. MORNING, Disp: , Rfl:     ELDERBERRY FRUIT ORAL, Take by mouth once daily., Disp: , Rfl:     lisdexamfetamine (Vyvanse) 60 mg capsule, Take 1 capsule (60 mg) by mouth once daily in the morning., Disp: , Rfl:     omeprazole (PriLOSEC) 20 mg DR capsule, Take 1 capsule (20 mg) by mouth if needed., Disp: , Rfl:     propranolol (Inderal) 20 mg tablet, Take 1 tablet (20 mg) by mouth once daily. EVERY MORNING FOR TREATMENT OF ANXIETY, Disp: , Rfl:     psyllium (Metamucil) 3.4 gram packet, Take 1 packet by mouth once daily., Disp: , Rfl:     spironolactone (Aldactone) 50 mg tablet, Take 1 tablet (50 mg) by mouth 2 times a day. ACNE, Disp: , Rfl:     traZODone (Desyrel) 50 mg tablet, Take 3 tablets (150 mg) by mouth once daily at bedtime., Disp: , Rfl:

## 2025-05-12 NOTE — HOSPITAL COURSE
38 yo G0 presenting for Total laparoscopic hysterectomy, bilateral salpingectomy, any indicated procedure for AUB likely A vs O and dysmenorrhea      Imaging:   - Pelvic US 11/2024 showed uterus measuring 7cm x 3cm x 4cm. Otherwise unremarkable.   Labs:   - BMP 7/2024 WNL  - CBC 11/2024 WNL   - TSH 2023 WNL   - Glucose 7/2024 WNL   Screening:   - Last pap 2021 negative/negative, LEEP in her 20's   - EMC 11/2024 benign   PMHx: ADHD, bipolar depression, GERD, MARIELY (does not need CPAP), PONV, acne  PSHx: D+C/hysteroscopy, knee surgery, shoulder surgery x 2

## 2025-05-13 ENCOUNTER — ANESTHESIA (OUTPATIENT)
Dept: OPERATING ROOM | Facility: HOSPITAL | Age: 40
End: 2025-05-13
Payer: COMMERCIAL

## 2025-05-13 ENCOUNTER — HOSPITAL ENCOUNTER (OUTPATIENT)
Facility: HOSPITAL | Age: 40
Setting detail: OUTPATIENT SURGERY
Discharge: HOME | End: 2025-05-13
Attending: STUDENT IN AN ORGANIZED HEALTH CARE EDUCATION/TRAINING PROGRAM | Admitting: STUDENT IN AN ORGANIZED HEALTH CARE EDUCATION/TRAINING PROGRAM
Payer: COMMERCIAL

## 2025-05-13 VITALS
WEIGHT: 156.97 LBS | HEART RATE: 58 BPM | TEMPERATURE: 97.2 F | SYSTOLIC BLOOD PRESSURE: 106 MMHG | OXYGEN SATURATION: 99 % | DIASTOLIC BLOOD PRESSURE: 68 MMHG | BODY MASS INDEX: 27.81 KG/M2 | HEIGHT: 63 IN | RESPIRATION RATE: 18 BRPM

## 2025-05-13 DIAGNOSIS — Z98.890 POSTOPERATIVE STATE: ICD-10-CM

## 2025-05-13 DIAGNOSIS — G89.18 POSTOPERATIVE PAIN: ICD-10-CM

## 2025-05-13 DIAGNOSIS — N93.9 ABNORMAL UTERINE BLEEDING (AUB): Primary | ICD-10-CM

## 2025-05-13 LAB — PREGNANCY TEST URINE, POC: NEGATIVE

## 2025-05-13 PROCEDURE — 3600000004 HC OR TIME - INITIAL BASE CHARGE - PROCEDURE LEVEL FOUR: Performed by: STUDENT IN AN ORGANIZED HEALTH CARE EDUCATION/TRAINING PROGRAM

## 2025-05-13 PROCEDURE — 2500000004 HC RX 250 GENERAL PHARMACY W/ HCPCS (ALT 636 FOR OP/ED): Mod: JZ | Performed by: ANESTHESIOLOGIST ASSISTANT

## 2025-05-13 PROCEDURE — 7100000001 HC RECOVERY ROOM TIME - INITIAL BASE CHARGE: Performed by: STUDENT IN AN ORGANIZED HEALTH CARE EDUCATION/TRAINING PROGRAM

## 2025-05-13 PROCEDURE — 88307 TISSUE EXAM BY PATHOLOGIST: CPT | Performed by: STUDENT IN AN ORGANIZED HEALTH CARE EDUCATION/TRAINING PROGRAM

## 2025-05-13 PROCEDURE — 2720000007 HC OR 272 NO HCPCS: Performed by: STUDENT IN AN ORGANIZED HEALTH CARE EDUCATION/TRAINING PROGRAM

## 2025-05-13 PROCEDURE — 2500000004 HC RX 250 GENERAL PHARMACY W/ HCPCS (ALT 636 FOR OP/ED): Performed by: STUDENT IN AN ORGANIZED HEALTH CARE EDUCATION/TRAINING PROGRAM

## 2025-05-13 PROCEDURE — 2500000005 HC RX 250 GENERAL PHARMACY W/O HCPCS

## 2025-05-13 PROCEDURE — 81025 URINE PREGNANCY TEST: CPT | Performed by: STUDENT IN AN ORGANIZED HEALTH CARE EDUCATION/TRAINING PROGRAM

## 2025-05-13 PROCEDURE — 2500000005 HC RX 250 GENERAL PHARMACY W/O HCPCS: Mod: JZ | Performed by: STUDENT IN AN ORGANIZED HEALTH CARE EDUCATION/TRAINING PROGRAM

## 2025-05-13 PROCEDURE — 2500000002 HC RX 250 W HCPCS SELF ADMINISTERED DRUGS (ALT 637 FOR MEDICARE OP, ALT 636 FOR OP/ED)

## 2025-05-13 PROCEDURE — 3700000001 HC GENERAL ANESTHESIA TIME - INITIAL BASE CHARGE: Performed by: STUDENT IN AN ORGANIZED HEALTH CARE EDUCATION/TRAINING PROGRAM

## 2025-05-13 PROCEDURE — 7100000010 HC PHASE TWO TIME - EACH INCREMENTAL 1 MINUTE: Performed by: STUDENT IN AN ORGANIZED HEALTH CARE EDUCATION/TRAINING PROGRAM

## 2025-05-13 PROCEDURE — 2500000001 HC RX 250 WO HCPCS SELF ADMINISTERED DRUGS (ALT 637 FOR MEDICARE OP): Performed by: STUDENT IN AN ORGANIZED HEALTH CARE EDUCATION/TRAINING PROGRAM

## 2025-05-13 PROCEDURE — 7100000002 HC RECOVERY ROOM TIME - EACH INCREMENTAL 1 MINUTE: Performed by: STUDENT IN AN ORGANIZED HEALTH CARE EDUCATION/TRAINING PROGRAM

## 2025-05-13 PROCEDURE — 3600000009 HC OR TIME - EACH INCREMENTAL 1 MINUTE - PROCEDURE LEVEL FOUR: Performed by: STUDENT IN AN ORGANIZED HEALTH CARE EDUCATION/TRAINING PROGRAM

## 2025-05-13 PROCEDURE — A58571 PR LAPAROSCOPY W TOT HYSTERECTUTERUS <=250 GRAM  W TUBE/OVARY: Performed by: ANESTHESIOLOGY

## 2025-05-13 PROCEDURE — 7100000009 HC PHASE TWO TIME - INITIAL BASE CHARGE: Performed by: STUDENT IN AN ORGANIZED HEALTH CARE EDUCATION/TRAINING PROGRAM

## 2025-05-13 PROCEDURE — 3700000002 HC GENERAL ANESTHESIA TIME - EACH INCREMENTAL 1 MINUTE: Performed by: STUDENT IN AN ORGANIZED HEALTH CARE EDUCATION/TRAINING PROGRAM

## 2025-05-13 PROCEDURE — 2500000004 HC RX 250 GENERAL PHARMACY W/ HCPCS (ALT 636 FOR OP/ED): Mod: JZ

## 2025-05-13 PROCEDURE — A58571 PR LAPAROSCOPY W TOT HYSTERECTUTERUS <=250 GRAM  W TUBE/OVARY

## 2025-05-13 PROCEDURE — 58571 TLH W/T/O 250 G OR LESS: CPT | Performed by: STUDENT IN AN ORGANIZED HEALTH CARE EDUCATION/TRAINING PROGRAM

## 2025-05-13 PROCEDURE — 88307 TISSUE EXAM BY PATHOLOGIST: CPT | Mod: TC,AHULAB | Performed by: STUDENT IN AN ORGANIZED HEALTH CARE EDUCATION/TRAINING PROGRAM

## 2025-05-13 RX ORDER — OXYCODONE HYDROCHLORIDE 5 MG/1
5 TABLET ORAL EVERY 4 HOURS PRN
Status: DISCONTINUED | OUTPATIENT
Start: 2025-05-13 | End: 2025-05-13 | Stop reason: HOSPADM

## 2025-05-13 RX ORDER — ONDANSETRON 4 MG/1
4 TABLET, FILM COATED ORAL EVERY 6 HOURS PRN
Qty: 10 TABLET | Refills: 0 | Status: SHIPPED | OUTPATIENT
Start: 2025-05-13 | End: 2025-05-27 | Stop reason: WASHOUT

## 2025-05-13 RX ORDER — MIDAZOLAM HYDROCHLORIDE 1 MG/ML
INJECTION INTRAMUSCULAR; INTRAVENOUS CONTINUOUS PRN
Status: DISCONTINUED | OUTPATIENT
Start: 2025-05-13 | End: 2025-05-13

## 2025-05-13 RX ORDER — ROCURONIUM BROMIDE 10 MG/ML
INJECTION, SOLUTION INTRAVENOUS AS NEEDED
Status: DISCONTINUED | OUTPATIENT
Start: 2025-05-13 | End: 2025-05-13

## 2025-05-13 RX ORDER — IBUPROFEN 600 MG/1
600 TABLET, FILM COATED ORAL EVERY 6 HOURS PRN
Qty: 60 TABLET | Refills: 3 | Status: SHIPPED | OUTPATIENT
Start: 2025-05-13

## 2025-05-13 RX ORDER — ONDANSETRON HYDROCHLORIDE 2 MG/ML
4 INJECTION, SOLUTION INTRAVENOUS ONCE AS NEEDED
Status: DISCONTINUED | OUTPATIENT
Start: 2025-05-13 | End: 2025-05-13 | Stop reason: HOSPADM

## 2025-05-13 RX ORDER — ONDANSETRON HYDROCHLORIDE 2 MG/ML
INJECTION, SOLUTION INTRAVENOUS AS NEEDED
Status: DISCONTINUED | OUTPATIENT
Start: 2025-05-13 | End: 2025-05-13

## 2025-05-13 RX ORDER — ACETAMINOPHEN 325 MG/1
650 TABLET ORAL EVERY 4 HOURS PRN
Status: DISCONTINUED | OUTPATIENT
Start: 2025-05-13 | End: 2025-05-13 | Stop reason: HOSPADM

## 2025-05-13 RX ORDER — METRONIDAZOLE 500 MG/100ML
500 INJECTION, SOLUTION INTRAVENOUS ONCE
Status: COMPLETED | OUTPATIENT
Start: 2025-05-13 | End: 2025-05-13

## 2025-05-13 RX ORDER — FENTANYL CITRATE 50 UG/ML
INJECTION, SOLUTION INTRAMUSCULAR; INTRAVENOUS CONTINUOUS PRN
Status: DISCONTINUED | OUTPATIENT
Start: 2025-05-13 | End: 2025-05-13

## 2025-05-13 RX ORDER — SODIUM CHLORIDE, SODIUM LACTATE, POTASSIUM CHLORIDE, CALCIUM CHLORIDE 600; 310; 30; 20 MG/100ML; MG/100ML; MG/100ML; MG/100ML
100 INJECTION, SOLUTION INTRAVENOUS CONTINUOUS
Status: DISCONTINUED | OUTPATIENT
Start: 2025-05-13 | End: 2025-05-13 | Stop reason: HOSPADM

## 2025-05-13 RX ORDER — PROPOFOL 10 MG/ML
INJECTION, EMULSION INTRAVENOUS CONTINUOUS PRN
Status: DISCONTINUED | OUTPATIENT
Start: 2025-05-13 | End: 2025-05-13

## 2025-05-13 RX ORDER — POLYETHYLENE GLYCOL 3350 17 G/17G
17 POWDER, FOR SOLUTION ORAL DAILY PRN
Qty: 30 PACKET | Refills: 11 | Status: SHIPPED | OUTPATIENT
Start: 2025-05-13 | End: 2025-05-27 | Stop reason: WASHOUT

## 2025-05-13 RX ORDER — CELECOXIB 200 MG/1
400 CAPSULE ORAL ONCE
Status: COMPLETED | OUTPATIENT
Start: 2025-05-13 | End: 2025-05-13

## 2025-05-13 RX ORDER — GABAPENTIN 300 MG/1
600 CAPSULE ORAL ONCE
Status: COMPLETED | OUTPATIENT
Start: 2025-05-13 | End: 2025-05-13

## 2025-05-13 RX ORDER — PHENAZOPYRIDINE HYDROCHLORIDE 100 MG/1
200 TABLET, FILM COATED ORAL ONCE
Status: COMPLETED | OUTPATIENT
Start: 2025-05-13 | End: 2025-05-13

## 2025-05-13 RX ORDER — ALBUTEROL SULFATE 0.83 MG/ML
2.5 SOLUTION RESPIRATORY (INHALATION) ONCE AS NEEDED
Status: DISCONTINUED | OUTPATIENT
Start: 2025-05-13 | End: 2025-05-13 | Stop reason: HOSPADM

## 2025-05-13 RX ORDER — OXYCODONE HYDROCHLORIDE 5 MG/1
5 TABLET ORAL EVERY 6 HOURS PRN
Qty: 12 TABLET | Refills: 0 | Status: SHIPPED | OUTPATIENT
Start: 2025-05-13 | End: 2025-05-27 | Stop reason: WASHOUT

## 2025-05-13 RX ORDER — PHENYLEPHRINE HCL IN 0.9% NACL 1 MG/10 ML
SYRINGE (ML) INTRAVENOUS AS NEEDED
Status: DISCONTINUED | OUTPATIENT
Start: 2025-05-13 | End: 2025-05-13

## 2025-05-13 RX ORDER — IPRATROPIUM BROMIDE 0.5 MG/2.5ML
500 SOLUTION RESPIRATORY (INHALATION) ONCE
Status: DISCONTINUED | OUTPATIENT
Start: 2025-05-13 | End: 2025-05-13 | Stop reason: HOSPADM

## 2025-05-13 RX ORDER — LIDOCAINE HYDROCHLORIDE 20 MG/ML
INJECTION, SOLUTION EPIDURAL; INFILTRATION; INTRACAUDAL; PERINEURAL AS NEEDED
Status: DISCONTINUED | OUTPATIENT
Start: 2025-05-13 | End: 2025-05-13

## 2025-05-13 RX ORDER — SCOPOLAMINE 1 MG/3D
PATCH, EXTENDED RELEASE TRANSDERMAL AS NEEDED
Status: DISCONTINUED | OUTPATIENT
Start: 2025-05-13 | End: 2025-05-13

## 2025-05-13 RX ORDER — BUPIVACAINE HYDROCHLORIDE 5 MG/ML
INJECTION, SOLUTION PERINEURAL AS NEEDED
Status: DISCONTINUED | OUTPATIENT
Start: 2025-05-13 | End: 2025-05-13 | Stop reason: HOSPADM

## 2025-05-13 RX ORDER — LIDOCAINE HYDROCHLORIDE 10 MG/ML
0.1 INJECTION, SOLUTION EPIDURAL; INFILTRATION; INTRACAUDAL; PERINEURAL ONCE
Status: DISCONTINUED | OUTPATIENT
Start: 2025-05-13 | End: 2025-05-13 | Stop reason: HOSPADM

## 2025-05-13 RX ORDER — CEFAZOLIN SODIUM 2 G/50ML
2 SOLUTION INTRAVENOUS ONCE
Status: DISCONTINUED | OUTPATIENT
Start: 2025-05-13 | End: 2025-05-13 | Stop reason: HOSPADM

## 2025-05-13 RX ORDER — KETOROLAC TROMETHAMINE 30 MG/ML
INJECTION, SOLUTION INTRAMUSCULAR; INTRAVENOUS AS NEEDED
Status: DISCONTINUED | OUTPATIENT
Start: 2025-05-13 | End: 2025-05-13

## 2025-05-13 RX ORDER — ACETAMINOPHEN 325 MG/1
975 TABLET ORAL ONCE
Status: COMPLETED | OUTPATIENT
Start: 2025-05-13 | End: 2025-05-13

## 2025-05-13 RX ORDER — SODIUM CHLORIDE 0.9 G/100ML
INJECTION, SOLUTION IRRIGATION AS NEEDED
Status: DISCONTINUED | OUTPATIENT
Start: 2025-05-13 | End: 2025-05-13 | Stop reason: HOSPADM

## 2025-05-13 RX ORDER — APREPITANT 40 MG/1
CAPSULE ORAL AS NEEDED
Status: DISCONTINUED | OUTPATIENT
Start: 2025-05-13 | End: 2025-05-13

## 2025-05-13 RX ORDER — DEXMEDETOMIDINE IN 0.9 % NACL 20 MCG/5ML
SYRINGE (ML) INTRAVENOUS AS NEEDED
Status: DISCONTINUED | OUTPATIENT
Start: 2025-05-13 | End: 2025-05-13

## 2025-05-13 RX ORDER — ACETAMINOPHEN 325 MG/1
975 TABLET ORAL EVERY 6 HOURS PRN
Qty: 90 TABLET | Refills: 0 | Status: SHIPPED | OUTPATIENT
Start: 2025-05-13

## 2025-05-13 RX ORDER — DROPERIDOL 2.5 MG/ML
0.62 INJECTION, SOLUTION INTRAMUSCULAR; INTRAVENOUS ONCE AS NEEDED
Status: DISCONTINUED | OUTPATIENT
Start: 2025-05-13 | End: 2025-05-13 | Stop reason: HOSPADM

## 2025-05-13 RX ORDER — CEFAZOLIN 1 G/1
INJECTION, POWDER, FOR SOLUTION INTRAVENOUS AS NEEDED
Status: DISCONTINUED | OUTPATIENT
Start: 2025-05-13 | End: 2025-05-13

## 2025-05-13 RX ADMIN — FENTANYL CITRATE 50 MCG: 50 INJECTION, SOLUTION INTRAMUSCULAR; INTRAVENOUS at 12:38

## 2025-05-13 RX ADMIN — CEFAZOLIN 2 G: 1 INJECTION, POWDER, FOR SOLUTION INTRAMUSCULAR; INTRAVENOUS at 12:41

## 2025-05-13 RX ADMIN — CELECOXIB 400 MG: 200 CAPSULE ORAL at 10:37

## 2025-05-13 RX ADMIN — DEXAMETHASONE SODIUM PHOSPHATE 8 MG: 4 INJECTION, SOLUTION INTRAMUSCULAR; INTRAVENOUS at 12:56

## 2025-05-13 RX ADMIN — SCOLOPAMINE TRANSDERMAL SYSTEM 1 PATCH: 1 PATCH, EXTENDED RELEASE TRANSDERMAL at 12:26

## 2025-05-13 RX ADMIN — FENTANYL CITRATE 50 MCG: 50 INJECTION, SOLUTION INTRAMUSCULAR; INTRAVENOUS at 14:20

## 2025-05-13 RX ADMIN — KETOROLAC TROMETHAMINE 30 MG: 30 INJECTION, SOLUTION INTRAMUSCULAR at 14:01

## 2025-05-13 RX ADMIN — PROPOFOL 200 MG: 10 INJECTION, EMULSION INTRAVENOUS at 12:38

## 2025-05-13 RX ADMIN — CARBOXYMETHYLCELLULOSE SODIUM 2 DROP: 0.5 SOLUTION/ DROPS OPHTHALMIC at 12:41

## 2025-05-13 RX ADMIN — SODIUM CHLORIDE, POTASSIUM CHLORIDE, SODIUM LACTATE AND CALCIUM CHLORIDE: 600; 310; 30; 20 INJECTION, SOLUTION INTRAVENOUS at 12:32

## 2025-05-13 RX ADMIN — LIDOCAINE HYDROCHLORIDE 100 MG: 20 INJECTION, SOLUTION EPIDURAL; INFILTRATION; INTRACAUDAL; PERINEURAL at 12:38

## 2025-05-13 RX ADMIN — METRONIDAZOLE 500 MG: 500 SOLUTION INTRAVENOUS at 12:43

## 2025-05-13 RX ADMIN — ROCURONIUM BROMIDE 20 MG: 10 INJECTION INTRAVENOUS at 13:31

## 2025-05-13 RX ADMIN — SUGAMMADEX 200 MG: 100 INJECTION, SOLUTION INTRAVENOUS at 14:17

## 2025-05-13 RX ADMIN — Medication 8 MCG: at 12:38

## 2025-05-13 RX ADMIN — Medication 100 MCG: at 12:54

## 2025-05-13 RX ADMIN — MIDAZOLAM HYDROCHLORIDE 2 MG: 1 INJECTION, SOLUTION INTRAMUSCULAR; INTRAVENOUS at 12:32

## 2025-05-13 RX ADMIN — APREPITANT 40 MG: 40 CAPSULE ORAL at 12:26

## 2025-05-13 RX ADMIN — PHENAZOPYRIDINE 200 MG: 100 TABLET ORAL at 10:37

## 2025-05-13 RX ADMIN — ONDANSETRON 4 MG: 2 INJECTION, SOLUTION INTRAMUSCULAR; INTRAVENOUS at 14:01

## 2025-05-13 RX ADMIN — ROCURONIUM BROMIDE 60 MG: 10 INJECTION INTRAVENOUS at 12:38

## 2025-05-13 RX ADMIN — ACETAMINOPHEN 975 MG: 325 TABLET ORAL at 10:37

## 2025-05-13 RX ADMIN — GABAPENTIN 600 MG: 300 CAPSULE ORAL at 10:37

## 2025-05-13 ASSESSMENT — PAIN SCALES - GENERAL
PAINLEVEL_OUTOF10: 0 - NO PAIN

## 2025-05-13 ASSESSMENT — PAIN - FUNCTIONAL ASSESSMENT
PAIN_FUNCTIONAL_ASSESSMENT: 0-10

## 2025-05-13 NOTE — DISCHARGE INSTRUCTIONS
Laparoscopic Hysterectomy Discharge Instructions  If you have any questions about your care, please contact the office at [phone]    Medications and Pain Management  Common areas of pain after laparoscopic hysterectomy include the incision pain, pain in between your shoulder blades, the pelvis and lower back. The gas that was used to distend your abdomen for the surgery is absorbed slowly into your blood stream over the first 3-4 days after surgery. It is not passed intestinally, although, because your abdomen is distended, it may feel similar to intestinal gas. Staying active and walking is the best way to promote the absorption of this gas.  Immediately after surgery, nerve pain is the most intense, typically for the first 6 to 12 hours. As the body heals, it creates inflammation around the incisions sites adding pressure and creating soreness. After 5 days, the inflammation begins to recede and significant improvement in soreness is expected. Pulling on the incisions, especially if sudden, such as when you cough, will reactivate the nerve pain. Support your abdomen with a pillow during coughing or sneezing as this will be helpful to minimize pain. There are two types of pain pills typically used for post-operative pain management, narcotics such as tramadol and an anti-inflammatory such as Ibuprofen or Naprosyn.  Taking regular anti-inflammatory pills, such as 600mg of Ibuprofen every 6 hours for the first 5 days and then as needed is recommended. You can alternate ibuprofen with tylenol (975mg or 1000mg). The tylenol can be taken every 6 hours.  If you have problems using NSAIDs, be sure to discuss this with your doctor. The narcotic can be used on a schedule for the first 1 to 2 days but after that, only as you need it. Narcotics can cause constipation, nausea, sleepiness and headaches. You may begin your usual home medications as you were taking before unless directed by your doctor.    Incisional  care  Paper tape steri-strips are typically used for the abdominal incisions. The steri-strips will fall off on their own or can be removed at your first post-operative appointment. You may shower and use a mild soap around the incisions and pat dry. Do not use a washcloth or scrub the incisions. Using peroxide or antiseptics is not recommended for routine care. Avoid hot and steamy showers as this may cause you to feel faint. No tub baths for six weeks following surgery. There may be discoloration or bruising around the incisions. This is normal and may take several weeks to resolve. Firmness or a nodular area under the skin near the incision may represent a collection of blood, this too will resolve on its own after a little time. If any incision develops tenderness, redness in the skin layer or has drainage please call the office.    Vaginal Discharge  You may have a mildly malodorous discharge and occasional spotting for up to 6 weeks. Do not put anything in the vagina like tampons or have sexual intercourse for 6 weeks after surgery. If you are having bleeding like a period, that is abnormal and you should contact your doctor.    GI Function, Nausea and Constipation  Nausea can occasionally be an issue in the first few days after surgery. It is usually caused as a side effect from the anesthesia and pain medicine, particularly narcotics. Taking the pain pills with food is a food way to proactively minimize this. Throwing up, especially after the first day, is not expected and if this happens, you should call your doctor. Feeling gassy and constipation can be a problem for the first week after surgery. Limiting the use of narcotics may be helpful. Stool softeners twice a day and a high fiber diet are safe. If needed, Miralax once daily is a good choice. If no bowel movement after 3 days, you will need to increase the Miralax until soft, regular bowel movements are passing.    Urinating  Because the bladder is  disturbed by the surgery, the normal sensation may be temporarily altered. You may not be aware that your bladder is full. If the bladder is allowed to get over distended, it may make the problem worse. This is why we make sure that you are able to empty your bladder adequately before you go home. For the first few days at home, you should make a point to empty your bladder every 3 to 4 hours. Pain with voiding, especially after the first day, is not expected and may represent a bladder infection.    Activity  For the first two days post-operatively, your soreness and recovery from anesthesia will limit your activity  Stairs are safe, just take your time  At a minimum during this time, you should walk around for 10-15 minutes every 2-3 hours. After that, in the first week, any activity except for overt exercise is safe.   During the first week you should not commit to being on your feet for more than 30 minutes at a time.   During the second week, light exercise is encouraged.  After 2 weeks from surgery, you should try to get back into regular activity other than heavy lifting.   For healing, please limit the amount of weight lifted to 8-10 pounds (a gallon of milk) for the first 6 weeks after surgery.   Driving is usually okay after the first few days. You must be able to comfortably wear a seatbelt, press the gas/brake pedals, and drive defensively. You may not drive while taking narcotic pain medicines.    When to Call the Doctor  Call for any fever above 100.4 F (If you do not feel feverish you do not have to routinely check your temperature.)  Call for severe pain not improved by medications  Call for persistent nausea, vomiting  Call for vaginal bleeding that is heavy as a period or passing blood clots larger than a quarter  Call for unusual swelling in your legs  Call if the incisions develop painful redness and discharge    In an emergency, call 911 or go to an Emergency Department at a nearby hospital

## 2025-05-13 NOTE — OP NOTE
Laparoscopic Assisted Total Hysterectomy; Bilateral Salpingectomy (B) Operative Note     Date: 2025  OR Location: [facility] A OR    Name: Jen Franks, : 1985, Age: 39 y.o., MRN: [id number], Sex: female    Diagnosis  Pre-op Diagnosis      * Abnormal uterine bleeding (AUB) [N93.9] Post-op Diagnosis     * Abnormal uterine bleeding (AUB) [N93.9]     Procedures  Laparoscopic Assisted Total Hysterectomy; Bilateral Salpingectomy  76073 - IA LAPAROSCOPY W TOTAL HYSTERECTOMY UTERUS 250 GM/<      Surgeons      * Penelope Scherer - Primary    Resident/Fellow/Other Assistant:  Surgeons and Role:     * Opal Villafuerte MD - Resident - Assisting    Staff:   Circulator: Ophelia Le Person: Ary    Anesthesia Staff: Anesthesiologist: Robi Norris MD  C-AA: SERGEI Nam  EMILIO: Shannon Cantrell    Procedure Summary  Anesthesia: General  ASA: II  Estimated Blood Loss: 20 mL  Intra-op Medications:   Administrations occurring from 1155 to 1425 on 25:   Medication Name Total Dose   BUPivacaine HCl (Marcaine) 0.5 % (5 mg/mL) injection 30 mL   sodium chloride 0.9 % irrigation solution 1,000 mL   aprepitant (Emend) 40 mg capsule 40 mg   ceFAZolin (Ancef) vial 1 g 2 g   dexAMETHasone (Decadron) 4 mg/mL IV Syringe 2 mL 8 mg   dexmedeTOMidine 4 mcg/mL in NS 5 mL syringe 8 mcg   fentaNYL (Sublimaze) injection 50 mcg/mL 50 mcg   ketorolac (Toradol) injection 30 mg 30 mg   LR bolus Cannot be calculated   lidocaine PF (Xylocaine-MPF) local injection 2 % 100 mg   lubricating eye drops ophthalmic solution 2 drop   midazolam PF (Versed) injection 1 mg/mL 2 mg   ondansetron (Zofran) 2 mg/mL injection 4 mg   phenylephrine 100 mcg/mL syringe 10 mL (prefilled) 100 mcg   propofol (Diprivan) injection 10 mg/mL 200 mg   rocuronium (ZeMuron) 50 mg/5 mL injection 80 mg   scopolamine patch 1 patch   metroNIDAZOLE (Flagyl) 500 mg in sodium chloride (iso)  mL 500 mg              Anesthesia Record               Intraprocedure I/O Totals           Output    Est. Blood Loss 20 mL    Total Output 20 mL          Specimen:   ID Type Source Tests Collected by Time   1 : UTERUS CERVIX BILATERAL FALLOPIAN TUBES Tissue UTERUS, CERVIX, AND BILATERAL FALLOPIAN TUBES SURGICAL PATHOLOGY EXAM Penelope Scherer MD 5/13/2025 1324                 Drains and/or Catheters:   [REMOVED] Urethral Catheter Non-latex 16 Fr. (Removed)       Indications: Jen Franks is an 39 y.o. female who is having surgery for Abnormal uterine bleeding (AUB) [N93.9].     Findings  Grossly normal pelvic and upper abdominal anatomy. Uterine weight < 250 grams.    Description of Procedure  Patient was taken to the operating room where she was prepared and draped in the usual sterile fashion.  A Capt'nSocial uterine manipulator was placed.  A Barber catheter was placed. Attention was then turned abdominally.  The base of the umbilicus was elevated using Kocher clamps.  The skin was incised with a scalpel.  The fascia was grasped with Kochers and entered sharply using a scalpel.  Peritoneum was bluntly opened using a Barbara clamp.  Intraperitoneal placement was confirmed via visualization of underlying bowel.     Tawanda trocar was then placed at the umbilical entry site. Diagnostic laparoscopy was performed, and revealed findings as noted above.  No areas of trauma or injury were noted immediately inferior to the umbilicus. Complete abdominal survey was performed. The patient was then placed in steep Trendelenburg positioning.     Ancillary trocars were then placed under direct visualization. Three 5mm trocars were placed, one in the LLQ, one in the RLQ, and one trocar was placed suprapubically.     Attention was turned to the pelvis. The right fallopian tube was elevated away from the underlying structures.  The mesosalpinx was clamped, sealed, and transected using the LigaSure device.  The fallopian tube was  to the level of the uterine cornua.  The round ligament and utero-ovarian ligament  were then clamped sealed and transected using the LigaSure device.  The broad ligament was then opened anteriorly and posteriorly to skeletonize the uterine vasculature.  The bladder flap was started using the LigaSure device.  The uterine vasculature was then clamped sealed and transected using the LigaSure device.  It was lateralized using the LigaSure device.      Attention was then turned to the left side of the pelvis, which was dissected in a similar fashion.     Monopolar electrosurgery was then utilized to complete the bladder flap.  The bladder was dissected away from the lower uterine segment and cervix using blunt dissection and monopolar electrosurgery.  At this time care was taken to ensure that the bladder was well out of the operative field as well as the bilateral uterine pedicles. Colpotomy was then performed using monopolar electrosurgery.  The uterus was fully  from the vagina. The uterus, cervix, and bilateral Fallopian tubes were then removed through the vagina.     The pelvis was inspected and noted be adequately hemostatic.  The vaginal cuff was then reapproximated laparoscopically using 0 V lock suture. Hemostasis was achieved using monopolar electrosurgery.       The umbilical fascia was then closed with 0 Vicryl suture.  The skin was closed with 4-0 Monocryl.        The Barber catheter was removed. Cystoscopy was performed and revealed intact bladder with bilateral ureteral jets and no evidence of trauma or foreign material.      The patient was awakened from general anesthesia and taken the recovery room in stable condition.      I  was present and scrubbed for the entirety of the surgical procedure.      Evidence of Infection:   Complications:  None; patient tolerated the procedure well.    Disposition: PACU - hemodynamically stable.  Condition: stable         Penelope Scherer  Phone Number: [phone]

## 2025-05-13 NOTE — ANESTHESIA POSTPROCEDURE EVALUATION
Patient: Jen Franks    Procedure Summary       Date: 05/13/25 Room / Location: [facility] OR 06 / Virtual [facility] OR    Anesthesia Start: 1232 Anesthesia Stop: 1429    Procedure: Laparoscopic Assisted Total Hysterectomy; Bilateral Salpingectomy (Bilateral: Abdomen) Diagnosis:       Abnormal uterine bleeding (AUB)      (Abnormal uterine bleeding (AUB) [N93.9])    Surgeons: Penelope Scherer MD Responsible Provider: Robi Norris MD    Anesthesia Type: general ASA Status: 2            Anesthesia Type: general    Vitals Value Taken Time   /68 05/13/25 15:15   Temp 36.2 °C (97.2 °F) 05/13/25 14:27   Pulse 61 05/13/25 15:15   Resp 18 05/13/25 15:15   SpO2 99 % 05/13/25 15:15       Anesthesia Post Evaluation    Patient participation: complete - patient participated  Level of consciousness: awake  Pain management: satisfactory to patient  Airway patency: patent  Cardiovascular status: acceptable and hemodynamically stable  Respiratory status: acceptable and nonlabored ventilation  Hydration status: balanced  Postoperative Nausea and Vomiting: none        No notable events documented.

## 2025-05-13 NOTE — H&P
"Pre-op H&P   Jen Franks is a  38 yo G0 presenting for Total laparoscopic hysterectomy, bilateral salpingectomy, possible cystoscopy, any indicated procedure for AUB likely A vs O and dysmenorrhea      Imaging:   - Pelvic US 11/2024 showed uterus measuring 7cm x 3cm x 4cm. Otherwise unremarkable.   Labs:   - BMP 7/2024 WNL  - CBC 11/2024 WNL   - TSH 2023 WNL   - Glucose 7/2024 WNL   Screening:   - Last pap 2021 negative/negative, LEEP in her 20's   - EMC 11/2024 benign   PMHx: ADHD, bipolar depression, GERD, MARIELY (does not need CPAP), PONV, acne  PSHx: D+C/hysteroscopy, knee surgery, shoulder surgery x 2     Past Medical History  Medical History[1]    Surgical History  Surgical History[2]     Social History  She reports that she quit smoking about 8 years ago. Her smoking use included cigarettes. She started smoking about 22 years ago. She has a 14 pack-year smoking history. She has never been exposed to tobacco smoke. She has never used smokeless tobacco. She reports that she does not currently use alcohol. She reports that she does not use drugs.    Family History  Family History[3]     Allergies  Egg and Meperidine    Last Recorded Vitals  Blood pressure 110/67, pulse 82, temperature 36 °C (96.8 °F), temperature source Temporal, resp. rate 18, height 1.6 m (5' 3\"), weight 71.2 kg (156 lb 15.5 oz), last menstrual period 04/27/2025, SpO2 97%.    Gen: NAD  HEENT: NCAT  Resp: normal work of breathing on room air  Card: regular rate  Neuro: grossly intact   Psych: appropriate affect   Motor: moving all extremities spontaneously   Abdomen: Non distended      Assessment & Plan  Abnormal uterine bleeding (AUB)      38 yo presenting for Total laparoscopic hysterectomy, bilateral salpingectomy, possible cystoscopy, any indicated procedure    Plan  - proceed with surgery as scheduled     Patient seen and dw Dr Niesha Villafuerte MD         [1]   Past Medical History:  Diagnosis Date    Abnormal uterine bleeding (AUB)  "    Plan: Laparoscopic Assisted Total Hysterectomy; Bilateral Salpingectomy 5/13/25    Acne     on spironolactone    ADHD (attention deficit hyperactivity disorder)     Adverse effect of anesthesia     Postop agitation    Anxiety     on propranolol    Bipolar affect, depressed (Multi)     GERD (gastroesophageal reflux disease)     under control    PONV (postoperative nausea and vomiting)     Sleep apnea     no CPAP    Thyroid nodule     under observation   [2]   Past Surgical History:  Procedure Laterality Date    CERVICAL BIOPSY  W/ LOOP ELECTRODE EXCISION      COLPOSCOPY      HYSTEROSCOPY  11/19/2024    Hysteroscopy with Endometrial Curettage    INTRAUTERINE DEVICE INSERTION      removal    KNEE Bilateral     Arthroscopy    SHOULDER ARTHROSCOPY Right 08/08/2024    Diagnostic arthroscopy RIGHT Shoulder with debridement and possible biceps tenodesis    SHOULDER SURGERY Right 01/11/2024    RIGHT SHOULDER SLAP REPAIR    WISDOM TOOTH EXTRACTION     [3]   Family History  Problem Relation Name Age of Onset    Hypertension Brother      Colon cancer Maternal Grandfather      Alcohol abuse Mother Talia Chavez     Arthritis Mother Talia Chavez     Depression Mother Talia Chavez     Hearing loss Mother Talia Chavez     Alcohol abuse Father Tuan Franks     Hypertension Brother Tuan Franks

## 2025-05-13 NOTE — ANESTHESIA PROCEDURE NOTES
Airway  Date/Time: 5/13/2025 12:44 PM  Reason: elective    Airway not difficult    Staffing  Performed: EMILIO   Authorized by: Robi Norris MD    Performed by: SERGEI Nam  Patient location during procedure: OR    Patient Condition  Indications for airway management: anesthesia  Patient position: sniffing  No planned trial extubation  Sedation level: deep     Final Airway Details   Preoxygenated: yes  Final airway type: endotracheal airway  Successful airway: ETT  Cuffed: yes   Successful intubation technique: direct laryngoscopy  Adjuncts used in placement: intubating stylet  Endotracheal tube insertion site: oral  Blade: Xavi  Blade size: #3  ETT size (mm): 7.0  Cormack-Lehane Classification: grade I - full view of glottis  Placement verified by: chest auscultation and capnometry   Cuff volume (mL): 7  Measured from: lips  ETT to lips (cm): 22  Number of attempts at approach: 1

## 2025-05-21 NOTE — PROGRESS NOTES
"Division of Minimally Invasive Gynecologic Surgery  [facility]    Date: 5/27/25 - Gynecology Visit    Jen Franks is a 39 y.o. status post TLH-BS on 5/13/25 presents for post op check.     Overall recovering well, meeting all milestones.     PMHx, PSHx, SHx, Allergies, and Medications updated in Epic.    ROS: reviewed and negative    PE:/76   Pulse 87   Ht 1.6 m (5' 3\")   Wt 71.4 kg (157 lb 6.4 oz)   LMP 04/27/2025 Comment: hcg negative  BMI 27.88 kg/m²    Constitutional:  No acute distress  HEENT: EOM grossly intact, MMM, neck supple and with full ROM  Pulm:  Effort normal. No accessory muscle usage.  No respiratory distress.  Neurological:  AAO x 3, appropriate to interview  Skin: Warm, no pallor.  Psychiatric:  normal mood and affect.    Assessment/Plan:     Jen Franks is a 39 y.o. status post TLH-BS on 5/13/25 presents for post op check.   - Recovering well, no concerns  - Path reviewed pending   - Continue post op restrictions until 6 weeks after surgery, reviewed these today      Penelope Scherer MD  Division of Minimally Invasive Gynecologic Surgery  [facility]    "

## 2025-05-27 ENCOUNTER — OFFICE VISIT (OUTPATIENT)
Dept: OBSTETRICS AND GYNECOLOGY | Facility: CLINIC | Age: 40
End: 2025-05-27
Payer: COMMERCIAL

## 2025-05-27 VITALS
HEART RATE: 87 BPM | DIASTOLIC BLOOD PRESSURE: 76 MMHG | SYSTOLIC BLOOD PRESSURE: 106 MMHG | WEIGHT: 157.4 LBS | BODY MASS INDEX: 27.89 KG/M2 | HEIGHT: 63 IN

## 2025-05-27 DIAGNOSIS — Z48.89 POSTOPERATIVE VISIT: Primary | ICD-10-CM

## 2025-05-27 PROCEDURE — 99211 OFF/OP EST MAY X REQ PHY/QHP: CPT | Performed by: STUDENT IN AN ORGANIZED HEALTH CARE EDUCATION/TRAINING PROGRAM

## 2025-05-27 PROCEDURE — 3008F BODY MASS INDEX DOCD: CPT | Performed by: STUDENT IN AN ORGANIZED HEALTH CARE EDUCATION/TRAINING PROGRAM

## 2025-05-27 ASSESSMENT — ENCOUNTER SYMPTOMS
ENDOCRINE NEGATIVE: 0
CONSTITUTIONAL NEGATIVE: 0
GASTROINTESTINAL NEGATIVE: 0
HEMATOLOGIC/LYMPHATIC NEGATIVE: 0
RESPIRATORY NEGATIVE: 0
MUSCULOSKELETAL NEGATIVE: 0
EYES NEGATIVE: 0
ALLERGIC/IMMUNOLOGIC NEGATIVE: 0
CARDIOVASCULAR NEGATIVE: 0
PSYCHIATRIC NEGATIVE: 0
NEUROLOGICAL NEGATIVE: 0

## 2025-05-27 ASSESSMENT — PAIN SCALES - GENERAL: PAINLEVEL_OUTOF10: 0-NO PAIN

## 2025-06-26 ENCOUNTER — APPOINTMENT (OUTPATIENT)
Dept: OBSTETRICS AND GYNECOLOGY | Facility: CLINIC | Age: 40
End: 2025-06-26
Payer: COMMERCIAL

## 2025-06-26 ENCOUNTER — OFFICE VISIT (OUTPATIENT)
Dept: OBSTETRICS AND GYNECOLOGY | Facility: CLINIC | Age: 40
End: 2025-06-26
Payer: COMMERCIAL

## 2025-06-26 VITALS
HEART RATE: 60 BPM | BODY MASS INDEX: 27.11 KG/M2 | WEIGHT: 153 LBS | DIASTOLIC BLOOD PRESSURE: 80 MMHG | HEIGHT: 63 IN | SYSTOLIC BLOOD PRESSURE: 111 MMHG

## 2025-06-26 DIAGNOSIS — Z09 POSTOP CHECK: Primary | ICD-10-CM

## 2025-06-26 PROCEDURE — 99211 OFF/OP EST MAY X REQ PHY/QHP: CPT

## 2025-06-26 PROCEDURE — 1036F TOBACCO NON-USER: CPT

## 2025-06-26 PROCEDURE — 3008F BODY MASS INDEX DOCD: CPT

## 2025-06-26 ASSESSMENT — ENCOUNTER SYMPTOMS
ENDOCRINE NEGATIVE: 0
GASTROINTESTINAL NEGATIVE: 0
ALLERGIC/IMMUNOLOGIC NEGATIVE: 0
NEUROLOGICAL NEGATIVE: 0
PSYCHIATRIC NEGATIVE: 0
MUSCULOSKELETAL NEGATIVE: 0
CONSTITUTIONAL NEGATIVE: 0
CARDIOVASCULAR NEGATIVE: 0
RESPIRATORY NEGATIVE: 0
HEMATOLOGIC/LYMPHATIC NEGATIVE: 0
EYES NEGATIVE: 0

## 2025-06-26 ASSESSMENT — PAIN SCALES - GENERAL: PAINLEVEL_OUTOF10: 0-NO PAIN

## 2025-06-26 NOTE — PROGRESS NOTES
Division of Minimally Invasive Gynecologic Surgery  [facility]    Date: 06/26/2025 - Gynecology Visit    Jen Franks is a 40 y.o. status post TLH-BS with Dr. Scherer on 05/13/2025, IntraOp findings significant for Grossly normal pelvic and upper abdominal anatomy. Uterine weight < 250 grams.     Overall recovering well, meeting all milestones.  Patient denies abdominal/pelvic pain.  Has very minimal amount of dark brown spotting intermittently.  Denies any issues with bowel or bladder.    PMHx, PSHx, SHx, Allergies, and Medications updated in Epic.    ROS: reviewed and negative    PE:  Constitutional:  No acute distress  HEENT: EOM grossly intact, MMM, neck supple and with full ROM  Pulm:  Effort normal. No accessory muscle usage.  No respiratory distress.  Abd: soft, non-distended, non-tender, no palpable masses, incisions c/d/i  :  - EGBUS: grossly WNL  - Speculum: vaginal mucosa grossly WNL, no trauma or lesions, cuff intact w/o laxity   Neurological:  AAO x 3, appropriate to interview  Skin: Warm, no pallor.  Psychiatric:  normal mood and affect.    Assessment/Plan:     Jen Franks is a 40 y.o. status post TLH-BS with Dr. Scherer on 05/13/2025.   - Recovering well, no concerns  - Path reviewed, benign  - No indication for further pap smears  - She is now 6 weeks postop and restrictions were lifted today.  Patient encouraged to gradually increase her activity.      YU Key-CNP  Division of Minimally Invasive Gynecologic Surgery  [facility]

## (undated) DEVICE — RETRACTOR, CERVICAL CUP, VCARE, STANDARD

## (undated) DEVICE — TROCAR SYSTEM, BALLOON, KII GELPORT, 12 X 100MM

## (undated) DEVICE — Device

## (undated) DEVICE — DRESSING, GAUZE, FLUFF, 1 PLY, 18 X 36 IN

## (undated) DEVICE — BASIN SET, D & C

## (undated) DEVICE — TUBING, SUCTION, 6MM X 10, CLEAN N-COND

## (undated) DEVICE — DRAPE, PAD, PREP, W/ 9 IN CUFF, 24 X 41, LF, NS

## (undated) DEVICE — GLOVE, SURGICAL, PROTEXIS PI , 8.0, PF, LF

## (undated) DEVICE — MASK, FACE, TENET, FOAM POSITIONING, DISPOSABLE

## (undated) DEVICE — DRESSING, ABDOMINAL, TENDERSORB, 8 X 7-1/2 IN, STERILE

## (undated) DEVICE — ACCESSORY, AVETA, WASTE MANAGEMENT WITH CAP

## (undated) DEVICE — TUBE SET, PNEUMOCLEAR, SMOKE EVACU, HIGH-FLOW

## (undated) DEVICE — PROBE, APOLLO RF, 90 DEG, EXTRA LARTGE

## (undated) DEVICE — KIT, STABILIZATION SHOULDER

## (undated) DEVICE — SUTURE, MONOCRYL, 4-0, 18 IN, PS2, UNDYED

## (undated) DEVICE — DRESSING, TRANSPARENT, TEGADERM, FRAME STYLE, 4 X 4.5, STRL

## (undated) DEVICE — BLANKET, LOWER BODY, VHA PLUS, ADULT

## (undated) DEVICE — PAD, GROUNDING, ELECTROSURGICAL, W/9 FT CABLE, POLYHESIVE II, ADULT, LF

## (undated) DEVICE — SHEAR, W/UNIPOLAR CAUTERY, ENDOSHEAR, 5 MM

## (undated) DEVICE — MANIFOLD, 4 PORT NEPTUNE STANDARD

## (undated) DEVICE — SUTURE, ETHILON, 3-0, 18 IN, PS1, BLACK

## (undated) DEVICE — TRAY, FOLEY, LUBRI-SIL, 16FR, COMPLETE CARE W/STATLOCK

## (undated) DEVICE — GLOVE, SURGICAL, PROTEXIS PI ORTHO, 6.5, PF, LF

## (undated) DEVICE — GLOVE, SURGICAL, PROTEXIS,  6.0, PF, LATEX

## (undated) DEVICE — COVER, LIGHT HANDLE, SURGICAL, FLEXIBLE, DISPOSABLE, STERILE

## (undated) DEVICE — CORD, MONOPOLAR, HIGH FREQUENCY, W/8MM PLUG F/VALLEYLAB, 8FT/244CM, STRL

## (undated) DEVICE — PREP, SKIN, BETADINE, SCRUB, 32 OZ

## (undated) DEVICE — DRESSING, ADHESIVE, ISLAND, TELFA, 2 X 3.75 IN, LF

## (undated) DEVICE — REST, HEAD, BAGEL, 9 IN

## (undated) DEVICE — PAD, SANITARY, OBSTETRICAL, W/ADHSV STRIP,11 IN,LF

## (undated) DEVICE — CANNULA, KII ADVANCED FIXATION, 5X100MM W/SEAL

## (undated) DEVICE — TROCAR, OPTICAL BLADELESS 5MM X 100 W/ADVANCED FIXATION

## (undated) DEVICE — PUMP, STRYKERFLOW 2 & HANDPIECE W/10FT. IRRIGATION TUBING

## (undated) DEVICE — SOLUTION, IRRIGATION, USP, SODIUM CHLORIDE 0.9%, 3000 ML, BAG

## (undated) DEVICE — DRESSING, GAUZE, SPONGE, KERLIX, SUPER, 6 X 6.75 IN, STERILE 10PK

## (undated) DEVICE — HYSTEROSCOPE, AVETA CORAL, 4.6MM

## (undated) DEVICE — PREP, SKIN, BETADINE, SOLUTION, 16 OZ

## (undated) DEVICE — SUTURE, VICRYL, 3-0, 27 IN, SH, VIOLET

## (undated) DEVICE — IRRIGATION SET, CYSTOSCOPY, REGULATING CLAMP, STRAIGHT, 81 IN

## (undated) DEVICE — TUBING, ARTHROSCOPIC INFLOW, 10K

## (undated) DEVICE — GLOVE, SURGICAL, PROTEXIS PI ORTHO, 8.0, PF, LF

## (undated) DEVICE — GLOVE, SURGICAL, PROTEXIS PI MICRO, 6.0, PF, LF

## (undated) DEVICE — DRESSING, GAUZE, PETROLATUM, PATCH, XEROFORM, 1 X 8 IN, STERILE

## (undated) DEVICE — ACCESSORY, FLUID MANAGEMENT, AVETA

## (undated) DEVICE — COVER, CART, 45 X 27 X 48 IN, CLEAR

## (undated) DEVICE — LIGASURE, V SEALER/DIVIDER  5MM BLUNT TIP

## (undated) DEVICE — SYRINGE, 50 CC, LUER LOCK

## (undated) DEVICE — PREP TRAY, BASIC

## (undated) DEVICE — PREP TRAY, SKIN, DRY, W/GLOVES

## (undated) DEVICE — POSITIONING KIT, PAGAZZI, PINK PAD XL, W/ ARM AND HEAD REST

## (undated) DEVICE — GLOVE, SURGICAL, PROTEXIS PI BLUE W/NEUTHERA, 8.0, PF, LF

## (undated) DEVICE — SPONGE, LAP, XRAY DECT, 18IN X 18IN, W/MASTER DMT, STERILE

## (undated) DEVICE — GOWN, ASTOUND, L

## (undated) DEVICE — SLEEVE, SURGICAL, 21.5 X 5.5 IN, LF, STERILE

## (undated) DEVICE — OCCLUDER, COLPO-PNEUMO

## (undated) DEVICE — DRESSING, GAUZE, SPONGE, VERSALON, 4 PLY, 2 X 2 IN, STERILE

## (undated) DEVICE — CLEAN KIT, ANTIFOG SCOPE, SEE SHARP 150MM

## (undated) DEVICE — GLOVE, SURGICAL, PROTEXIS PI , 7.5, PF, LF

## (undated) DEVICE — SPONGE GAUZE, XRAY SC+RFID, 4X4 16 PLY, STERILE

## (undated) DEVICE — 22CM X 45CM, LARGE DELUXE ARM SLING W/PAD